# Patient Record
Sex: MALE | Race: WHITE | NOT HISPANIC OR LATINO | Employment: UNEMPLOYED | URBAN - METROPOLITAN AREA
[De-identification: names, ages, dates, MRNs, and addresses within clinical notes are randomized per-mention and may not be internally consistent; named-entity substitution may affect disease eponyms.]

---

## 2017-10-09 ENCOUNTER — OCCUPATIONAL MEDICINE (OUTPATIENT)
Dept: URGENT CARE | Facility: PHYSICIAN GROUP | Age: 47
End: 2017-10-09
Payer: COMMERCIAL

## 2017-10-09 VITALS
SYSTOLIC BLOOD PRESSURE: 120 MMHG | TEMPERATURE: 99 F | HEART RATE: 83 BPM | HEIGHT: 70 IN | OXYGEN SATURATION: 92 % | DIASTOLIC BLOOD PRESSURE: 80 MMHG | WEIGHT: 254 LBS | BODY MASS INDEX: 36.36 KG/M2 | RESPIRATION RATE: 16 BRPM

## 2017-10-09 DIAGNOSIS — M77.11 LATERAL EPICONDYLITIS, RIGHT ELBOW: ICD-10-CM

## 2017-10-09 PROCEDURE — 99204 OFFICE O/P NEW MOD 45 MIN: CPT | Performed by: NURSE PRACTITIONER

## 2017-10-09 ASSESSMENT — ENCOUNTER SYMPTOMS
WEAKNESS: 0
MYALGIAS: 1
TINGLING: 0
BRUISES/BLEEDS EASILY: 0
CHILLS: 0
FEVER: 0
SENSORY CHANGE: 0
FALLS: 0

## 2017-10-09 ASSESSMENT — PAIN SCALES - GENERAL: PAINLEVEL: 5=MODERATE PAIN

## 2017-10-09 NOTE — PROGRESS NOTES
"Subjective:      Rangel Malik is a 46 y.o. male who presents with Work-Related Injury (C/o RT elbow injury DOI 06/15/2017.  No noticable event to account for the pain.  PT states he is unsure if the pain is caused from work or from home.  Pain is exacerbated due to repeatitive motions at work. Unable to extend arm fully, with constant pain.)      DOI 6/15/17. 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 States no known injury. Right hand dominant. States outside portion of right elbow begins to hurt with repetitive motion. States wears elbow ace wrap while at work which helps. States has right hand weakness with lifting cans, able to lift one versus two at this time. No previous right elbow injury. No secondary job.     HPI  See above  PMH:  has a past medical history of Low back pain (9/26/2011).  MEDS:   Current Outpatient Prescriptions:   •  Acetaminophen (TYLENOL PO), Take  by mouth., Disp: , Rfl:   ALLERGIES: No Known Allergies  SURGHX:   Past Surgical History:   Procedure Laterality Date   • VASECTOMY       SOCHX:  reports that he has never smoked. He has never used smokeless tobacco. He reports that he drinks alcohol. He reports that he does not use drugs.  FH: Family history was reviewed, no pertinent findings to report    Review of Systems   Constitutional: Negative for chills, fever and malaise/fatigue.   Musculoskeletal: Positive for joint pain and myalgias. Negative for falls.   Neurological: Negative for tingling, sensory change and weakness.   Endo/Heme/Allergies: Does not bruise/bleed easily.          Objective:     /80   Pulse 83   Temp 37.2 °C (99 °F)   Resp 16   Ht 1.765 m (5' 9.5\")   Wt 115.2 kg (254 lb)   SpO2 92%   BMI 36.97 kg/m²      Physical Exam   Constitutional: He is oriented to person, place, and time. He appears well-developed and well-nourished. No distress.   HENT:   Head: Normocephalic.   Eyes: Conjunctivae and EOM are normal. Pupils are equal, " round, and reactive to light.   Neck: Normal range of motion. Neck supple.   Cardiovascular: Normal rate and regular rhythm.    Pulmonary/Chest: Effort normal and breath sounds normal.   Musculoskeletal: He exhibits tenderness. He exhibits no edema or deformity.        Right elbow: He exhibits normal range of motion, no swelling, no effusion and no deformity. Tenderness found. Lateral epicondyle tenderness noted.   Neurological: He is alert and oriented to person, place, and time.   Skin: Skin is warm and dry. He is not diaphoretic. No erythema.   Vitals reviewed.      A/O x 4. No redness, swelling or deformity seen at right elbow. TTP at lateral aspect of epicondyle. FROM of right elbow joint. Equal  of both hands. Pain at lateral epicondyle with flexion of right elbow. Skin p/w/d, skin sensation intact.       Assessment/Plan:     1. Lateral epicondylitis, right elbow    Mat use Ibuprofen as needed for pain  May use cool compresses for swelling prn  May utilize RICE method prn  Avoid weight lifting until muscle soreness in right arm decreases  May apply topical analgesics prn  Perform proper body mechanics with lifting, twisting, bending and reaching. Ask for assistance with heavy objects  Monitor for deformity, numbness/tingling in fingers, decreased ROM- need re-evaluation  Recheck on 10/13/17

## 2017-10-09 NOTE — LETTER
[facility]  [address] [place] [address]. 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    Medical Information   Related to Industrial Injury? No    Subjective Complaints:  DOI 6/15/17. 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 States no known injury. Right hand dominant. States outside portion of right elbow begins to hurt with repetitive motion. States wears elbow ace wrap while at work which helps. States has right hand weakness with lifting cans, able to lift one versus two at this time. No previous right elbow injury. No secondary job.   Objective Findings: A/O x 4. No redness, swelling or deformity seen at right elbow. TTP at lateral aspect of epicondyle. FROM of right elbow joint. Equal  of both hands. Pain at lateral epicondyle with flexion of right elbow. Skin p/w/d, skin sensation intact.   Pre-Existing Condition(s):     Assessment:   Initial Visit    Status: Additional Care Required  Permanent Disability:No    Plan:   Comments:Ibuprofen as needed for pain    Diagnostics:      Comments:       Disability Information   Status: Released to Restricted Duty    From:  10/9/2017  Through: 10/13/2017 Restrictions are: Temporary   Physical Restrictions   Sitting:    Standing:    Stooping:    Bending:      Squatting:    Walking:    Climbing:    Pushing:  < or = to 4 hrs/day  Comments:right arm   Pulling:  < or = to 4 hrs/day  Comments:right arm Other:    Reaching Above Shoulder (L):   Reaching Above  Shoulder (R):       Reaching Below Shoulder (L):    Reaching Below Shoulder (R):      Not to exceed Weight Limits   Carrying(hrs):   Weight Limit(lb): < or = to 10 pounds Lifting(hrs):   Weight  Limit(lb): < or = to 10 pounds   Comments: May use Ibuprofen up to 600 mg every 4-6 hrs as needed for pain, may apply cool compresses to left elbow for swelling as needed, may apply topical analgesic as needed for pain, may continue to use elbow ace wrap for support and stabilization while at work, apply RICE method as needed (Rest Ice Compression Elevation). Recheck on 10/13/17.    Repetitive Actions   Hands: i.e. Fine Manipulations from Grasping:     Feet: i.e. Operating Foot Controls:     Driving / Operate Machinery:     Physician Name: GEORGE Olivia Physician Signature:   e-Signature:  , Medical Director   Clinic Name / Location: [address]  [place] [address] Clinic Phone Number: Dept: [phone]   Appointment Time: 9:10 Am Visit Start Time: 9:23 AM   Check-In Time:  9:17 Am Visit Discharge Time:  10:06 AM   Original-Treating Physician or Chiropractor    Page 2-Insurer/TPA    Page 3-Employer    Page 4-Employee

## 2017-10-09 NOTE — LETTER
"EMPLOYEE’S CLAIM FOR COMPENSATION/ REPORT OF INITIAL TREATMENT  FORM C-4    EMPLOYEE’S CLAIM - PROVIDE ALL INFORMATION REQUESTED   First Name  Rangel Last Name  Helena Birthdate                    1970                Sex  male Claim Number   Home Address  [address] Age  46 y.o. 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 (if applicable)  Working    How did this injury or occupational disease occur? (Be specific an answer in detail. Use additional sheet if necessary)  Repetitive motion cutting open boxes   If you believe that you have an occupational disease, when did you first have knowledge of the disability and it relationship to your employment?  n/a Witnesses to the Accident        Nature of Injury or Occupational Disease  Strain  Part(s) of Body Injured or Affected  Elbow (R), N/A, N/A    I certify that the above is true and correct to the best of my knowledge and that I have provided this information in order to obtain the benefits of [place]  I hereby " authorize any physician, chiropractor, surgeon, practitioner, or other person, any hospital, including [facility], any medical service organization, any insurance company, or other institution or organization to release to each other, any medical or other information, including benefits paid or payable, pertinent to this injury or disease, except information relative to diagnosis, treatment and/or counseling for AIDS, psychological conditions, alcohol or controlled substances, for which I must give specific authorization.  A Photostat of this authorization shall be as valid as the original.     Date   Place   Employee’s Signature   THIS REPORT MUST BE COMPLETED AND MAILED WITHIN 3 WORKING DAYS OF TREATMENT   Place  [facility]  Name of Facility  [facility]   Date  10/9/2017 Diagnosis  (M77.11) Lateral epicondylitis, right elbow Is there evidence the injured employee was under the influence of alcohol and/or another controlled substance at the time of accident?   Hour  9:23 AM Description of Injury or Disease  The encounter diagnosis was Lateral epicondylitis, right elbow. No   Treatment  May use Ibuprofen up to 600 mg every 4-6 hrs as needed for pain, may apply cool compresses to left elbow for swelling as needed, may apply topical analgesic as needed for pain, may continue to use elbow ace wrap for support and stabilization while at work, apply RICE method as needed (Rest Ice Compression Elevation).   Have you advised the patient to remain off work five days or more? No   X-Ray Findings      If Yes   From Date  To Date      From information given by the employee, together with medical evidence, can you directly connect this injury or occupational disease as job incurred?  No If No Full Duty  No Modified Duty  Yes   Is additional medical care by a physician indicated?  Yes If Modified Duty, Specify any Limitations / Restrictions  No push/pull motion with right  "arm > 4 hrs/shift, no lifting/carrying > 10#   Do you know of any previous injury or disease contributing to this condition or occupational disease?                            No   Date  10/9/2017 Print Doctor’s Name GEORGE Olivia certify the employer’s copy of  this form was mailed on:   Address  [address] Insurer’s Use Only     [facility] [place] [facility]  [address]    Provider’s Tax ID Number  [id number] Telephone  Dept: [phone]            e-Signature:  , Medical Director Degree  APRN        ORIGINAL-TREATING PHYSICIAN OR CHIROPRACTOR    PAGE 2-INSURER/TPA    PAGE 3-EMPLOYER    PAGE 4-EMPLOYEE             Form C-4 (rev10/07)              BRIEF DESCRIPTION OF RIGHTS AND BENEFITS  (Pursuant to [place]RS 616C.050)    Notice of Injury or Occupational Disease (Incident Report Form C-1): If an injury or occupational disease (OD) arises out of and in the  course of employment, you must provide written notice to your employer as soon as practicable, but no later than 7 days after the accident or  OD. Your employer shall maintain a sufficient supply of the required forms.    Claim for Compensation (Form C-4): If medical treatment is sought, the form C-4 is available at the place of initial treatment. A completed  \"Claim for Compensation\" (Form C-4) must be filed within 90 days after an accident or OD. The treating physician or chiropractor must,  within 3 working days after treatment, complete and mail to the employer, the employer's insurer and third-party , the Claim for  Compensation.    Medical Treatment: If you require medical treatment for your on-the-job injury or OD, you may be required to select a physician or  chiropractor from a list provided by your workers’ compensation insurer, if it has contracted with an Organization for Managed Care (MCO) or  Preferred Provider Organization (PPO) or providers of health care. If your employer has not entered into a contract with an " MCO or PPO, you  may select a physician or chiropractor from the Panel of Physicians and Chiropractors. Any medical costs related to your industrial injury or  OD will be paid by your insurer.    Temporary Total Disability (TTD): If your doctor has certified that you are unable to work for a period of at least 5 consecutive days, or 5  cumulative days in a 20-day period, or places restrictions on you that your employer does not accommodate, you may be entitled to TTD  compensation.    Temporary Partial Disability (TPD): If the wage you receive upon reemployment is less than the compensation for TTD to which you are  entitled, the insurer may be required to pay you TPD compensation to make up the difference. TPD can only be paid for a maximum of 24  months.    Permanent Partial Disability (PPD): When your medical condition is stable and there is an indication of a PPD as a result of your injury or  OD, within 30 days, your insurer must arrange for an evaluation by a rating physician or chiropractor to determine the degree of your PPD. The  amount of your PPD award depends on the date of injury, the results of the PPD evaluation and your age and wage.    Permanent Total Disability (PTD): If you are medically certified by a treating physician or chiropractor as permanently and totally disabled  and have been granted a PTD status by your insurer, you are entitled to receive monthly benefits not to exceed 66 2/3% of your average  monthly wage. The amount of your PTD payments is subject to reduction if you previously received a PPD award.    Vocational Rehabilitation Services: You may be eligible for vocational rehabilitation services if you are unable to return to the job due to a  permanent physical impairment or permanent restrictions as a result of your injury or occupational disease.    Transportation and Per Eleazar Reimbursement: You may be eligible for travel expenses and per eleazar associated with medical  treatment.    Reopening: You may be able to reopen your claim if your condition worsens after claim closure.    Appeal Process: If you disagree with a written determination issued by the insurer or the insurer does not respond to your request, you may  appeal to the Department of Administration, , by following the instructions contained in your determination letter. You must  appeal the determination within 70 days from the date of the determination letter at [address], [address], [place], [place]  [address], or [address], [place], [place] [address]. If you disagree with the  decision, you may appeal to the  Department of Administration, . You must file your appeal within 30 days from the date of the  decision  letter at [address], [address], [place], [place] [address], or [address] [facility] [facility] [address], [place], [place] [address]. If you  disagree with a decision of an , you may file a petition for judicial review with the District Court. You must do so within 30  days of the Appeal Officer’s decision. You may be represented by an  at your own expense or you may contact the [employer] for possible  representation.    [place]  for Injured Workers (NAIW): If you disagree with a  decision, you may request that NAIW represent you  without charge at an  Hearing. For information regarding denial of benefits, you may contact the [employer] at: [address], [address], [place] [address], [phone], or [address], [address], [place] [address], [phone]    To File a Complaint with the Division: If you wish to file a complaint with the  of the Division of Industrial Relations (DIR),  please contact the Workers’ Compensation Section, [address], [address], [place], [place] [address], telephone [phone]) [phone], or  [address], [address], [place], [place] [address], telephone [phone].    For assistance with Workers’ Compensation Issues: you may contact the Office of the Governor Consumer Health Assistance, [address] [place] [address], [address], [place], [place] [address], Toll Free 1-940.350.1542, Web site: http://[email or web address].[email or web address]., E-mail  [email or web address].                                                                                                                                                                                                                                   __________________________________________________________________                                                                   _________________                Employee Name / Signature                                                                                                                                                       Date                                                                                                                                                                                                     D-2 (rev. 10/07)

## 2018-01-04 LAB
APTT PPP: 25.7 SEC (ref 24.7–36)
BASOPHILS # BLD AUTO: 0.3 % (ref 0–1.8)
BASOPHILS # BLD: 0.02 K/UL (ref 0–0.12)
BNP SERPL-MCNC: 19 PG/ML (ref 0–100)
EOSINOPHIL # BLD AUTO: 0.16 K/UL (ref 0–0.51)
EOSINOPHIL NFR BLD: 2.4 % (ref 0–6.9)
ERYTHROCYTE [DISTWIDTH] IN BLOOD BY AUTOMATED COUNT: 41.3 FL (ref 35.9–50)
HCT VFR BLD AUTO: 50.3 % (ref 42–52)
HGB BLD-MCNC: 16.7 G/DL (ref 14–18)
IMM GRANULOCYTES # BLD AUTO: 0.01 K/UL (ref 0–0.11)
IMM GRANULOCYTES NFR BLD AUTO: 0.1 % (ref 0–0.9)
INR PPP: 0.96 (ref 0.87–1.13)
LYMPHOCYTES # BLD AUTO: 1.47 K/UL (ref 1–4.8)
LYMPHOCYTES NFR BLD: 21.8 % (ref 22–41)
MCH RBC QN AUTO: 29 PG (ref 27–33)
MCHC RBC AUTO-ENTMCNC: 33.2 G/DL (ref 33.7–35.3)
MCV RBC AUTO: 87.3 FL (ref 81.4–97.8)
MONOCYTES # BLD AUTO: 0.64 K/UL (ref 0–0.85)
MONOCYTES NFR BLD AUTO: 9.5 % (ref 0–13.4)
NEUTROPHILS # BLD AUTO: 4.43 K/UL (ref 1.82–7.42)
NEUTROPHILS NFR BLD: 65.9 % (ref 44–72)
NRBC # BLD AUTO: 0 K/UL
NRBC BLD-RTO: 0 /100 WBC
PLATELET # BLD AUTO: 168 K/UL (ref 164–446)
PMV BLD AUTO: 10.7 FL (ref 9–12.9)
PROTHROMBIN TIME: 12.5 SEC (ref 12–14.6)
RBC # BLD AUTO: 5.76 M/UL (ref 4.7–6.1)
TROPONIN I SERPL-MCNC: <0.01 NG/ML (ref 0–0.04)
WBC # BLD AUTO: 6.7 K/UL (ref 4.8–10.8)

## 2018-01-04 PROCEDURE — 93005 ELECTROCARDIOGRAM TRACING: CPT | Performed by: EMERGENCY MEDICINE

## 2018-01-04 PROCEDURE — 83880 ASSAY OF NATRIURETIC PEPTIDE: CPT

## 2018-01-04 PROCEDURE — 36415 COLL VENOUS BLD VENIPUNCTURE: CPT

## 2018-01-04 PROCEDURE — 99285 EMERGENCY DEPT VISIT HI MDM: CPT

## 2018-01-04 PROCEDURE — 80053 COMPREHEN METABOLIC PANEL: CPT

## 2018-01-04 PROCEDURE — 85610 PROTHROMBIN TIME: CPT

## 2018-01-04 PROCEDURE — 85730 THROMBOPLASTIN TIME PARTIAL: CPT

## 2018-01-04 PROCEDURE — 85025 COMPLETE CBC W/AUTO DIFF WBC: CPT

## 2018-01-04 PROCEDURE — 83690 ASSAY OF LIPASE: CPT

## 2018-01-04 PROCEDURE — 93005 ELECTROCARDIOGRAM TRACING: CPT

## 2018-01-04 PROCEDURE — 84484 ASSAY OF TROPONIN QUANT: CPT

## 2018-01-04 ASSESSMENT — PAIN SCALES - GENERAL: PAINLEVEL_OUTOF10: 2

## 2018-01-05 ENCOUNTER — PATIENT OUTREACH (OUTPATIENT)
Dept: HEALTH INFORMATION MANAGEMENT | Facility: OTHER | Age: 48
End: 2018-01-05

## 2018-01-05 ENCOUNTER — APPOINTMENT (OUTPATIENT)
Dept: RADIOLOGY | Facility: MEDICAL CENTER | Age: 48
End: 2018-01-05
Attending: EMERGENCY MEDICINE
Payer: COMMERCIAL

## 2018-01-05 ENCOUNTER — HOSPITAL ENCOUNTER (OUTPATIENT)
Facility: MEDICAL CENTER | Age: 48
End: 2018-01-05
Attending: EMERGENCY MEDICINE | Admitting: HOSPITALIST
Payer: COMMERCIAL

## 2018-01-05 ENCOUNTER — APPOINTMENT (OUTPATIENT)
Dept: RADIOLOGY | Facility: MEDICAL CENTER | Age: 48
End: 2018-01-05
Attending: HOSPITALIST
Payer: COMMERCIAL

## 2018-01-05 ENCOUNTER — RESOLUTE PROFESSIONAL BILLING HOSPITAL PROF FEE (OUTPATIENT)
Dept: HOSPITALIST | Facility: MEDICAL CENTER | Age: 48
End: 2018-01-05
Payer: COMMERCIAL

## 2018-01-05 VITALS
WEIGHT: 256.39 LBS | HEART RATE: 81 BPM | SYSTOLIC BLOOD PRESSURE: 112 MMHG | OXYGEN SATURATION: 96 % | BODY MASS INDEX: 32.91 KG/M2 | DIASTOLIC BLOOD PRESSURE: 82 MMHG | HEIGHT: 74 IN | RESPIRATION RATE: 18 BRPM | TEMPERATURE: 98.3 F

## 2018-01-05 PROBLEM — R07.9 CHEST PAIN: Status: ACTIVE | Noted: 2018-01-05

## 2018-01-05 LAB
ALBUMIN SERPL BCP-MCNC: 4 G/DL (ref 3.2–4.9)
ALBUMIN/GLOB SERPL: 1.3 G/DL
ALP SERPL-CCNC: 49 U/L (ref 30–99)
ALT SERPL-CCNC: 20 U/L (ref 2–50)
ANION GAP SERPL CALC-SCNC: 13 MMOL/L (ref 0–11.9)
AST SERPL-CCNC: 18 U/L (ref 12–45)
BILIRUB SERPL-MCNC: 0.3 MG/DL (ref 0.1–1.5)
BUN SERPL-MCNC: 9 MG/DL (ref 8–22)
CALCIUM SERPL-MCNC: 9.3 MG/DL (ref 8.5–10.5)
CHLORIDE SERPL-SCNC: 107 MMOL/L (ref 96–112)
CHOLEST SERPL-MCNC: 179 MG/DL (ref 100–199)
CO2 SERPL-SCNC: 19 MMOL/L (ref 20–33)
CREAT SERPL-MCNC: 1.01 MG/DL (ref 0.5–1.4)
DEPRECATED D DIMER PPP IA-ACNC: <200 NG/ML(D-DU)
EKG IMPRESSION: NORMAL
EKG IMPRESSION: NORMAL
GFR SERPL CREATININE-BSD FRML MDRD: >60 ML/MIN/1.73 M 2
GLOBULIN SER CALC-MCNC: 3.2 G/DL (ref 1.9–3.5)
GLUCOSE SERPL-MCNC: 109 MG/DL (ref 65–99)
HDLC SERPL-MCNC: 39 MG/DL
LDLC SERPL CALC-MCNC: 110 MG/DL
LIPASE SERPL-CCNC: 29 U/L (ref 11–82)
POTASSIUM SERPL-SCNC: 4 MMOL/L (ref 3.6–5.5)
PROT SERPL-MCNC: 7.2 G/DL (ref 6–8.2)
SODIUM SERPL-SCNC: 139 MMOL/L (ref 135–145)
TRIGL SERPL-MCNC: 152 MG/DL (ref 0–149)
TROPONIN I SERPL-MCNC: <0.01 NG/ML (ref 0–0.04)

## 2018-01-05 PROCEDURE — G0378 HOSPITAL OBSERVATION PER HR: HCPCS

## 2018-01-05 PROCEDURE — 71045 X-RAY EXAM CHEST 1 VIEW: CPT

## 2018-01-05 PROCEDURE — A9270 NON-COVERED ITEM OR SERVICE: HCPCS | Performed by: HOSPITALIST

## 2018-01-05 PROCEDURE — 99219 PR INITIAL OBSERVATION CARE,LEVL II: CPT | Performed by: HOSPITALIST

## 2018-01-05 PROCEDURE — 85379 FIBRIN DEGRADATION QUANT: CPT

## 2018-01-05 PROCEDURE — G8979 MOBILITY GOAL STATUS: HCPCS | Mod: CH

## 2018-01-05 PROCEDURE — A9502 TC99M TETROFOSMIN: HCPCS

## 2018-01-05 PROCEDURE — G8978 MOBILITY CURRENT STATUS: HCPCS | Mod: CH

## 2018-01-05 PROCEDURE — 36415 COLL VENOUS BLD VENIPUNCTURE: CPT

## 2018-01-05 PROCEDURE — A9270 NON-COVERED ITEM OR SERVICE: HCPCS | Performed by: EMERGENCY MEDICINE

## 2018-01-05 PROCEDURE — 84484 ASSAY OF TROPONIN QUANT: CPT

## 2018-01-05 PROCEDURE — 93922 UPR/L XTREMITY ART 2 LEVELS: CPT | Mod: 26 | Performed by: SURGERY

## 2018-01-05 PROCEDURE — 97161 PT EVAL LOW COMPLEX 20 MIN: CPT

## 2018-01-05 PROCEDURE — 700102 HCHG RX REV CODE 250 W/ 637 OVERRIDE(OP): Performed by: EMERGENCY MEDICINE

## 2018-01-05 PROCEDURE — 93922 UPR/L XTREMITY ART 2 LEVELS: CPT

## 2018-01-05 PROCEDURE — G8980 MOBILITY D/C STATUS: HCPCS | Mod: CH

## 2018-01-05 PROCEDURE — 700102 HCHG RX REV CODE 250 W/ 637 OVERRIDE(OP): Performed by: HOSPITALIST

## 2018-01-05 PROCEDURE — 93005 ELECTROCARDIOGRAM TRACING: CPT | Performed by: HOSPITALIST

## 2018-01-05 PROCEDURE — 93010 ELECTROCARDIOGRAM REPORT: CPT | Performed by: INTERNAL MEDICINE

## 2018-01-05 PROCEDURE — 80061 LIPID PANEL: CPT

## 2018-01-05 RX ORDER — MORPHINE SULFATE 4 MG/ML
2-4 INJECTION, SOLUTION INTRAMUSCULAR; INTRAVENOUS
Status: DISCONTINUED | OUTPATIENT
Start: 2018-01-05 | End: 2018-01-05 | Stop reason: HOSPADM

## 2018-01-05 RX ORDER — ONDANSETRON 2 MG/ML
4 INJECTION INTRAMUSCULAR; INTRAVENOUS EVERY 4 HOURS PRN
Status: DISCONTINUED | OUTPATIENT
Start: 2018-01-05 | End: 2018-01-05 | Stop reason: HOSPADM

## 2018-01-05 RX ORDER — ACETAMINOPHEN 325 MG/1
650 TABLET ORAL EVERY 6 HOURS PRN
Status: ON HOLD | COMMUNITY
End: 2021-02-15

## 2018-01-05 RX ORDER — ATORVASTATIN CALCIUM 40 MG/1
40 TABLET, FILM COATED ORAL EVERY EVENING
Status: DISCONTINUED | OUTPATIENT
Start: 2018-01-05 | End: 2018-01-05 | Stop reason: HOSPADM

## 2018-01-05 RX ORDER — NITROGLYCERIN 0.4 MG/1
0.4 TABLET SUBLINGUAL
Status: DISCONTINUED | OUTPATIENT
Start: 2018-01-05 | End: 2018-01-05 | Stop reason: HOSPADM

## 2018-01-05 RX ORDER — ASPIRIN 81 MG/1
324 TABLET, CHEWABLE ORAL DAILY
Status: DISCONTINUED | OUTPATIENT
Start: 2018-01-05 | End: 2018-01-05 | Stop reason: HOSPADM

## 2018-01-05 RX ORDER — AMOXICILLIN 250 MG
2 CAPSULE ORAL 2 TIMES DAILY
Status: DISCONTINUED | OUTPATIENT
Start: 2018-01-05 | End: 2018-01-05 | Stop reason: HOSPADM

## 2018-01-05 RX ORDER — PROMETHAZINE HYDROCHLORIDE 25 MG/1
12.5-25 SUPPOSITORY RECTAL EVERY 4 HOURS PRN
Status: DISCONTINUED | OUTPATIENT
Start: 2018-01-05 | End: 2018-01-05 | Stop reason: HOSPADM

## 2018-01-05 RX ORDER — ONDANSETRON 4 MG/1
4 TABLET, ORALLY DISINTEGRATING ORAL EVERY 4 HOURS PRN
Status: DISCONTINUED | OUTPATIENT
Start: 2018-01-05 | End: 2018-01-05 | Stop reason: HOSPADM

## 2018-01-05 RX ORDER — ACETAMINOPHEN 325 MG/1
650 TABLET ORAL EVERY 6 HOURS PRN
Status: DISCONTINUED | OUTPATIENT
Start: 2018-01-05 | End: 2018-01-05 | Stop reason: HOSPADM

## 2018-01-05 RX ORDER — ASPIRIN 325 MG
325 TABLET ORAL DAILY
Status: DISCONTINUED | OUTPATIENT
Start: 2018-01-05 | End: 2018-01-05 | Stop reason: HOSPADM

## 2018-01-05 RX ORDER — PROMETHAZINE HYDROCHLORIDE 25 MG/1
12.5-25 TABLET ORAL EVERY 4 HOURS PRN
Status: DISCONTINUED | OUTPATIENT
Start: 2018-01-05 | End: 2018-01-05 | Stop reason: HOSPADM

## 2018-01-05 RX ORDER — ASPIRIN 81 MG/1
324 TABLET, CHEWABLE ORAL ONCE
Status: COMPLETED | OUTPATIENT
Start: 2018-01-05 | End: 2018-01-05

## 2018-01-05 RX ORDER — NITROGLYCERIN 0.4 MG/1
0.4 TABLET SUBLINGUAL ONCE
Status: COMPLETED | OUTPATIENT
Start: 2018-01-05 | End: 2018-01-05

## 2018-01-05 RX ORDER — POLYETHYLENE GLYCOL 3350 17 G/17G
1 POWDER, FOR SOLUTION ORAL
Status: DISCONTINUED | OUTPATIENT
Start: 2018-01-05 | End: 2018-01-05 | Stop reason: HOSPADM

## 2018-01-05 RX ORDER — ASPIRIN 300 MG/1
300 SUPPOSITORY RECTAL DAILY
Status: DISCONTINUED | OUTPATIENT
Start: 2018-01-05 | End: 2018-01-05 | Stop reason: HOSPADM

## 2018-01-05 RX ORDER — BISACODYL 10 MG
10 SUPPOSITORY, RECTAL RECTAL
Status: DISCONTINUED | OUTPATIENT
Start: 2018-01-05 | End: 2018-01-05 | Stop reason: HOSPADM

## 2018-01-05 RX ADMIN — ACETAMINOPHEN 650 MG: 325 TABLET, FILM COATED ORAL at 12:15

## 2018-01-05 RX ADMIN — ASPIRIN 324 MG: 81 TABLET, CHEWABLE ORAL at 03:20

## 2018-01-05 RX ADMIN — NITROGLYCERIN 0.4 MG: 0.4 TABLET SUBLINGUAL at 03:19

## 2018-01-05 RX ADMIN — ASPIRIN 325 MG: 325 TABLET, COATED ORAL at 07:53

## 2018-01-05 ASSESSMENT — LIFESTYLE VARIABLES
TOTAL SCORE: 0
HAVE PEOPLE ANNOYED YOU BY CRITICIZING YOUR DRINKING: NO
TOTAL SCORE: 0
EVER_SMOKED: NEVER
HAVE YOU EVER FELT YOU SHOULD CUT DOWN ON YOUR DRINKING: NO
ALCOHOL_USE: YES
EVER HAD A DRINK FIRST THING IN THE MORNING TO STEADY YOUR NERVES TO GET RID OF A HANGOVER: NO
CONSUMPTION TOTAL: INCOMPLETE
SUBSTANCE_ABUSE: 0
TOTAL SCORE: 0
EVER FELT BAD OR GUILTY ABOUT YOUR DRINKING: NO

## 2018-01-05 ASSESSMENT — ENCOUNTER SYMPTOMS
HEADACHES: 0
PALPITATIONS: 0
BACK PAIN: 0
EYE DISCHARGE: 0
MYALGIAS: 0
VOMITING: 0
FEVER: 0
STRIDOR: 0
DIAPHORESIS: 1
SHORTNESS OF BREATH: 1
DIZZINESS: 1
NERVOUS/ANXIOUS: 0
DIARRHEA: 0
CHILLS: 0
ABDOMINAL PAIN: 0
DEPRESSION: 0
NAUSEA: 0
BLOOD IN STOOL: 0
COUGH: 0
HEARTBURN: 0

## 2018-01-05 ASSESSMENT — COGNITIVE AND FUNCTIONAL STATUS - GENERAL
MOBILITY SCORE: 24
SUGGESTED CMS G CODE MODIFIER MOBILITY: CH
SUGGESTED CMS G CODE MODIFIER MOBILITY: CH
SUGGESTED CMS G CODE MODIFIER DAILY ACTIVITY: CH
MOBILITY SCORE: 24
DAILY ACTIVITIY SCORE: 24

## 2018-01-05 ASSESSMENT — PAIN SCALES - GENERAL
PAINLEVEL_OUTOF10: 0
PAINLEVEL_OUTOF10: 3

## 2018-01-05 ASSESSMENT — PATIENT HEALTH QUESTIONNAIRE - PHQ9
SUM OF ALL RESPONSES TO PHQ QUESTIONS 1-9: 0
2. FEELING DOWN, DEPRESSED, IRRITABLE, OR HOPELESS: NOT AT ALL
SUM OF ALL RESPONSES TO PHQ9 QUESTIONS 1 AND 2: 0
1. LITTLE INTEREST OR PLEASURE IN DOING THINGS: NOT AT ALL

## 2018-01-05 ASSESSMENT — GAIT ASSESSMENTS
GAIT LEVEL OF ASSIST: SUPERVISED
DISTANCE (FEET): 1000

## 2018-01-05 NOTE — PROGRESS NOTES
2 RN skin check with Zaria GALEAS    Pt has intact skin to ears, elbows and legs. Pt has mild redness to heels/ blanching. Pt has redness to sacrum, blanching. No open areas appreciated.

## 2018-01-05 NOTE — PROGRESS NOTES
Patient given discharge instructions. No prescriptions required. All questions answered. Patient IV and tele box removed. Patient belongings with patient. Patient discharged to home walking with family member.

## 2018-01-05 NOTE — ED PROVIDER NOTES
"ED Provider Note    CHIEF COMPLAINT  Chief Complaint   Patient presents with   • Chest Pain     pt reports started this afternoon, pt states, \"it got worse at work while I was lifting stuff.\" pain decreased when pt stopped working but pain still present. pain is on left side of chest. \"like someone is sitting on it.\"  denies N/V   • Shortness of Breath   • Lightheadedness       HPI  Rangel Malik is a 47 y.o. male who presents With chest pain. Patient reports the chest pain has been present since around noon today. 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 He describes associated diaphoresis during that time but no associated nausea. He reports mild shortness of breath and mild decreased exertional capacity reporting that he feels he can't walk as far as he like without getting short of breath. This is new. Patient denies any orthopnea or PND. He denies any fever or constitutional symptoms. Patient denies any lower extremity edema. He denies any history of blood clots, any recent history of cancer, any recent surgeries, any recent long travel, any recent long bone fractures, and hemoptysis. He is a nonsmoker.    REVIEW OF SYSTEMS  Review of Systems   Constitutional: Positive for diaphoresis. Negative for chills and fever.   HENT: Negative for hearing loss and tinnitus.    Respiratory: Positive for shortness of breath.    Cardiovascular: Positive for chest pain.   Gastrointestinal: Negative for abdominal pain, diarrhea and vomiting.   Genitourinary: Negative for dysuria and urgency.   Musculoskeletal: Negative for myalgias.   Skin: Negative for rash.   Psychiatric/Behavioral: Negative for depression.   All other systems reviewed and are negative.      See HPI for further details. All other systems are negative.     PAST MEDICAL HISTORY   has a past medical history of Low back pain (9/26/2011).    SOCIAL HISTORY  Social History     Social History Main Topics   • Smoking status: Never Smoker   • Smokeless " tobacco: Never Used   • Alcohol use Yes      Comment: occasional;   • Drug use: No   • Sexual activity: Yes       SURGICAL HISTORY   has a past surgical history that includes vasectomy.    CURRENT MEDICATIONS  Home Medications    **Home medications have not yet been reviewed for this encounter**         ALLERGIES  No Known Allergies    PHYSICAL EXAM  Physical Exam   Constitutional: He is oriented to person, place, and time. He appears well-developed and well-nourished.   HENT:   Head: Normocephalic and atraumatic.   Eyes: Conjunctivae are normal. Pupils are equal, round, and reactive to light.   Neck: Normal range of motion. Neck supple.   Cardiovascular: Normal rate and regular rhythm.    Pulmonary/Chest: Effort normal and breath sounds normal. No respiratory distress. He has no wheezes. He has no rales.   Abdominal: Soft. Bowel sounds are normal. He exhibits no distension. There is no tenderness. There is no rebound and no guarding.   Musculoskeletal: Normal range of motion.   Neurological: He is alert and oriented to person, place, and time.   Skin: Skin is warm and dry.     Results for orders placed or performed during the hospital encounter of 01/05/18   Troponin   Result Value Ref Range    Troponin I <0.01 0.00 - 0.04 ng/mL   Btype Natriuretic Peptide   Result Value Ref Range    B Natriuretic Peptide 19 0 - 100 pg/mL   CBC with Differential   Result Value Ref Range    WBC 6.7 4.8 - 10.8 K/uL    RBC 5.76 4.70 - 6.10 M/uL    Hemoglobin 16.7 14.0 - 18.0 g/dL    Hematocrit 50.3 42.0 - 52.0 %    MCV 87.3 81.4 - 97.8 fL    MCH 29.0 27.0 - 33.0 pg    MCHC 33.2 (L) 33.7 - 35.3 g/dL    RDW 41.3 35.9 - 50.0 fL    Platelet Count 168 164 - 446 K/uL    MPV 10.7 9.0 - 12.9 fL    Neutrophils-Polys 65.90 44.00 - 72.00 %    Lymphocytes 21.80 (L) 22.00 - 41.00 %    Monocytes 9.50 0.00 - 13.40 %    Eosinophils 2.40 0.00 - 6.90 %    Basophils 0.30 0.00 - 1.80 %    Immature Granulocytes 0.10 0.00 - 0.90 %    Nucleated RBC 0.00  /100 WBC    Neutrophils (Absolute) 4.43 1.82 - 7.42 K/uL    Lymphs (Absolute) 1.47 1.00 - 4.80 K/uL    Monos (Absolute) 0.64 0.00 - 0.85 K/uL    Eos (Absolute) 0.16 0.00 - 0.51 K/uL    Baso (Absolute) 0.02 0.00 - 0.12 K/uL    Immature Granulocytes (abs) 0.01 0.00 - 0.11 K/uL    NRBC (Absolute) 0.00 K/uL   Complete Metabolic Panel (CMP)   Result Value Ref Range    Sodium 139 135 - 145 mmol/L    Potassium 4.0 3.6 - 5.5 mmol/L    Chloride 107 96 - 112 mmol/L    Co2 19 (L) 20 - 33 mmol/L    Anion Gap 13.0 (H) 0.0 - 11.9    Glucose 109 (H) 65 - 99 mg/dL    Bun 9 8 - 22 mg/dL    Creatinine 1.01 0.50 - 1.40 mg/dL    Calcium 9.3 8.5 - 10.5 mg/dL    AST(SGOT) 18 12 - 45 U/L    ALT(SGPT) 20 2 - 50 U/L    Alkaline Phosphatase 49 30 - 99 U/L    Total Bilirubin 0.3 0.1 - 1.5 mg/dL    Albumin 4.0 3.2 - 4.9 g/dL    Total Protein 7.2 6.0 - 8.2 g/dL    Globulin 3.2 1.9 - 3.5 g/dL    A-G Ratio 1.3 g/dL   Prothrombin Time   Result Value Ref Range    PT 12.5 12.0 - 14.6 sec    INR 0.96 0.87 - 1.13   APTT   Result Value Ref Range    APTT 25.7 24.7 - 36.0 sec   Lipase   Result Value Ref Range    Lipase 29 11 - 82 U/L   ESTIMATED GFR   Result Value Ref Range    GFR If African American >60 >60 mL/min/1.73 m 2    GFR If Non African American >60 >60 mL/min/1.73 m 2   Troponin in four (4) hours   Result Value Ref Range    Troponin I <0.01 0.00 - 0.04 ng/mL   Lipid Profile (Tomorrow AM)   Result Value Ref Range    Cholesterol,Tot 179 100 - 199 mg/dL    Triglycerides 152 (H) 0 - 149 mg/dL    HDL 39 (A) >=40 mg/dL     (H) <100 mg/dL   EKG (NOW)   Result Value Ref Range    Report       [facility] Emergency Dept.    Test Date:  2018  Pt Name:    NISHANT COSME               Department: ER  MRN:        [id number]                      Room:  Gender:     Male                         Technician: [id number]  :        1970                   Requested By:ER TRIAGE PROTOCOL  Order #:    [id number]                     Reading MD: Azael Rm MD    Measurements  Intervals                                Axis  Rate:       79                           P:          32  NM:         164                          QRS:        -29  QRSD:       98                           T:          27  QT:         360  QTc:        413    Interpretive Statements  EKG is normal sinus rhythm normal axis intervals, mild poor R wave  progression in  precordial leads  Electronically Signed On 1-5-2018 3:12:30 PST by Azael Rm MD           COURSE & MEDICAL DECISION MAKING  Pertinent Labs & Imaging studies reviewed. (See chart for details)  Patient here with chest pain with concerning findings including diaphoresis and associated shortness of breath. His EKG is unremarkable aside from mild poor R wave progression in precordial leads. His troponin is unremarkable and his chest x-ray is also unremarkable. I discussed with patient that his heart score is less than 3 and he could undergo evaluation as an outpatient however patient is requesting admission as he would like an expedited evaluation. I believe this is warranted as patient's history is concerning. I discussed case with hospitalist who will admit patient to telemetry and further risk stratify him a cardio vascular inspected. Patient with out any tearing pain or pain radiating into his back, patient is PERC negative.  Patient will be admitted to the telemetry unit, he has been given aspirin.    FINAL IMPRESSION  1. Acute chest pain         Electronically signed by: Jag Addison, 1/5/2018 3:14 AM

## 2018-01-05 NOTE — DISCHARGE INSTRUCTIONS
Discharge Instructions    Discharged to home by car with relative. Discharged via wheelchair, hospital escort: Yes.  Special equipment needed: Not Applicable    Be sure to schedule a follow-up appointment with your primary care doctor or any specialists as instructed.     Discharge Plan:   Influenza Vaccine Indication: Patient Refuses    I understand that a diet low in cholesterol, fat, and sodium is recommended for good health. Unless I have been given specific instructions below for another diet, I accept this instruction as my diet prescription.   Other diet: regular    Special Instructions: None    · Is patient discharged on Warfarin / Coumadin?   No     · Is patient Post Blood Transfusion?  No    Depression / Suicide Risk    As you are discharged from this [facility] facility, it is important to learn how to keep safe from harming yourself.    Recognize the warning signs:  · Abrupt changes in personality, positive or negative- including increase in energy   · Giving away possessions  · Change in eating patterns- significant weight changes-  positive or negative  · Change in sleeping patterns- unable to sleep or sleeping all the time   · Unwillingness or inability to communicate  · Depression  · Unusual sadness, discouragement and loneliness  · Talk of wanting to die  · Neglect of personal appearance   · Rebelliousness- reckless behavior  · Withdrawal from people/activities they love  · Confusion- inability to concentrate     If you or a loved one observes any of these behaviors or has concerns about self-harm, here's what you can do:  · Talk about it- your feelings and reasons for harming yourself  · Remove any means that you might use to hurt yourself (examples: pills, rope, extension cords, firearm)  · Get professional help from the community (Mental Health, Substance Abuse, psychological counseling)  · Do not be alone:Call your Safe Contact- someone whom you trust who will be there for you.  · Call your  local CRISIS HOTLINE 997-0315 or 765-608-0402  · Call your local Children's Mobile Crisis Response Team [place] (828) 503-8268 or www.Rankomat.pl  · Call the toll free National Suicide Prevention Hotlines   · National Suicide Prevention Lifeline 375-616-DNPB (4758)  · National Hope Line Network 800-SUICIDE (655-4329)      Nonspecific Chest Pain   Chest pain can be caused by many different conditions. There is always a chance that your pain could be related to something serious, such as a heart attack or a blood clot in your lungs. Chest pain can also be caused by conditions that are not life-threatening. If you have chest pain, it is very important to follow up with your health care provider.  CAUSES   Chest pain can be caused by:  · Heartburn.  · Pneumonia or bronchitis.  · Anxiety or stress.  · Inflammation around your heart (pericarditis) or lung (pleuritis or pleurisy).  · A blood clot in your lung.  · A collapsed lung (pneumothorax). It can develop suddenly on its own (spontaneous pneumothorax) or from trauma to the chest.  · Shingles infection (varicella-zoster virus).  · Heart attack.  · Damage to the bones, muscles, and cartilage that make up your chest wall. This can include:  ¨ Bruised bones due to injury.  ¨ Strained muscles or cartilage due to frequent or repeated coughing or overwork.  ¨ Fracture to one or more ribs.  ¨ Sore cartilage due to inflammation (costochondritis).  RISK FACTORS   Risk factors for chest pain may include:  · Activities that increase your risk for trauma or injury to your chest.  · Respiratory infections or conditions that cause frequent coughing.  · Medical conditions or overeating that can cause heartburn.  · Heart disease or family history of heart disease.  · Conditions or health behaviors that increase your risk of developing a blood clot.  · Having had chicken pox (varicella zoster).  SIGNS AND SYMPTOMS  Chest pain can feel like:  · Burning or tingling on the  surface of your chest or deep in your chest.  · Crushing, pressure, aching, or squeezing pain.  · Dull or sharp pain that is worse when you move, cough, or take a deep breath.  · Pain that is also felt in your back, neck, shoulder, or arm, or pain that spreads to any of these areas.  Your chest pain may come and go, or it may stay constant.  DIAGNOSIS  Lab tests or other studies may be needed to find the cause of your pain. Your health care provider may have you take a test called an ambulatory ECG (electrocardiogram). An ECG records your heartbeat patterns at the time the test is performed. You may also have other tests, such as:  · Transthoracic echocardiogram (TTE). During echocardiography, sound waves are used to create a picture of all of the heart structures and to look at how blood flows through your heart.  · Transesophageal echocardiogram (LOBO). This is a more advanced imaging test that obtains images from inside your body. It allows your health care provider to see your heart in finer detail.  · Cardiac monitoring. This allows your health care provider to monitor your heart rate and rhythm in real time.  · Holter monitor. This is a portable device that records your heartbeat and can help to diagnose abnormal heartbeats. It allows your health care provider to track your heart activity for several days, if needed.  · Stress tests. These can be done through exercise or by taking medicine that makes your heart beat more quickly.  · Blood tests.  · Imaging tests.  TREATMENT   Your treatment depends on what is causing your chest pain. Treatment may include:  · Medicines. These may include:  ¨ Acid blockers for heartburn.  ¨ Anti-inflammatory medicine.  ¨ Pain medicine for inflammatory conditions.  ¨ Antibiotic medicine, if an infection is present.  ¨ Medicines to dissolve blood clots.  ¨ Medicines to treat coronary artery disease.  · Supportive care for conditions that do not require medicines. This may  include:  ¨ Resting.  ¨ Applying heat or cold packs to injured areas.  ¨ Limiting activities until pain decreases.  HOME CARE INSTRUCTIONS  · If you were prescribed an antibiotic medicine, finish it all even if you start to feel better.  · Avoid any activities that bring on chest pain.  · Do not use any tobacco products, including cigarettes, chewing tobacco, or electronic cigarettes. If you need help quitting, ask your health care provider.  · Do not drink alcohol.  · Take medicines only as directed by your health care provider.  · Keep all follow-up visits as directed by your health care provider. This is important. This includes any further testing if your chest pain does not go away.  · If heartburn is the cause for your chest pain, you may be told to keep your head raised (elevated) while sleeping. This reduces the chance that acid will go from your stomach into your esophagus.  · Make lifestyle changes as directed by your health care provider. These may include:  ¨ Getting regular exercise. Ask your health care provider to suggest some activities that are safe for you.  ¨ Eating a heart-healthy diet. A registered dietitian can help you to learn healthy eating options.  ¨ Maintaining a healthy weight.  ¨ Managing diabetes, if necessary.  ¨ Reducing stress.  SEEK MEDICAL CARE IF:  · Your chest pain does not go away after treatment.  · You have a rash with blisters on your chest.  · You have a fever.  SEEK IMMEDIATE MEDICAL CARE IF:   · Your chest pain is worse.  · You have an increasing cough, or you cough up blood.  · You have severe abdominal pain.  · You have severe weakness.  · You faint.  · You have chills.  · You have sudden, unexplained chest discomfort.  · You have sudden, unexplained discomfort in your arms, back, neck, or jaw.  · You have shortness of breath at any time.  · You suddenly start to sweat, or your skin gets clammy.  · You feel nauseous or you vomit.  · You suddenly feel light-headed or  dizzy.  · Your heart begins to beat quickly, or it feels like it is skipping beats.  These symptoms may represent a serious problem that is an emergency. Do not wait to see if the symptoms will go away. Get medical help right away. Call your local emergency services (911 in the [place] Do not drive yourself to the hospital.     This information is not intended to replace advice given to you by your health care provider. Make sure you discuss any questions you have with your health care provider.     Document Released: 09/27/2006 Document Revised: 01/08/2016 Document Reviewed: 07/24/2015  [email or web address] Interactive Patient Education ©2016 [employer]    Chest Pain Observation  It is often hard to give a specific diagnosis for the cause of chest pain. Among other possibilities your symptoms might be caused by inadequate oxygen delivery to your heart (angina). Angina that is not treated or evaluated can lead to a heart attack (myocardial infarction) or death.  Blood tests, electrocardiograms, and X-rays may have been done to help determine a possible cause of your chest pain. After evaluation and observation, your health care provider has determined that it is unlikely your pain was caused by an unstable condition that requires hospitalization. However, a full evaluation of your pain may need to be completed, with additional diagnostic testing as directed. It is very important to keep your follow-up appointments. Not keeping your follow-up appointments could result in permanent heart damage, disability, or death. If there is any problem keeping your follow-up appointments, you must call your health care provider.  HOME CARE INSTRUCTIONS   Due to the slight chance that your pain could be angina, it is important to follow your health care provider's treatment plan and also maintain a healthy lifestyle:  · Maintain or work toward achieving a healthy weight.  · Stay physically active and exercise regularly.  · Decrease your  salt intake.  · Eat a balanced, healthy diet. Talk to a dietitian to learn about heart-healthy foods.  · Increase your fiber intake by including whole grains, vegetables, fruits, and nuts in your diet.  · Avoid situations that cause stress, anger, or depression.  · Take medicines as advised by your health care provider. Report any side effects to your health care provider. Do not stop medicines or adjust the dosages on your own.  · Quit smoking. Do not use nicotine patches or gum until you check with your health care provider.  · Keep your blood pressure, blood sugar, and cholesterol levels within normal limits.  · Limit alcohol intake to no more than 1 drink per day for women who are not pregnant and 2 drinks per day for men.  · Do not abuse drugs.  SEEK IMMEDIATE MEDICAL CARE IF:  You have severe chest pain or pressure which may include symptoms such as:  · You feel pain or pressure in your arms, neck, jaw, or back.  · You have severe back or abdominal pain, feel sick to your stomach (nauseous), or throw up (vomit).  · You are sweating profusely.  · You are having a fast or irregular heartbeat.  · You feel short of breath while at rest.  · You notice increasing shortness of breath during rest, sleep, or with activity.  · You have chest pain that does not get better after rest or after taking your usual medicine.  · You wake from sleep with chest pain.  · You are unable to sleep because you cannot breathe.  · You develop a frequent cough or you are coughing up blood.  · You feel dizzy, faint, or experience extreme fatigue.  · You develop severe weakness, dizziness, fainting, or chills.  Any of these symptoms may represent a serious problem that is an emergency. Do not wait to see if the symptoms will go away. Call your local emergency services (911 in the [place] Do not drive yourself to the hospital.  MAKE SURE YOU:  · Understand these instructions.  · Will watch your condition.  · Will get help right away if you  are not doing well or get worse.     This information is not intended to replace advice given to you by your health care provider. Make sure you discuss any questions you have with your health care provider.     Document Released: 01/20/2012 Document Revised: 12/23/2014 Document Reviewed: 06/19/2014  [employer]vier Interactive Patient Education ©2016 [employer]

## 2018-01-05 NOTE — ED NOTES
"Pt medicated per MAR. Reports current chest pressure \"like someone is sitting on me\" is a 2/10. Pain has been intermittent.   "

## 2018-01-05 NOTE — H&P
" Hospital Medicine History and Physical    Date of Service  1/5/2018    Chief Complaint  Chief Complaint   Patient presents with   • Chest Pain     pt reports started this afternoon, pt states, \"it got worse at work while I was lifting stuff.\" pain decreased when pt stopped working but pain still present. pain is on left side of chest. \"like someone is sitting on it.\"  denies N/V   • Shortness of Breath   • Lightheadedness       History of Presenting Illness  47 y.o. male who presented 1/5/2018 with Substernal chest pain while lifting approximate 40 pound container while at work. Patient earlier in the day had had some mild chest discomfort but noted upon lifting this item at work he had increasing chest pain. He denies any reproducible sternal pain nor any pleuritic pain. He did feel short of breath during this episode without any nausea but did feel mildly diaphoretic. He had no further radiation of pain noted tingling in the jaw or shoulder or arm. He described it as a pressure across his chest. Patient states that after being given nitroglycerin he had significant improvement of his chest discomfort. States he has a history of heartburn but this is quite different. His father recently in the 70s had a heart attack and no other prior heart disease early on in the family. Patient is a nonsmoker      Primary Care Physician  Pcp Pt States None    Consultants  None    Code Status  Full code    Review of Systems  Review of Systems   Constitutional: Negative for chills and fever.   Eyes: Negative for discharge.   Respiratory: Positive for shortness of breath. Negative for cough and stridor.    Cardiovascular: Positive for chest pain. Negative for palpitations and leg swelling.   Gastrointestinal: Negative for abdominal pain, blood in stool, diarrhea, heartburn, melena, nausea and vomiting.   Genitourinary: Negative for dysuria.   Musculoskeletal: Negative for back pain and joint pain.   Neurological: Positive for " dizziness (prior to arrival). Negative for headaches.   Psychiatric/Behavioral: Negative for substance abuse. The patient is not nervous/anxious.         Past Medical History  Past Medical History:   Diagnosis Date   • Low back pain 2011       Surgical History  Past Surgical History:   Procedure Laterality Date   • VASECTOMY         Medications  No current facility-administered medications on file prior to encounter.      Current Outpatient Prescriptions on File Prior to Encounter   Medication Sig Dispense Refill   • Acetaminophen (TYLENOL PO) Take  by mouth.         Family History  Family History   Problem Relation Age of Onset   • Cancer Neg Hx    • Diabetes Neg Hx    • Heart Disease Neg Hx    • Hypertension Neg Hx    • Hyperlipidemia Neg Hx    • Stroke Neg Hx        Social History  Social History   Substance Use Topics   • Smoking status: Never Smoker   • Smokeless tobacco: Never Used   • Alcohol use Yes      Comment: occasional;       Allergies  No Known Allergies     Physical Exam  Laboratory   Hemodynamics  Temp (24hrs), Av.9 °C (98.4 °F), Min:36.6 °C (97.8 °F), Max:37.3 °C (99.1 °F)   Temperature: 36.8 °C (98.3 °F)  Pulse  Av.4  Min: 80  Max: 87 Heart Rate (Monitored): 88  Blood Pressure: 112/82, NIBP: 127/86      Respiratory      Respiration: 18, Pulse Oximetry: 96 %        RUL Breath Sounds: Clear, RML Breath Sounds: Clear, RLL Breath Sounds: Clear, MERY Breath Sounds: Clear, LLL Breath Sounds: Clear    Physical Exam   Constitutional: He appears well-nourished. No distress.   Obese   HENT:   Head: Normocephalic and atraumatic.   Nose: Nose normal.   Mouth/Throat: Oropharynx is clear and moist.   Eyes: Conjunctivae are normal. Right eye exhibits no discharge. Left eye exhibits no discharge. No scleral icterus.   Neck: No tracheal deviation present.   Cardiovascular: Normal rate, regular rhythm, normal heart sounds and intact distal pulses.    No murmur heard.  Pulses:       Radial pulses are 2+  on the right side, and 2+ on the left side.   Pulmonary/Chest: Effort normal and breath sounds normal. No stridor. No respiratory distress. He has no wheezes. He has no rales. He exhibits no tenderness.   Abdominal: Soft. Bowel sounds are normal. He exhibits no distension. There is no tenderness.   Musculoskeletal: He exhibits no edema.   Neurological: He is alert. No cranial nerve deficit.   Skin: Skin is warm. He is not diaphoretic.   Psychiatric: He has a normal mood and affect. His behavior is normal. Thought content normal.   Vitals reviewed.      Recent Labs      01/04/18   2314   WBC  6.7   RBC  5.76   HEMOGLOBIN  16.7   HEMATOCRIT  50.3   MCV  87.3   MCH  29.0   MCHC  33.2*   RDW  41.3   PLATELETCT  168   MPV  10.7     Recent Labs      01/04/18   2314   SODIUM  139   POTASSIUM  4.0   CHLORIDE  107   CO2  19*   GLUCOSE  109*   BUN  9   CREATININE  1.01   CALCIUM  9.3     Recent Labs      01/04/18   2314   ALTSGPT  20   ASTSGOT  18   ALKPHOSPHAT  49   TBILIRUBIN  0.3   LIPASE  29   GLUCOSE  109*     Recent Labs      01/04/18   2314   APTT  25.7   INR  0.96     Recent Labs      01/04/18   2314   BNPBTYPENAT  19     Recent Labs      01/05/18   0403   TRIGLYCERIDE  152*   HDL  39*   LDL  110*     Lab Results   Component Value Date    TROPONINI <0.01 01/05/2018     Urinalysis:  No results found for: SPECGRAVITY, GLUCOSEUR, KETONES, NITRITE, WBCURINE, RBCURINE, BACTERIA, EPITHELCELL     Imaging  Chest x-ray: Acute pulmonary findings    Assessment/Plan     I anticipate this patient is appropriate for observation status at this time.    * Chest pain   Assessment & Plan    Treadmill stress test  Aspirin, statin, hold beta blocker secondary to need of appropriate heart rate during stress test  Checking fasting lipids  EKG reviewed  Monitor telemetry  Serial troponins              VTE prophylaxis: Ambulatory .

## 2018-01-05 NOTE — PROGRESS NOTES
Emelia Johns Fall Risk Assessment:     Last Known Fall: No falls  Mobility: No limitations  Medications: No meds  Mental Status/LOC/Awareness: Awake, alert, and oriented to date, place, and person  Toileting Needs: No needs  Volume/Electrolyte Status: No problems  Communication/Sensory: Visual (Glasses)/hearing deficit  Behavior: Appropriate behavior  Emelia Johns Fall Risk Total: 3  Fall Risk Level: NO RISK    Universal Fall Precautions:  call light/belongings in reach, bed in low position and locked, wheelchairs and assistive devices out of sight, siderails up x 2, use non-slip footwear, adequate lighting, clutter free and spill free environment, educate on level of risk, educate to call for assistance    Fall Risk Level Interventions:          Patient Specific Interventions:     Medication: review medications with patient and family, assess for medications that can be discontinued or dosage decreased and limit combination of prn medications  Mental Status/LOC/Awareness: reinforce falls education, check on patient hourly and reinforce the use of call light  Toileting: not applicable  Volume/Electrolyte Status: monitor abnormal lab values  Communication/Sensory: update plan of care on whiteboard and ensure proper positioning when transferrng/ambulating  Behavioral: administer medication as ordered and instruct/reinforce fall program rationale  Mobility: ensure bed is locked and in lowest position

## 2018-01-05 NOTE — PROGRESS NOTES
Med rec updated and complete.  Allergies reviewed.  No antibiotic use in past 30 days.  No prescription medications.

## 2018-01-05 NOTE — PROGRESS NOTES
Assumed care of patient. Bedside report received from GUDELIA Jackson. Updated on POC, call light within reach and fall precautions in place. Bed locked and in lowest position. Patient instructed to call for assistance before getting out of bed. All questions answered, no other needs at this time. MAR, labs, orders reviewed.

## 2018-01-05 NOTE — THERAPY
"46 y/o male adm for CP .  Cardiac education giiven- CV exercise recommendations, signs and symptoms for  having medical attention, activity progression and pacing. Pt and wife verbally expressed understanding of instructions given. No further acute PT services required at this time.    Physical Therapy Evaluation completed.   Bed Mobility:  Supine to Sit: Independent  Transfers: Sit to Stand: Independent  Gait: Level Of Assist: Supervised with No Equipment Needed       Plan of Care: Patient with no further skilled PT needs in the acute care setting at this time  Discharge Recommendations: Equipment: No Equipment Needed. Post-acute therapy Currently anticipate no further skilled therapy needs once patient is discharged from the inpatient setting.    See \"Rehab Therapy-Acute\" Patient Summary Report for complete documentation.     "

## 2018-01-05 NOTE — ED NOTES
"Chief Complaint   Patient presents with   • Chest Pain     pt reports started this afternoon, pt states, \"it got worse at work while I was lifting stuff.\" pain decreased when pt stopped working but pain still present. pain is on left side of chest. \"like someone is sitting on it.\"  denies N/V   • Shortness of Breath   • Lightheadedness     Pt to EKG  with above complaint, VSS, pt educated on triage process, placed back in lobby, pt instructed to notify staff of any issues.     Blood pressure 143/95, pulse 87, temperature 37.3 °C (99.1 °F), temperature source Temporal, resp. rate 14, height 1.88 m (6' 2\"), weight 116.5 kg (256 lb 13.4 oz), SpO2 100 %.    "

## 2018-01-05 NOTE — CARE PLAN
Problem: Discharge Barriers/Planning  Goal: Patient's continuum of care needs will be met    Intervention: Explain discharge instructions and medication reconcilliation to patient and significant other/support system  Pt given discharge instructions and prescriptions. All questions answered, no additional concerns at this time.

## 2018-01-06 NOTE — DISCHARGE SUMMARY
"CHIEF COMPLAINT ON ADMISSION  Chief Complaint   Patient presents with   • Chest Pain     pt reports started this afternoon, pt states, \"it got worse at work while I was lifting stuff.\" pain decreased when pt stopped working but pain still present. pain is on left side of chest. \"like someone is sitting on it.\"  denies N/V   • Shortness of Breath   • Lightheadedness       CODE STATUS  Full Code    HPI & HOSPITAL COURSE  This is a 47 y.o. male With no significant past medical history here with left-sided chest pain with exertion. It started while driving his son to work, but worsened while he was doing heavy lifting. He had associated diaphoresis. When he arrived in the ER his EKG was normal and troponin was negative. He had no events on telemetry while he was monitored as his troponins were trended. Cardiac enzymes remained negative therefore he underwent an exercise stress test which showed no evidence of reversible ischemia. His vital signs remained stable. Lipid panel was unremarkable.  D-dimer was negative. Chest x-ray was clear.    His chest pain resolved shortly after he arrived in the hospital he had no recurrence.    The cause of his pain was thought likely musculoskeletal in nature, he was told to establish care for routine vital sign monitoring and screening tests as needed    Therefore, he is discharged in good and stable condition with close outpatient follow-up.    SPECIFIC OUTPATIENT FOLLOW-UP  Follow-up to establish care with PCP    DISCHARGE PROBLEM LIST  Principal Problem:    Chest pain POA: Unknown  Resolved Problems:    * No resolved hospital problems. *      FOLLOW UP  No future appointments.  [facility]  [place] [id number]  [phone]    Schedule an appointment as soon as possible for a visit  To establish at the [facility]       MEDICATIONS ON DISCHARGE   Rangel Malik   Home Medication Instructions RENE:[id number]    Printed on:01/05/18 7125   Medication Information              "         acetaminophen (TYLENOL) 325 MG Tab  Take 650 mg by mouth every 6 hours as needed for Mild Pain.                 DIET  Orders Placed This Encounter   Procedures   • Protocol 1312 Diet Order: Reg, No Decaf, No Caffeine- Cardiac Stress Test- Treadmill with Isotope     Standing Status:   Standing     Number of Occurrences:   1     Order Specific Question:   Diet:     Answer:   Regular [1]     Order Specific Question:   Miscellaneous modifications:     Answer:   No Decaf, No Caffeine(for test) [11]     Comments:   Protocol 1312 No caffeine for 12 hours prior to exam (decaf, coffee, cola, tea, chocolate)       ACTIVITY  As tolerated.  Weight bearing as tolerated      CONSULTATIONS  None    PROCEDURES  None    LABORATORY  Lab Results   Component Value Date/Time    SODIUM 139 01/04/2018 11:14 PM    POTASSIUM 4.0 01/04/2018 11:14 PM    CHLORIDE 107 01/04/2018 11:14 PM    CO2 19 (L) 01/04/2018 11:14 PM    GLUCOSE 109 (H) 01/04/2018 11:14 PM    BUN 9 01/04/2018 11:14 PM    CREATININE 1.01 01/04/2018 11:14 PM        Lab Results   Component Value Date/Time    WBC 6.7 01/04/2018 11:14 PM    HEMOGLOBIN 16.7 01/04/2018 11:14 PM    HEMATOCRIT 50.3 01/04/2018 11:14 PM    PLATELETCT 168 01/04/2018 11:14 PM        Total time of the discharge process exceeds 38 minutes

## 2018-09-05 ENCOUNTER — OFFICE VISIT (OUTPATIENT)
Dept: URGENT CARE | Facility: PHYSICIAN GROUP | Age: 48
End: 2018-09-05
Payer: COMMERCIAL

## 2018-09-05 ENCOUNTER — APPOINTMENT (OUTPATIENT)
Dept: RADIOLOGY | Facility: IMAGING CENTER | Age: 48
End: 2018-09-05
Attending: PHYSICIAN ASSISTANT
Payer: COMMERCIAL

## 2018-09-05 VITALS
TEMPERATURE: 98.1 F | SYSTOLIC BLOOD PRESSURE: 124 MMHG | BODY MASS INDEX: 37.62 KG/M2 | HEART RATE: 66 BPM | HEIGHT: 69 IN | OXYGEN SATURATION: 96 % | WEIGHT: 254 LBS | DIASTOLIC BLOOD PRESSURE: 82 MMHG

## 2018-09-05 DIAGNOSIS — M25.511 CHRONIC RIGHT SHOULDER PAIN: ICD-10-CM

## 2018-09-05 DIAGNOSIS — G89.29 CHRONIC RIGHT SHOULDER PAIN: ICD-10-CM

## 2018-09-05 DIAGNOSIS — E66.9 OBESITY (BMI 35.0-39.9 WITHOUT COMORBIDITY): ICD-10-CM

## 2018-09-05 DIAGNOSIS — I51.7 CARDIOMEGALY: ICD-10-CM

## 2018-09-05 DIAGNOSIS — R06.02 SHORTNESS OF BREATH: ICD-10-CM

## 2018-09-05 DIAGNOSIS — R06.01 ORTHOPNEA: ICD-10-CM

## 2018-09-05 PROCEDURE — 71046 X-RAY EXAM CHEST 2 VIEWS: CPT | Mod: TC | Performed by: PHYSICIAN ASSISTANT

## 2018-09-05 PROCEDURE — 73030 X-RAY EXAM OF SHOULDER: CPT | Mod: TC,RT | Performed by: PHYSICIAN ASSISTANT

## 2018-09-05 PROCEDURE — 99214 OFFICE O/P EST MOD 30 MIN: CPT | Performed by: PHYSICIAN ASSISTANT

## 2018-09-05 ASSESSMENT — ENCOUNTER SYMPTOMS
WHEEZING: 0
SPUTUM PRODUCTION: 0
HEMOPTYSIS: 0
PALPITATIONS: 0
SHORTNESS OF BREATH: 0
SORE THROAT: 0
FEVER: 0
COUGH: 1
CHILLS: 0

## 2018-09-05 NOTE — PATIENT INSTRUCTIONS
Heart Failure  Heart failure is a condition in which the heart has trouble pumping blood because it has become weak or stiff. This means that the heart does not pump blood efficiently for the body to work well. For some people with heart failure, fluid may back up into the lungs and there may be swelling (edema) in the lower legs. Heart failure is usually a long-term (chronic) condition. It is important for you to take good care of yourself and follow the treatment plan from your health care provider.  What are the causes?  This condition is caused by some health problems, including:  · High blood pressure (hypertension). Hypertension causes the heart muscle to work harder than normal. High blood pressure eventually causes the heart to become stiff and weak.  · Coronary artery disease (CAD). CAD is the buildup of cholesterol and fat (plaques) in the arteries of the heart.  · Heart attack (myocardial infarction). Injured tissue, which is caused by the heart attack, does not contract as well and the heart's ability to pump blood is weakened.  · Abnormal heart valves. When the heart valves do not open and close properly, the heart muscle must pump harder to keep the blood flowing.  · Heart muscle disease (cardiomyopathy or myocarditis). Heart muscle disease is damage to the heart muscle from a variety of causes, such as drug or alcohol abuse, infections, or unknown causes. These can increase the risk of heart failure.  · Lung disease. When the lungs do not work properly, the heart must work harder.  What increases the risk?  Risk of heart failure increases as a person ages. This condition is also more likely to develop in people who:  · Are overweight.  · Are male.  · Smoke or chew tobacco.  · Abuse alcohol or illegal drugs.  · Have taken medicines that can damage the heart, such as chemotherapy drugs.  · Have diabetes.  ¨ High blood sugar (glucose) is associated with high fat (lipid) levels in the blood.  ¨ Diabetes  can also damage tiny blood vessels that carry nutrients to the heart muscle.  · Have abnormal heart rhythms.  · Have thyroid problems.  · Have low blood counts (anemia).  What are the signs or symptoms?  Symptoms of this condition include:  · Shortness of breath with activity, such as when climbing stairs.  · Persistent cough.  · Swelling of the feet, ankles, legs, or abdomen.  · Unexplained weight gain.  · Difficulty breathing when lying flat (orthopnea).  · Waking from sleep because of the need to sit up and get more air.  · Rapid heartbeat.  · Fatigue and loss of energy.  · Feeling light-headed, dizzy, or close to fainting.  · Loss of appetite.  · Nausea.  · Increased urination during the night (nocturia).  · Confusion.  How is this diagnosed?  This condition is diagnosed based on:  · Medical history, symptoms, and a physical exam.  · Diagnostic tests, which may include:  ¨ Echocardiogram.  ¨ Electrocardiogram (ECG).  ¨ Chest X-ray.  ¨ Blood tests.  ¨ Exercise stress test.  ¨ Radionuclide scans.  ¨ Cardiac catheterization and angiogram.  How is this treated?  Treatment for this condition is aimed at managing the symptoms of heart failure. Medicines, behavioral changes, or other treatments may be necessary to treat heart failure.  Medicines   These may include:  · Angiotensin-converting enzyme (ACE) inhibitors. This type of medicine blocks the effects of a blood protein called angiotensin-converting enzyme. ACE inhibitors relax (dilate) the blood vessels and help to lower blood pressure.  · Angiotensin receptor blockers (ARBs). This type of medicine blocks the actions of a blood protein called angiotensin. ARBs dilate the blood vessels and help to lower blood pressure.  · Water pills (diuretics). Diuretics cause the kidneys to remove salt and water from the blood. The extra fluid is removed through urination, leaving a lower volume of blood that the heart has to pump.  · Beta blockers. These improve heart muscle  strength and they prevent the heart from beating too quickly.  · Digoxin. This increases the force of the heartbeat.  Healthy behavior changes   These may include:  · Reaching and maintaining a healthy weight.  · Stopping smoking or chewing tobacco.  · Eating heart-healthy foods.  · Limiting or avoiding alcohol.  · Stopping use of street drugs (illegal drugs).  · Physical activity.  Other treatments   These may include:  · Surgery to open blocked coronary arteries or repair damaged heart valves.  · Placement of a biventricular pacemaker to improve heart muscle function (cardiac resynchronization therapy). This device paces both the right ventricle and left ventricle.  · Placement of a device to treat serious abnormal heart rhythms (implantable cardioverter defibrillator, or ICD).  · Placement of a device to improve the pumping ability of the heart (left ventricular assist device, or LVAD).  · Heart transplant. This can cure heart failure, and it is considered for certain patients who do not improve with other therapies.  Follow these instructions at home:  Medicines  · Take over-the-counter and prescription medicines only as told by your health care provider. Medicines are important in reducing the workload of your heart, slowing the progression of heart failure, and improving your symptoms.  ¨ Do not stop taking your medicine unless your health care provider told you to do that.  ¨ Do not skip any dose of medicine.  ¨ Refill your prescriptions before you run out of medicine. You need your medicines every day.  Eating and drinking  · Eat heart-healthy foods. Talk with a dietitian to make an eating plan that is right for you.  ¨ Choose foods that contain no trans fat and are low in saturated fat and cholesterol. Healthy choices include fresh or frozen fruits and vegetables, fish, lean meats, legumes, fat-free or low-fat dairy products, and whole-grain or high-fiber foods.  ¨ Limit salt (sodium) if directed by your  health care provider. Sodium restriction may reduce symptoms of heart failure. Ask a dietitian to recommend heart-healthy seasonings.  ¨ Use healthy cooking methods instead of frying. Healthy methods include roasting, grilling, broiling, baking, poaching, steaming, and stir-frying.  · Limit your fluid intake if directed by your health care provider. Fluid restriction may reduce symptoms of heart failure.  Lifestyle  · Stop smoking or using chewing tobacco. Nicotine and tobacco can damage your heart and your blood vessels. Do not use nicotine gum or patches before talking to your health care provider.  · Limit alcohol intake to no more than 1 drink per day for non-pregnant women and 2 drinks per day for men. One drink equals 12 oz of beer, 5 oz of wine, or 1½ oz of hard liquor.  ¨ Drinking more than that is harmful to your heart. Tell your health care provider if you drink alcohol several times a week.  ¨ Talk with your health care provider about whether any level of alcohol use is safe for you.  ¨ If your heart has already been damaged by alcohol or you have severe heart failure, drinking alcohol should be stopped completely.  · Stop use of illegal drugs.  · Lose weight if directed by your health care provider. Weight loss may reduce symptoms of heart failure.  · Do moderate physical activity if directed by your health care provider. People who are elderly and people with severe heart failure should consult with a health care provider for physical activity recommendations.  Monitor important information  · Weigh yourself every day. Keeping track of your weight daily helps you to notice excess fluid sooner.  ¨ Weigh yourself every morning after you urinate and before you eat breakfast.  ¨ Wear the same amount of clothing each time you weigh yourself.  ¨ Record your daily weight. Provide your health care provider with your weight record.  · Monitor and record your blood pressure as told by your health care  provider.  · Check your pulse as told by your health care provider.  Dealing with extreme temperatures  · If the weather is extremely hot:  ¨ Avoid vigorous physical activity.  ¨ Use air conditioning or fans or seek a cooler location.  ¨ Avoid caffeine and alcohol.  ¨ Wear loose-fitting, lightweight, and light-colored clothing.  · If the weather is extremely cold:  ¨ Avoid vigorous physical activity.  ¨ Layer your clothes.  ¨ Wear mittens or gloves, a hat, and a scarf when you go outside.  ¨ Avoid alcohol.  General instructions  · Manage other health conditions such as hypertension, diabetes, thyroid disease, or abnormal heart rhythms as told by your health care provider.  · Learn to manage stress. If you need help to do this, ask your health care provider.  · Plan rest periods when fatigued.  · Get ongoing education and support as needed.  · Participate in or seek rehabilitation as needed to maintain or improve independence and quality of life.  · Stay up to date with immunizations. Keeping current on pneumococcal and influenza immunizations is especially important to prevent respiratory infections.  · Keep all follow-up visits as told by your health care provider. This is important.  Contact a health care provider if:  · You have a rapid weight gain.  · You have increasing shortness of breath that is unusual for you.  · You are unable to participate in your usual physical activities.  · You tire easily.  · You cough more than normal, especially with physical activity.  · You have any swelling or more swelling in areas such as your hands, feet, ankles, or abdomen.  · You are unable to sleep because it is hard to breathe.  · You feel like your heart is beating quickly (palpitations).  · You become dizzy or light-headed when you stand up.  Get help right away if:  · You have difficulty breathing.  · You notice or your family notices a change in your awareness, such as having trouble staying awake or having difficulty  with concentration.  · You have pain or discomfort in your chest.  · You have an episode of fainting (syncope).  This information is not intended to replace advice given to you by your health care provider. Make sure you discuss any questions you have with your health care provider.  Document Released: 12/18/2006 Document Revised: 08/22/2017 Document Reviewed: 07/12/2017  [employer] Patient Education © 2017 [employer]

## 2018-09-05 NOTE — PROGRESS NOTES
"Subjective:      Rangel Malik is a 47 y.o. male who presents with Shoulder Pain (Rt shoulder has pain, no specific injury, SOB when sitting or laying down. )            Shortness of Breath   This is a chronic problem. Episode onset: 6 months ago. The problem occurs intermittently. Pertinent negatives include no chest pain, ear pain, fever, hemoptysis, sore throat, sputum production or wheezing. The symptoms are aggravated by lying flat. Associated symptoms comments: Shoulder pain and swelling in the feet.. The patient has no known risk factors for DVT/PE. He has tried nothing for the symptoms.       Review of Systems   Constitutional: Negative for chills, fever and malaise/fatigue.   HENT: Negative for congestion, ear pain and sore throat.    Respiratory: Positive for cough. Negative for hemoptysis, sputum production, shortness of breath and wheezing.    Cardiovascular: Negative for chest pain and palpitations.   Musculoskeletal:        Right shoulder pain     All other systems reviewed and are negative.    PMH:  has a past medical history of Low back pain (9/26/2011).  MEDS:   Current Outpatient Prescriptions:   •  acetaminophen (TYLENOL) 325 MG Tab, Take 650 mg by mouth every 6 hours as needed for Mild Pain., Disp: , Rfl:   ALLERGIES: No Known Allergies  SURGHX:   Past Surgical History:   Procedure Laterality Date   • VASECTOMY       SOCHX:  reports that he has never smoked. He has never used smokeless tobacco. He reports that he drinks alcohol. He reports that he does not use drugs.  FH: Family history was reviewed, no pertinent findings to report  Medications, Allergies, and current problem list reviewed today in Epic       Objective:     /82   Pulse 66   Temp 36.7 °C (98.1 °F)   Ht 1.753 m (5' 9\")   Wt 115.2 kg (254 lb)   SpO2 96%   BMI 37.51 kg/m²      Physical Exam   Constitutional: He is oriented to person, place, and time. He appears well-developed and well-nourished. He is active.  Non-toxic " appearance. He does not have a sickly appearance. He does not appear ill. No distress. He is not intubated.   HENT:   Head: Normocephalic and atraumatic.   Right Ear: Hearing, tympanic membrane, external ear and ear canal normal.   Left Ear: Hearing, tympanic membrane, external ear and ear canal normal.   Nose: Nose normal.   Mouth/Throat: Uvula is midline, oropharynx is clear and moist and mucous membranes are normal.   Eyes: Conjunctivae, EOM and lids are normal.   Neck: Normal range of motion and full passive range of motion without pain. Neck supple.   Cardiovascular: Normal rate, regular rhythm, S1 normal, S2 normal and normal heart sounds.  Exam reveals no gallop and no friction rub.    No murmur heard.  Pulmonary/Chest: Effort normal and breath sounds normal. No accessory muscle usage. No apnea, no tachypnea and no bradypnea. He is not intubated. No respiratory distress. He has no decreased breath sounds. He has no wheezes. He has no rhonchi. He has no rales. He exhibits no tenderness.   Musculoskeletal: Normal range of motion. He exhibits no edema.   Neurological: He is alert and oriented to person, place, and time.   Skin: Skin is warm and dry.   Psychiatric: He has a normal mood and affect. His speech is normal and behavior is normal. Judgment and thought content normal.   Vitals reviewed.         9/5/2018 9:35 AM    HISTORY/REASON FOR EXAM:  Right shoulder pain.    TECHNIQUE/EXAM DESCRIPTION AND NUMBER OF VIEWS:  3 views of the RIGHT shoulder.    COMPARISON: None    FINDINGS:  Bone mineralization is normal.  There is no evidence of fracture or dislocation.  There is no evidence of arthropathy.  Soft tissues are normal.   Impression       No evidence of acute fracture or dislocation.          9/5/2018 9:35 AM    HISTORY/REASON FOR EXAM:  Shortness of breath    TECHNIQUE/EXAM DESCRIPTION AND NUMBER OF VIEWS:  Two views of the chest.    COMPARISON: 11/5/2018    FINDINGS:  There is atelectasis within the lung  bases bilaterally.  The heart is enlarged.  There is no evidence of pleural effusion.  Soft tissues and bony structures are unremarkable.     Impression       1.  Cardiomegaly.    2.  Bibasilar atelectasis.       Assessment/Plan:   Pt is an obese 47 yr old male who presents with complaint of SOB while laying down intermittently for 6 months- currently not symptomatic.  He also complains of right shoulder pain and swelling in the legs off and on as well. No history of DM, smoking, htn.   Vitals normal.  Lungs clear.  No pitting edema in legs at this time.  Chest x ray shows cardiomegaly and bibasilar atelectasis without evidence of effusion.   Chronic symptoms and chest xray concerning for undiagnosed CHF.  No PCP at this time.  I will establish his for a PCP today.  ED precautions given.    1. Orthopnea    - DX-CHEST-2 VIEWS; Future    2. Cardiomegaly      3. Chronic right shoulder pain    - DX-SHOULDER 2+ RIGHT; Future    4. Obesity (BMI 35.0-39.9 without comorbidity)    - Patient identified as having weight management issue.  Appropriate orders and counseling given.    Differential diagnosis, natural history, supportive care discussed. Follow-up with primary care provider within 7-10 days, emergency room precautions discussed.  Patient and/or family appears understanding of information.  Handout and review of patients diagnosis and treatment was discussed extensively.

## 2018-10-30 ENCOUNTER — TELEPHONE (OUTPATIENT)
Dept: MEDICAL GROUP | Facility: PHYSICIAN GROUP | Age: 48
End: 2018-10-30

## 2018-10-30 NOTE — TELEPHONE ENCOUNTER
Future Appointments       Provider Department Center    10/31/2018 8:35 AM Stefania Chowdhury M.D. [facility]        NEW PATIENT VISIT PRE-VISIT PLANNING    1.  EpicCare Patient is checked in Patient Demographics? YES    2.  Immunizations were updated in Epic using WebIZ?: No WebIZ record       •  Web Iz Recommendations: FLU and TDAP    3.  Is this appointment scheduled as a Hospital Follow-Up? No    4.  Patient is due for the following Health Maintenance Topics:   Health Maintenance Due   Topic Date Due   • IMM DTaP/Tdap/Td Vaccine (1 - Tdap) 11/04/1991   • IMM INFLUENZA (1) 09/01/2018       5.  Reviewed/Updated the following with patient:   •   Preferred Pharmacy? NO       •   Preferred Lab? NO       •   Preferred Communication? NO       •   Allergies? NO       •   Medications? NO       •   Social History? NO       •   Family History (document living status of immediate family members and if + hx of cancer, diabetes, hypertension, hyperlipidemia, heart attack, stroke) NO    6.  Updated Care Team?       •   DME Company (gait device, O2, CPAP, etc.) NO       •   Other Specialists (eye doctor, derm, GYN, cardiology, endo, etc): NO    7.  MDX printed for Provider? NO    8.  Patient was informed to arrive 15 min prior to their   scheduled appointment and bring in their medication bottles. LVM was unable to get in contact with Pt. prior to visit to complete PVP.

## 2018-10-31 ENCOUNTER — APPOINTMENT (OUTPATIENT)
Dept: RADIOLOGY | Facility: IMAGING CENTER | Age: 48
End: 2018-10-31
Attending: FAMILY MEDICINE
Payer: COMMERCIAL

## 2018-10-31 ENCOUNTER — OFFICE VISIT (OUTPATIENT)
Dept: MEDICAL GROUP | Facility: PHYSICIAN GROUP | Age: 48
End: 2018-10-31
Payer: COMMERCIAL

## 2018-10-31 ENCOUNTER — HOSPITAL ENCOUNTER (OUTPATIENT)
Dept: LAB | Facility: MEDICAL CENTER | Age: 48
End: 2018-10-31
Attending: FAMILY MEDICINE
Payer: COMMERCIAL

## 2018-10-31 VITALS
TEMPERATURE: 98 F | BODY MASS INDEX: 43.79 KG/M2 | DIASTOLIC BLOOD PRESSURE: 88 MMHG | HEART RATE: 74 BPM | OXYGEN SATURATION: 95 % | SYSTOLIC BLOOD PRESSURE: 138 MMHG | HEIGHT: 67 IN | RESPIRATION RATE: 18 BRPM | WEIGHT: 279 LBS

## 2018-10-31 DIAGNOSIS — E66.9 OBESITY (BMI 35.0-39.9 WITHOUT COMORBIDITY): ICD-10-CM

## 2018-10-31 DIAGNOSIS — M54.50 LUMBAR BACK PAIN: ICD-10-CM

## 2018-10-31 DIAGNOSIS — Z13.1 SCREENING FOR DIABETES MELLITUS: ICD-10-CM

## 2018-10-31 DIAGNOSIS — R06.02 SHORTNESS OF BREATH ON EXERTION: ICD-10-CM

## 2018-10-31 DIAGNOSIS — M25.561 CHRONIC PAIN OF BOTH KNEES: ICD-10-CM

## 2018-10-31 DIAGNOSIS — M25.512 CHRONIC LEFT SHOULDER PAIN: ICD-10-CM

## 2018-10-31 DIAGNOSIS — R21 RASH AND NONSPECIFIC SKIN ERUPTION: ICD-10-CM

## 2018-10-31 DIAGNOSIS — G89.29 CHRONIC LEFT SHOULDER PAIN: ICD-10-CM

## 2018-10-31 DIAGNOSIS — G89.29 CHRONIC PAIN OF BOTH KNEES: ICD-10-CM

## 2018-10-31 DIAGNOSIS — Z13.6 SCREENING FOR CARDIOVASCULAR CONDITION: ICD-10-CM

## 2018-10-31 DIAGNOSIS — M79.89 LEG SWELLING: ICD-10-CM

## 2018-10-31 DIAGNOSIS — M25.562 CHRONIC PAIN OF BOTH KNEES: ICD-10-CM

## 2018-10-31 PROBLEM — R07.9 CHEST PAIN: Status: RESOLVED | Noted: 2018-01-05 | Resolved: 2018-10-31

## 2018-10-31 LAB
ALBUMIN SERPL BCP-MCNC: 4.6 G/DL (ref 3.2–4.9)
ALBUMIN/GLOB SERPL: 1.7 G/DL
ALP SERPL-CCNC: 49 U/L (ref 30–99)
ALT SERPL-CCNC: 20 U/L (ref 2–50)
ANION GAP SERPL CALC-SCNC: 8 MMOL/L (ref 0–11.9)
AST SERPL-CCNC: 14 U/L (ref 12–45)
BILIRUB SERPL-MCNC: 0.4 MG/DL (ref 0.1–1.5)
BUN SERPL-MCNC: 14 MG/DL (ref 8–22)
CALCIUM SERPL-MCNC: 9.8 MG/DL (ref 8.5–10.5)
CHLORIDE SERPL-SCNC: 105 MMOL/L (ref 96–112)
CHOLEST SERPL-MCNC: 204 MG/DL (ref 100–199)
CO2 SERPL-SCNC: 29 MMOL/L (ref 20–33)
CREAT SERPL-MCNC: 0.94 MG/DL (ref 0.5–1.4)
EST. AVERAGE GLUCOSE BLD GHB EST-MCNC: 143 MG/DL
GLOBULIN SER CALC-MCNC: 2.7 G/DL (ref 1.9–3.5)
GLUCOSE SERPL-MCNC: 91 MG/DL (ref 65–99)
HBA1C MFR BLD: 6.6 % (ref 0–5.6)
HDLC SERPL-MCNC: 41 MG/DL
LDLC SERPL CALC-MCNC: 128 MG/DL
POTASSIUM SERPL-SCNC: 4.4 MMOL/L (ref 3.6–5.5)
PROT SERPL-MCNC: 7.3 G/DL (ref 6–8.2)
SODIUM SERPL-SCNC: 142 MMOL/L (ref 135–145)
TRIGL SERPL-MCNC: 173 MG/DL (ref 0–149)

## 2018-10-31 PROCEDURE — 72100 X-RAY EXAM L-S SPINE 2/3 VWS: CPT | Mod: 26 | Performed by: FAMILY MEDICINE

## 2018-10-31 PROCEDURE — 83036 HEMOGLOBIN GLYCOSYLATED A1C: CPT

## 2018-10-31 PROCEDURE — 80061 LIPID PANEL: CPT

## 2018-10-31 PROCEDURE — 36415 COLL VENOUS BLD VENIPUNCTURE: CPT

## 2018-10-31 PROCEDURE — 80053 COMPREHEN METABOLIC PANEL: CPT

## 2018-10-31 PROCEDURE — 73030 X-RAY EXAM OF SHOULDER: CPT | Mod: 26,RT | Performed by: FAMILY MEDICINE

## 2018-10-31 PROCEDURE — 73560 X-RAY EXAM OF KNEE 1 OR 2: CPT | Mod: 26,RT | Performed by: FAMILY MEDICINE

## 2018-10-31 PROCEDURE — 99214 OFFICE O/P EST MOD 30 MIN: CPT | Performed by: FAMILY MEDICINE

## 2018-10-31 NOTE — PROGRESS NOTES
cc: shoulder pain, SOB      Subjective:     Rangel Malik is a 47 y.o. male presenting for the following:     Patient recently seen in Urgent Care for SOB with laying flat and also with shoulder pain.     Patient with right shoulder pain since March. This started without trauma but the pain has actually been slowly worsening, not improving. More tightness and pain with lifting arm up. He now cannot even get up off the couch using his right arm.     Lower back pain-patient with an initial injury at work a few years ago and has had relapsing/recurring lower back pain since then.  It is midline lower back pain that does not usually limit function but is flaring more and more in the last year.  He does not have any weakness in his legs or saddle anesthesia but sometimes if he sits a certain way he will have a mild bilateral numbness in both legs.  He also has had slowly worsening bilateral knee pain for the last few years.  Worse after sitting for a long time or down stairs.  Also will get bad by the end of the day if he walks a lot.  He does not have any weakness in the legs do not give out on him.    Patient had an episode of chest pain in January 2018.  He did have a workup that included a stress test that did not show any ischemia.  But since that time he has had worsening difficulty catching his breath with exercise. He hs a hard time even walking up one flight of stairs. He is also more SOB when he lays flat. He has then noticed some swelling in both legs at the end of some days. He does have a small ulcer on his left leg that is not healing for >1 year.       Review of systems:  All others reviewed and are negative.       Current Outpatient Prescriptions:   •  acetaminophen (TYLENOL) 325 MG Tab, Take 650 mg by mouth every 6 hours as needed for Mild Pain., Disp: , Rfl:     Allergies, past medical history, past surgical history, family history, social history reviewed and updated    Objective:     Vitals: BP  "138/88 (BP Location: Left arm, Patient Position: Sitting, BP Cuff Size: Large adult)   Pulse 74   Temp 36.7 °C (98 °F) (Temporal)   Resp 18   Ht 1.702 m (5' 7\")   Wt (!) 126.6 kg (279 lb)   SpO2 95%   BMI 43.70 kg/m²   General: Alert, pleasant, NAD  HEENT: Normocephalic.   EOMI, no icterus or pallor.  Conjunctivae and lids normal. External ears normal. Oropharynx non-erythematous, mucous membranes moist.    Neck supple.  No thyromegaly or masses palpated. No cervical or supraclavicular lymphadenopathy.  Heart: Regular rate and rhythm.  S1 and S2 normal.  No murmurs appreciated.  Respiratory: Normal respiratory effort.  Clear to auscultation bilaterally.  Abdomen: Non-distended, soft  Skin: Warm, dry, no rashes.  Musculoskeletal: Gait is normal.  Moves all extremities well.  Extremities: B/L 1+ edema. Left leg with small ulcer anterior. Legs warm. Macular rash over left anterior leg. Non-tender. +varicose veins.   Neurological: No tremors, sensation grossly intact,  tone/strength normal, gait is normal, CN2-12 grossly intact  Psych:  Affect is normal, judgement is good, memory is intact, grooming is appropriate.    Assessment/Plan:     Rangel was seen today for annual exam.    Diagnoses and all orders for this visit:    Chronic left shoulder pain: worsening since march and now limiting his function.   -     DX-SHOULDER 2+ RIGHT; Future  -     MR-SHOULDER-W/O RIGHT; Future  -     REFERRAL TO SPORTS MEDICINE    Back and knee pain chronic for years. Worse at end of day. Most likely related to obesity/arthritis. Also, knee pain may have some component of PFPS. Will refer to Sports Medicine.   Lumbar back pain  -     DX-LUMBAR SPINE-2 OR 3 VIEWS; Future  -     REFERRAL TO SPORTS MEDICINE    Chronic pain of both knees  -     DX-KNEE 2- LEFT; Future  -     DX-KNEE 2- RIGHT; Future  -     REFERRAL TO SPORTS MEDICINE      Screening for cardiovascular condition  -     LIPID PROFILE; Future    Screening for diabetes " mellitus  -     COMP METABOLIC PANEL; Future  -     HEMOGLOBIN A1C; Future    Obesity (BMI 35.0-39.9 without comorbidity) ([facility])  -     COMP METABOLIC PANEL; Future      Shortness of breath on exertion/Leg swelling/Rash and nonspecific skin eruption (on left leg: will evaluate for CHF and for arterial disease of legs. Patient with SOB with exertion and with laying flat, so possibly CHF but also possibly just related to patient's obesity and deconditioning. Will obtain Echocardiogram to characterize further.   -     EC-ECHOCARDIOGRAM COMPLETE W/O CONT; Future  -     US-ARTERY LOWER BILAT W/DOMO (COMBO) EXTREMITY; Future      Return in about 4 weeks (around 11/28/2018).

## 2018-11-30 ENCOUNTER — HOSPITAL ENCOUNTER (OUTPATIENT)
Dept: RADIOLOGY | Facility: MEDICAL CENTER | Age: 48
End: 2018-11-30
Attending: FAMILY MEDICINE
Payer: COMMERCIAL

## 2018-11-30 DIAGNOSIS — M79.89 LEG SWELLING: ICD-10-CM

## 2018-11-30 PROCEDURE — 93922 UPR/L XTREMITY ART 2 LEVELS: CPT

## 2018-12-03 ENCOUNTER — HOSPITAL ENCOUNTER (OUTPATIENT)
Dept: CARDIOLOGY | Facility: MEDICAL CENTER | Age: 48
End: 2018-12-03
Attending: FAMILY MEDICINE
Payer: COMMERCIAL

## 2018-12-03 ENCOUNTER — HOSPITAL ENCOUNTER (OUTPATIENT)
Dept: RADIOLOGY | Facility: MEDICAL CENTER | Age: 48
End: 2018-12-03
Attending: FAMILY MEDICINE
Payer: COMMERCIAL

## 2018-12-03 DIAGNOSIS — M25.512 CHRONIC LEFT SHOULDER PAIN: ICD-10-CM

## 2018-12-03 DIAGNOSIS — R21 RASH AND NONSPECIFIC SKIN ERUPTION: ICD-10-CM

## 2018-12-03 DIAGNOSIS — R06.02 SHORTNESS OF BREATH ON EXERTION: ICD-10-CM

## 2018-12-03 DIAGNOSIS — M79.89 LEG SWELLING: ICD-10-CM

## 2018-12-03 DIAGNOSIS — G89.29 CHRONIC LEFT SHOULDER PAIN: ICD-10-CM

## 2018-12-03 LAB
LV EJECT FRACT  99904: 55
LV EJECT FRACT MOD 2C 99903: 67.97
LV EJECT FRACT MOD 4C 99902: 58.42
LV EJECT FRACT MOD BP 99901: 63.8

## 2018-12-03 PROCEDURE — 93306 TTE W/DOPPLER COMPLETE: CPT | Mod: 26 | Performed by: INTERNAL MEDICINE

## 2018-12-03 PROCEDURE — 93306 TTE W/DOPPLER COMPLETE: CPT

## 2018-12-08 ENCOUNTER — HOSPITAL ENCOUNTER (OUTPATIENT)
Dept: RADIOLOGY | Facility: MEDICAL CENTER | Age: 48
End: 2018-12-08
Attending: FAMILY MEDICINE
Payer: COMMERCIAL

## 2018-12-08 DIAGNOSIS — G89.29 CHRONIC RIGHT SHOULDER PAIN: ICD-10-CM

## 2018-12-08 DIAGNOSIS — M25.511 CHRONIC RIGHT SHOULDER PAIN: ICD-10-CM

## 2018-12-08 PROCEDURE — 73221 MRI JOINT UPR EXTREM W/O DYE: CPT | Mod: RT

## 2019-01-02 ENCOUNTER — OFFICE VISIT (OUTPATIENT)
Dept: MEDICAL GROUP | Facility: PHYSICIAN GROUP | Age: 49
End: 2019-01-02
Payer: COMMERCIAL

## 2019-01-02 VITALS
TEMPERATURE: 98.1 F | HEIGHT: 69 IN | RESPIRATION RATE: 14 BRPM | BODY MASS INDEX: 41.65 KG/M2 | WEIGHT: 281.2 LBS | OXYGEN SATURATION: 92 % | HEART RATE: 86 BPM | DIASTOLIC BLOOD PRESSURE: 82 MMHG | SYSTOLIC BLOOD PRESSURE: 138 MMHG

## 2019-01-02 DIAGNOSIS — R06.02 SHORTNESS OF BREATH ON EXERTION: ICD-10-CM

## 2019-01-02 DIAGNOSIS — R73.01 IMPAIRED FASTING BLOOD SUGAR: ICD-10-CM

## 2019-01-02 PROCEDURE — 99214 OFFICE O/P EST MOD 30 MIN: CPT | Performed by: FAMILY MEDICINE

## 2019-01-02 NOTE — PROGRESS NOTES
"cc: impaired fasting glucose      Subjective:     Rangel Malik is a 48 y.o. male presenting for the following:     Impaired glucose: patient found to have a borderline diabetic HA1C of 6.6. He has not had any polyuria/polydipsia, changes in sensation, fevers, changes in vision. He is aware that his weight is a problem.     Patient also has has continued difficulty with SOB on exertion for many months.  He does not have any chest pain, palpitations, syncope.  No shortness of breath at rest.  He also did have some swelling in his lower extremities.  However, an DOMO was normal, as was an echocardiogram.  He is a non-smoker with no history of asthma.    Review of systems:  All others reviewed and are negative.       Current Outpatient Prescriptions:   •  acetaminophen (TYLENOL) 325 MG Tab, Take 650 mg by mouth every 6 hours as needed for Mild Pain., Disp: , Rfl:     Allergies, past medical history, past surgical history, family history, social history reviewed and updated    Objective:     Vitals: /82 (BP Location: Right arm, Patient Position: Sitting, BP Cuff Size: Large adult)   Pulse 86   Temp 36.7 °C (98.1 °F) (Temporal)   Resp 14   Ht 1.753 m (5' 9\")   Wt (!) 127.6 kg (281 lb 3.2 oz)   SpO2 92%   BMI 41.53 kg/m²   General: Alert, pleasant, NAD  Heart: Regular rate and rhythm.  S1 and S2 normal.  No murmurs appreciated.  Respiratory: Normal respiratory effort.  Clear to auscultation bilaterally.  Abdomen: Non-distended, soft  Skin: Warm, dry, no rashes.  Extremities: No leg edema.        Assessment/Plan:     Rangel was seen today for other and results.    Diagnoses and all orders for this visit:    Shortness of breath on exertion: Cardiac workup has been negative.  Possibly some underlying asthma or restrictive lung disease due to excess body fat.  Will obtain chest x-ray and pulmonary function test to further evaluate.  -     PULMONARY FUNCTION TESTS -Test requested: Complete Pulmonary Function " Test; Future  -     DX-CHEST-2 VIEWS; Future    Impaired fasting blood sugar: Discussed diet and exercise with patient today.  Heme globin A1c just over limit for diabetes at 6.6.  Will recheck hemoglobin A1c in 3 months.  If still in diabetic range will start ACE inhibitor and statin.  -     HEMOGLOBIN A1C; Future    Return in about 2 months (around 3/2/2019).

## 2019-04-24 ENCOUNTER — OFFICE VISIT (OUTPATIENT)
Dept: URGENT CARE | Facility: PHYSICIAN GROUP | Age: 49
End: 2019-04-24
Payer: COMMERCIAL

## 2019-04-24 VITALS
TEMPERATURE: 97.9 F | BODY MASS INDEX: 41.47 KG/M2 | OXYGEN SATURATION: 97 % | SYSTOLIC BLOOD PRESSURE: 130 MMHG | HEART RATE: 77 BPM | WEIGHT: 280 LBS | HEIGHT: 69 IN | RESPIRATION RATE: 15 BRPM | DIASTOLIC BLOOD PRESSURE: 80 MMHG

## 2019-04-24 DIAGNOSIS — R06.02 SHORTNESS OF BREATH: ICD-10-CM

## 2019-04-24 DIAGNOSIS — R07.89 ATYPICAL CHEST PAIN: ICD-10-CM

## 2019-04-24 PROCEDURE — 99213 OFFICE O/P EST LOW 20 MIN: CPT | Performed by: PHYSICIAN ASSISTANT

## 2019-04-24 PROCEDURE — 93000 ELECTROCARDIOGRAM COMPLETE: CPT | Performed by: PHYSICIAN ASSISTANT

## 2019-04-24 ASSESSMENT — ENCOUNTER SYMPTOMS
ORTHOPNEA: 0
CLAUDICATION: 0
SWOLLEN GLANDS: 0
SPUTUM PRODUCTION: 0
VOMITING: 0
LEG PAIN: 0
RHINORRHEA: 0
SORE THROAT: 0
NECK PAIN: 0
HEMOPTYSIS: 0
WHEEZING: 0
HEADACHES: 0
ABDOMINAL PAIN: 0
PND: 0
SHORTNESS OF BREATH: 1
SYNCOPE: 0
FEVER: 0

## 2019-04-24 ASSESSMENT — COPD QUESTIONNAIRES: COPD: 0

## 2019-04-24 NOTE — LETTER
April 24, 2019        Rangel Malik  [address] [place] [id number]        To Whom it May Concern,       Rangel was seen today in our office on 4/24/19. Please excuse him from work due to illness.     If you have any questions or concerns, please don't hesitate to call.        Sincerely,        Dawn Schultz P.A.-C.    Electronically Signed

## 2019-04-24 NOTE — PROGRESS NOTES
Subjective:      Rangel Malik is a 48 y.o. male who presents with Shortness of Breath            Shortness of Breath   This is a new problem. The current episode started today. The problem occurs intermittently. The problem has been rapidly improving. Associated symptoms include chest pain. Pertinent negatives include no abdominal pain, claudication, coryza, ear pain, fever, headaches, hemoptysis, leg pain, leg swelling, neck pain, orthopnea, PND, rhinorrhea, sore throat, sputum production, swollen glands, syncope, vomiting or wheezing. Exacerbated by: nothing.notes it is non exertional. The patient has no known risk factors for DVT/PE. He has tried nothing for the symptoms. There is no history of allergies, aspirin allergies, asthma, bronchiolitis, CAD, COPD or DVT.   Patient reports similar episode in the beginning of the year.  Was evaluated in the hospital for 2 days and had EKG, labs, echocardiogram, MRI and all were negative for any heart disease.  He was given referral to see pulmonology by primary care and states he has not made that appointment because he has been busy at work.  The symptoms are intermittent.  Just started recently today at 10 AM but has almost resolved.  States he had a left-sided ache in the armpit region but that has also resolved.  Does not take aspirin or any other medication.  Borderline hyperlipidemic and diabetic    Past medical history: Is not pertinent to this examination  Past surgical history: Not pertinent to this examination  Family history: Is not pertinent to this examination  Allergies: No known drug allergies  Social history: Is reviewed in Epic    Review of Systems   Constitutional: Negative for fever.   HENT: Negative for ear pain, rhinorrhea and sore throat.    Respiratory: Positive for shortness of breath. Negative for hemoptysis, sputum production and wheezing.    Cardiovascular: Positive for chest pain. Negative for orthopnea, claudication, leg swelling,  "syncope and PND.   Gastrointestinal: Negative for abdominal pain and vomiting.   Musculoskeletal: Negative for neck pain.   Neurological: Negative for headaches.          Objective:     /80   Pulse 77   Temp 36.6 °C (97.9 °F) (Temporal)   Resp 15   Ht 1.753 m (5' 9\")   Wt (!) 127 kg (280 lb)   SpO2 97%   BMI 41.35 kg/m²      Physical Exam       Gen.: Patient is A and O ×3, no acute distress, well-nourished well-hydrated.  Obese  Vitals: Are listed and unremarkable  HEENT: Heads normocephalic, atraumatic, PERRLA, extraocular movements intact, TMs and oropharynx clear  Neck: Soft supple without cervical lymphadenopathy  Cardiovascular: Regular rate and rhythm normal S1 and S2. No murmurs, rubs or gallops  Lungs are clear to auscultation bilaterally. no wheezes rales or rhonchi  Abdomen is soft, nontender, nondistended with good bowel sounds, no hepatosplenomegaly  Skin: Is well perfused without evidence of rash or lesions  Neurological:  cranial nerves II through XII were assessed and intact.  Musculoskeletal: Full range of motion, 5 out of 5 strength against resistance  Neurovascularly: Intact with a 2 second cap refill, good distal pulses    Urgent CARE course: EKG was done and reviewed by me.  It shows normal sinus rhythm with a ventricular rate of 71.  Some late transition abnormal R wave progression.  No ST abnormalities or Q waves noted       Assessment/Plan:     1. Shortness of breath  Discussed that patient needs further evaluation.  Recommend ER evaluation to rule out PE, acute coronary syndrome, CHF.  Patient defers.  Discussed risks of refusal at length including incapacitation and death.  Please follow-up in the ER if symptoms return  - EKG - Clinic Performed  -  AMA/Refusal of Treatment  - REFERRAL TO CARDIOLOGY    2. Atypical chest pain  Please see assessment #1  - EKG - Clinic Performed  -  AMA/Refusal of Treatment  - REFERRAL TO CARDIOLOGY      "

## 2019-06-24 ENCOUNTER — OFFICE VISIT (OUTPATIENT)
Dept: URGENT CARE | Facility: PHYSICIAN GROUP | Age: 49
End: 2019-06-24
Payer: COMMERCIAL

## 2019-06-24 ENCOUNTER — APPOINTMENT (OUTPATIENT)
Dept: RADIOLOGY | Facility: IMAGING CENTER | Age: 49
End: 2019-06-24
Attending: PHYSICIAN ASSISTANT
Payer: COMMERCIAL

## 2019-06-24 VITALS
OXYGEN SATURATION: 100 % | BODY MASS INDEX: 35.7 KG/M2 | WEIGHT: 241 LBS | SYSTOLIC BLOOD PRESSURE: 120 MMHG | HEIGHT: 69 IN | HEART RATE: 74 BPM | TEMPERATURE: 98.7 F | RESPIRATION RATE: 16 BRPM | DIASTOLIC BLOOD PRESSURE: 84 MMHG

## 2019-06-24 DIAGNOSIS — M79.671 RIGHT FOOT PAIN: ICD-10-CM

## 2019-06-24 DIAGNOSIS — M79.89 SWELLING OF RIGHT FOOT: ICD-10-CM

## 2019-06-24 PROCEDURE — 99214 OFFICE O/P EST MOD 30 MIN: CPT | Performed by: PHYSICIAN ASSISTANT

## 2019-06-24 PROCEDURE — 73630 X-RAY EXAM OF FOOT: CPT | Mod: TC,RT | Performed by: PHYSICIAN ASSISTANT

## 2019-06-24 RX ORDER — METHYLPREDNISOLONE 4 MG/1
TABLET ORAL
Qty: 21 TAB | Refills: 0 | Status: SHIPPED | OUTPATIENT
Start: 2019-06-24 | End: 2019-08-08

## 2019-06-24 ASSESSMENT — ENCOUNTER SYMPTOMS
JOINT SWELLING: 0
RESPIRATORY NEGATIVE: 1
CHILLS: 0
WEAKNESS: 0
COUGH: 0
GASTROINTESTINAL NEGATIVE: 1
ARTHRALGIAS: 0
FEVER: 0
NAUSEA: 0
NUMBNESS: 0
CARDIOVASCULAR NEGATIVE: 1
VOMITING: 0
FALLS: 0
NEUROLOGICAL NEGATIVE: 1

## 2019-06-24 NOTE — LETTER
June 24, 2019         Patient: Rangel Malik   YOB: 1970   Date of Visit: 6/24/2019           To Whom it May Concern:    Rangel Malik was seen in my clinic on 6/24/2019. He may return to work on Weds. June 26th.    If you have any questions or concerns, please don't hesitate to call.        Sincerely,           Matthew Oates P.A.-C.  Electronically Signed

## 2019-06-24 NOTE — PROGRESS NOTES
"Subjective:      Rangel Malik is a 48 y.o. male who presents with Knee Pain (Right knee and foot pain x 3 days.  )            Foot Problem   This is a new problem. The current episode started in the past 7 days. The problem occurs constantly. The problem has been unchanged. Pertinent negatives include no arthralgias, chills, congestion, coughing, fever, joint swelling, nausea, numbness, rash, vomiting or weakness. The symptoms are aggravated by walking and standing. He has tried nothing for the symptoms. The treatment provided no relief.       Review of Systems   Constitutional: Negative for chills and fever.   HENT: Negative for congestion.    Respiratory: Negative.  Negative for cough.    Cardiovascular: Negative.    Gastrointestinal: Negative.  Negative for nausea and vomiting.   Musculoskeletal: Positive for joint pain (Right foot). Negative for arthralgias, falls and joint swelling.   Skin: Negative for rash.   Neurological: Negative.  Negative for weakness and numbness.          Objective:     /84   Pulse 74   Temp 37.1 °C (98.7 °F) (Temporal)   Resp 16   Ht 1.753 m (5' 9\")   Wt 109.3 kg (241 lb)   SpO2 100%   BMI 35.59 kg/m²      Physical Exam   Constitutional: He is oriented to person, place, and time. He appears well-developed and well-nourished. No distress.   HENT:   Head: Normocephalic and atraumatic.   Eyes: Conjunctivae and EOM are normal.   Neck: Normal range of motion. Neck supple.   Cardiovascular: Normal rate, regular rhythm and normal heart sounds.    No murmur heard.  Pulmonary/Chest: Effort normal and breath sounds normal. No respiratory distress. He has no wheezes.   Musculoskeletal:        Right ankle: Normal.        Right foot: There is tenderness. There is normal range of motion, no bony tenderness, no swelling, no crepitus and no deformity.        Feet:    Neurological: He is alert and oriented to person, place, and time.   Skin: Skin is warm and dry. He is not " diaphoretic.   Psychiatric: He has a normal mood and affect. His behavior is normal. Judgment and thought content normal.   Nursing note and vitals reviewed.              Assessment/Plan:     1. Right foot pain  DX-FOOT-COMPLETE 3+ RIGHT    methylPREDNISolone (MEDROL DOSEPAK) 4 MG Tablet Therapy Pack   2. Swelling of right foot  methylPREDNISolone (MEDROL DOSEPAK) 4 MG Tablet Therapy Pack     Xray: no fracture or dislocation by my read. Radiology review pending    No signs of bony abnormality.  Possible soft tissue injury such as plantar fasciitis.  Less likely gout.  OTC meds and conservative measures as discussed    Return to clinic or go to ED if symptoms worsen or persist. Indications for ED discussed at length. Patient voices understanding. Follow-up with your primary care provider in 3-5 days. Red flags discussed. All side effects of medication discussed including allergic response, GI upset, tendon injury, etc.    Please note that this dictation was created using voice recognition software. I have made every reasonable attempt to correct obvious errors, but I expect that there are errors of grammar and possibly content that I did not discover before finalizing the note.

## 2019-08-08 ENCOUNTER — OFFICE VISIT (OUTPATIENT)
Dept: MEDICAL GROUP | Facility: PHYSICIAN GROUP | Age: 49
End: 2019-08-08
Payer: COMMERCIAL

## 2019-08-08 VITALS
WEIGHT: 270 LBS | SYSTOLIC BLOOD PRESSURE: 150 MMHG | BODY MASS INDEX: 39.99 KG/M2 | HEIGHT: 69 IN | TEMPERATURE: 98.6 F | HEART RATE: 92 BPM | DIASTOLIC BLOOD PRESSURE: 90 MMHG | OXYGEN SATURATION: 96 %

## 2019-08-08 DIAGNOSIS — Z13.6 SCREENING FOR CARDIOVASCULAR CONDITION: ICD-10-CM

## 2019-08-08 DIAGNOSIS — R06.83 SNORING: ICD-10-CM

## 2019-08-08 DIAGNOSIS — R06.02 SHORTNESS OF BREATH: ICD-10-CM

## 2019-08-08 DIAGNOSIS — R73.01 IMPAIRED FASTING BLOOD SUGAR: ICD-10-CM

## 2019-08-08 DIAGNOSIS — M79.89 LEG SWELLING: ICD-10-CM

## 2019-08-08 DIAGNOSIS — R06.02 SHORTNESS OF BREATH ON EXERTION: ICD-10-CM

## 2019-08-08 PROCEDURE — 99214 OFFICE O/P EST MOD 30 MIN: CPT | Performed by: FAMILY MEDICINE

## 2019-08-08 ASSESSMENT — PATIENT HEALTH QUESTIONNAIRE - PHQ9
CLINICAL INTERPRETATION OF PHQ2 SCORE: 3
SUM OF ALL RESPONSES TO PHQ QUESTIONS 1-9: 14
5. POOR APPETITE OR OVEREATING: 3 - NEARLY EVERY DAY

## 2019-08-08 NOTE — LETTER
August 8, 2019        Sudha Malik is a patient of our clinic and he did have an appointment today and he has another tomorrow.                             Stefania Chowdhury M.D.

## 2019-08-08 NOTE — PROGRESS NOTES
"cc: Shortness of breath on exertion      Subjective:     Rangel Malik is a 48 y.o. male presenting for the following:     Patient first had an episode of chest pain and some tightness with breathing back in January 2018.  He did have a full cardiac work-up at that time that was negative for any cardiac pathology.  But he then noticed over the course of 2018 that he was having worsening shortness of breath on exertion.  He denies any chest pain with this, he just feels a hard time catching his breath and a chest tightness when he tries to exercise.  No lightheadedness, palpitations.    He does have mild leg swelling, worse on left but present for many years.  This is not necessarily worsening from the past.    Patient was never a regular smoker.    Review of systems:  All others reviewed and are negative.       Current Outpatient Medications:   •  acetaminophen (TYLENOL) 325 MG Tab, Take 650 mg by mouth every 6 hours as needed for Mild Pain., Disp: , Rfl:     Allergies, past medical history, past surgical history, family history, social history reviewed and updated    Objective:     Vitals: /90 (BP Location: Left arm, Patient Position: Sitting)   Pulse 92   Temp 37 °C (98.6 °F) (Temporal)   Ht 1.753 m (5' 9\")   Wt 122.5 kg (270 lb)   SpO2 96%   BMI 39.87 kg/m²   General: Alert, pleasant, NAD  HEENT: Normocephalic.    Neck supple.  No thyromegaly or masses palpated. No cervical or supraclavicular lymphadenopathy.  Heart: Regular rate and rhythm.  S1 and S2 normal.  No murmurs appreciated.  Respiratory: Normal respiratory effort.  Clear to auscultation bilaterally.  Extremities: 1+ leg edema bilaterally.      Assessment/Plan:     Rangel was seen today for dizziness and shortness of breath.    Diagnoses and all orders for this visit:    Patient with worsening shortness of breath over the last year and did have a negative cardiac work-up at the beginning of 2018.  Unlikely cardiac as he did have this " work-up and he is also not having any chest pain or palpitations.  However, he does have some leg swelling so will check BNP to ensure no signs of heart failure.  We will also check for thyroid dysfunction.  He also does have some impaired fasting blood sugar so will screen for diabetes.  Patient is overweight so possibly restrictive disease due to this.  Also, he is sleepy during the day and is a heavy snorer, so will screen for sleep apnea.  Will check CT scan to evaluate for any pulmonary fibrosis.  Patient with no history of asthma or smoking.    Shortness of breath on exertion  -     CBC WITH DIFFERENTIAL; Future  -     Comp Metabolic Panel; Future  -     FREE THYROXINE; Future  -     TSH; Future  -     proBrain Natriuretic Peptide, NT; Future  -     REFERRAL TO SLEEP STUDIES  -     PULMONARY FUNCTION TESTS -Test requested: Complete Pulmonary Function Test; Future  -     REFERRAL TO PULMONOLOGY  -     CT-CHEST (THORAX) W/O; Future    Leg swelling  -     proBrain Natriuretic Peptide, NT; Future    Shortness of breath  -     CT-CHEST (THORAX) W/O; Future    Screening for cardiovascular condition  -     Lipid Profile; Future    Impaired fasting blood sugar  -     HEMOGLOBIN A1C; Future    Snoring    -Patient did score 14 on the PHQ 9.  He denies any suicidal or homicidal ideation.  He does feel that there is some overlap with his low mood and his current physical health and shortness of breath.  He declines any referrals currently and would prefer to deal with his shortness of breath first but he assures me he will return to clinic if there is any worsening in his mood.    Return in about 3 months (around 11/8/2019), or if symptoms worsen or fail to improve.

## 2019-08-09 ENCOUNTER — HOSPITAL ENCOUNTER (OUTPATIENT)
Dept: LAB | Facility: MEDICAL CENTER | Age: 49
End: 2019-08-09
Attending: FAMILY MEDICINE
Payer: COMMERCIAL

## 2019-08-09 DIAGNOSIS — R73.01 IMPAIRED FASTING BLOOD SUGAR: ICD-10-CM

## 2019-08-09 DIAGNOSIS — Z13.6 SCREENING FOR CARDIOVASCULAR CONDITION: ICD-10-CM

## 2019-08-09 DIAGNOSIS — M79.89 LEG SWELLING: ICD-10-CM

## 2019-08-09 DIAGNOSIS — R06.02 SHORTNESS OF BREATH ON EXERTION: ICD-10-CM

## 2019-08-09 LAB
ALBUMIN SERPL BCP-MCNC: 4.3 G/DL (ref 3.2–4.9)
ALBUMIN/GLOB SERPL: 1.3 G/DL
ALP SERPL-CCNC: 57 U/L (ref 30–99)
ALT SERPL-CCNC: 16 U/L (ref 2–50)
ANION GAP SERPL CALC-SCNC: 8 MMOL/L (ref 0–11.9)
AST SERPL-CCNC: 16 U/L (ref 12–45)
BASOPHILS # BLD AUTO: 0.2 % (ref 0–1.8)
BASOPHILS # BLD: 0.01 K/UL (ref 0–0.12)
BILIRUB SERPL-MCNC: 0.5 MG/DL (ref 0.1–1.5)
BUN SERPL-MCNC: 12 MG/DL (ref 8–22)
CALCIUM SERPL-MCNC: 9.4 MG/DL (ref 8.5–10.5)
CHLORIDE SERPL-SCNC: 106 MMOL/L (ref 96–112)
CHOLEST SERPL-MCNC: 205 MG/DL (ref 100–199)
CO2 SERPL-SCNC: 25 MMOL/L (ref 20–33)
CREAT SERPL-MCNC: 1.06 MG/DL (ref 0.5–1.4)
EOSINOPHIL # BLD AUTO: 0.15 K/UL (ref 0–0.51)
EOSINOPHIL NFR BLD: 2.3 % (ref 0–6.9)
ERYTHROCYTE [DISTWIDTH] IN BLOOD BY AUTOMATED COUNT: 46.2 FL (ref 35.9–50)
EST. AVERAGE GLUCOSE BLD GHB EST-MCNC: 126 MG/DL
FASTING STATUS PATIENT QL REPORTED: NORMAL
GLOBULIN SER CALC-MCNC: 3.2 G/DL (ref 1.9–3.5)
GLUCOSE SERPL-MCNC: 111 MG/DL (ref 65–99)
HBA1C MFR BLD: 6 % (ref 0–5.6)
HCT VFR BLD AUTO: 52.5 % (ref 42–52)
HDLC SERPL-MCNC: 37 MG/DL
HGB BLD-MCNC: 17.2 G/DL (ref 14–18)
IMM GRANULOCYTES # BLD AUTO: 0.03 K/UL (ref 0–0.11)
IMM GRANULOCYTES NFR BLD AUTO: 0.5 % (ref 0–0.9)
LDLC SERPL CALC-MCNC: 134 MG/DL
LYMPHOCYTES # BLD AUTO: 1.83 K/UL (ref 1–4.8)
LYMPHOCYTES NFR BLD: 28.2 % (ref 22–41)
MCH RBC QN AUTO: 29.4 PG (ref 27–33)
MCHC RBC AUTO-ENTMCNC: 32.8 G/DL (ref 33.7–35.3)
MCV RBC AUTO: 89.6 FL (ref 81.4–97.8)
MONOCYTES # BLD AUTO: 0.45 K/UL (ref 0–0.85)
MONOCYTES NFR BLD AUTO: 6.9 % (ref 0–13.4)
NEUTROPHILS # BLD AUTO: 4.01 K/UL (ref 1.82–7.42)
NEUTROPHILS NFR BLD: 61.9 % (ref 44–72)
NRBC # BLD AUTO: 0 K/UL
NRBC BLD-RTO: 0 /100 WBC
NT-PROBNP SERPL IA-MCNC: 46 PG/ML (ref 0–125)
PLATELET # BLD AUTO: 184 K/UL (ref 164–446)
PMV BLD AUTO: 11.2 FL (ref 9–12.9)
POTASSIUM SERPL-SCNC: 4.3 MMOL/L (ref 3.6–5.5)
PROT SERPL-MCNC: 7.5 G/DL (ref 6–8.2)
RBC # BLD AUTO: 5.86 M/UL (ref 4.7–6.1)
SODIUM SERPL-SCNC: 139 MMOL/L (ref 135–145)
T4 FREE SERPL-MCNC: 0.85 NG/DL (ref 0.53–1.43)
TRIGL SERPL-MCNC: 169 MG/DL (ref 0–149)
TSH SERPL DL<=0.005 MIU/L-ACNC: 1.35 UIU/ML (ref 0.38–5.33)
WBC # BLD AUTO: 6.5 K/UL (ref 4.8–10.8)

## 2019-08-09 PROCEDURE — 36415 COLL VENOUS BLD VENIPUNCTURE: CPT

## 2019-08-09 PROCEDURE — 80053 COMPREHEN METABOLIC PANEL: CPT

## 2019-08-09 PROCEDURE — 85025 COMPLETE CBC W/AUTO DIFF WBC: CPT

## 2019-08-09 PROCEDURE — 84439 ASSAY OF FREE THYROXINE: CPT

## 2019-08-09 PROCEDURE — 83880 ASSAY OF NATRIURETIC PEPTIDE: CPT

## 2019-08-09 PROCEDURE — 83036 HEMOGLOBIN GLYCOSYLATED A1C: CPT

## 2019-08-09 PROCEDURE — 80061 LIPID PANEL: CPT

## 2019-08-09 PROCEDURE — 84443 ASSAY THYROID STIM HORMONE: CPT

## 2019-08-20 ENCOUNTER — HOSPITAL ENCOUNTER (OUTPATIENT)
Dept: RADIOLOGY | Facility: MEDICAL CENTER | Age: 49
End: 2019-08-20
Attending: FAMILY MEDICINE
Payer: COMMERCIAL

## 2019-08-20 DIAGNOSIS — R06.02 SHORTNESS OF BREATH: ICD-10-CM

## 2019-08-20 DIAGNOSIS — R06.02 SHORTNESS OF BREATH ON EXERTION: ICD-10-CM

## 2019-08-20 PROCEDURE — 71250 CT THORAX DX C-: CPT

## 2019-09-10 ENCOUNTER — OFFICE VISIT (OUTPATIENT)
Dept: URGENT CARE | Facility: PHYSICIAN GROUP | Age: 49
End: 2019-09-10
Payer: COMMERCIAL

## 2019-09-10 VITALS
OXYGEN SATURATION: 96 % | RESPIRATION RATE: 16 BRPM | HEIGHT: 69 IN | TEMPERATURE: 99 F | WEIGHT: 270 LBS | DIASTOLIC BLOOD PRESSURE: 78 MMHG | BODY MASS INDEX: 39.99 KG/M2 | SYSTOLIC BLOOD PRESSURE: 124 MMHG | HEART RATE: 74 BPM

## 2019-09-10 DIAGNOSIS — M54.2 ACUTE NECK PAIN: ICD-10-CM

## 2019-09-10 PROCEDURE — 99214 OFFICE O/P EST MOD 30 MIN: CPT | Performed by: NURSE PRACTITIONER

## 2019-09-10 RX ORDER — IBUPROFEN 800 MG/1
800 TABLET ORAL EVERY 8 HOURS PRN
Qty: 90 TAB | Refills: 0 | Status: SHIPPED | OUTPATIENT
Start: 2019-09-10 | End: 2020-08-05

## 2019-09-10 RX ORDER — KETOROLAC TROMETHAMINE 30 MG/ML
30 INJECTION, SOLUTION INTRAMUSCULAR; INTRAVENOUS ONCE
Status: COMPLETED | OUTPATIENT
Start: 2019-09-10 | End: 2019-09-10

## 2019-09-10 RX ORDER — CYCLOBENZAPRINE HCL 10 MG
10 TABLET ORAL
Qty: 20 TAB | Refills: 0 | Status: SHIPPED | OUTPATIENT
Start: 2019-09-10 | End: 2020-08-05

## 2019-09-10 RX ORDER — KETOROLAC TROMETHAMINE 30 MG/ML
30 INJECTION, SOLUTION INTRAMUSCULAR; INTRAVENOUS ONCE
Status: DISCONTINUED | OUTPATIENT
Start: 2019-09-10 | End: 2019-09-10

## 2019-09-10 RX ADMIN — KETOROLAC TROMETHAMINE 30 MG: 30 INJECTION, SOLUTION INTRAMUSCULAR; INTRAVENOUS at 09:43

## 2019-09-10 ASSESSMENT — ENCOUNTER SYMPTOMS
FOCAL WEAKNESS: 0
TINGLING: 0
CHILLS: 0
SENSORY CHANGE: 0
HEADACHES: 1
DOUBLE VISION: 0
BLURRED VISION: 0
NECK PAIN: 1
FEVER: 0
NAUSEA: 0

## 2019-09-10 NOTE — PROGRESS NOTES
"Subjective:      Sudha Malik is a 48 y.o. male who presents with Neck Pain (back of neck down to shoulders hurt ) and Shoulder Pain            HPI New. 48 year old male with neck and head pain for approx one week following intercourse. Denies pain being \"thunderclap\" in nature. He describes tightness and pain in posterior scalp/neck area. States feels tense. Denies nausea or vomiting, visual changes or any other trauma to his neck. He has taken a solitary tylenol only for treatment. No icing or heat being done.  Patient has no known allergies.  Current Outpatient Medications on File Prior to Visit   Medication Sig Dispense Refill   • acetaminophen (TYLENOL) 325 MG Tab Take 650 mg by mouth every 6 hours as needed for Mild Pain.       No current facility-administered medications on file prior to visit.      Social History     Socioeconomic History   • Marital status:      Spouse name: Not on file   • Number of children: Not on file   • Years of education: Not on file   • Highest education level: Not on file   Occupational History   • Not on file   Social Needs   • Financial resource strain: Not on file   • Food insecurity:     Worry: Not on file     Inability: Not on file   • Transportation needs:     Medical: Not on file     Non-medical: Not on file   Tobacco Use   • Smoking status: Never Smoker   • Smokeless tobacco: Never Used   Substance and Sexual Activity   • Alcohol use: Yes     Comment: occasional;   • Drug use: No   • Sexual activity: Yes   Lifestyle   • Physical activity:     Days per week: Not on file     Minutes per session: Not on file   • Stress: Not on file   Relationships   • Social connections:     Talks on phone: Not on file     Gets together: Not on file     Attends Hinduism service: Not on file     Active member of club or organization: Not on file     Attends meetings of clubs or organizations: Not on file     Relationship status: Not on file   • Intimate partner violence:     Fear of " "current or ex partner: Not on file     Emotionally abused: Not on file     Physically abused: Not on file     Forced sexual activity: Not on file   Other Topics Concern   • Not on file   Social History Narrative   • Not on file     Breast Cancer-related family history is not on file.      Review of Systems   Constitutional: Negative for chills and fever.   Eyes: Negative for blurred vision and double vision.   Gastrointestinal: Negative for nausea.   Musculoskeletal: Positive for neck pain.   Neurological: Positive for headaches. Negative for tingling, sensory change and focal weakness.          Objective:     /78   Pulse 74   Temp 37.2 °C (99 °F)   Resp 16   Ht 1.753 m (5' 9\")   Wt 122.5 kg (270 lb)   SpO2 96%   BMI 39.87 kg/m²      Physical Exam   Constitutional: He is oriented to person, place, and time. He appears well-developed and well-nourished. No distress.   HENT:   Head: Normocephalic and atraumatic.   Right Ear: External ear and ear canal normal. Tympanic membrane is not injected and not perforated. No middle ear effusion.   Left Ear: External ear and ear canal normal. Tympanic membrane is not injected and not perforated.  No middle ear effusion.   Nose: Mucosal edema present.   Mouth/Throat: Posterior oropharyngeal erythema present. No oropharyngeal exudate.   Eyes: Conjunctivae are normal. Right eye exhibits no discharge. Left eye exhibits no discharge.   Neck: Neck supple. Muscular tenderness present. Decreased range of motion present.       Cardiovascular: Normal rate, regular rhythm and normal heart sounds.   No murmur heard.  Pulmonary/Chest: Effort normal and breath sounds normal. No respiratory distress.   Musculoskeletal:   Normal movement of all 4 extremities.   Lymphadenopathy:     He has no cervical adenopathy.        Right: No supraclavicular adenopathy present.        Left: No supraclavicular adenopathy present.   Neurological: He is alert and oriented to person, place, and " time. Gait normal.   Skin: Skin is warm and dry.   Psychiatric: He has a normal mood and affect. His behavior is normal. Thought content normal.   Nursing note and vitals reviewed.              Assessment/Plan:     1. Acute neck pain  ibuprofen (MOTRIN) 800 MG Tab    cyclobenzaprine (FLEXERIL) 10 MG Tab    ketorolac (TORADOL) injection 30 mg    DISCONTINUED: ketorolac (TORADOL) injection 30 mg     Headache could be referred pain.   Advised that if he experiences another episode of post coital headache then immediate follow up with us or pcp.  Ibuprofen and flexeril (reviewed sedation precautions)  Gentle stretching. Ice/heat.

## 2019-09-10 NOTE — LETTER
September 10, 2019        Rangel Malik  [address] [address]        Mr. Malik was seen in our clinic today and he is excused from work. Thank you.  If you have any questions or concerns, please don't hesitate to call.        Sincerely,        FREDDY Augustine.P.BRUNO.    Electronically Signed

## 2019-12-19 ENCOUNTER — OFFICE VISIT (OUTPATIENT)
Dept: URGENT CARE | Facility: PHYSICIAN GROUP | Age: 49
End: 2019-12-19
Payer: COMMERCIAL

## 2019-12-19 VITALS
SYSTOLIC BLOOD PRESSURE: 118 MMHG | BODY MASS INDEX: 41.26 KG/M2 | RESPIRATION RATE: 16 BRPM | HEART RATE: 80 BPM | TEMPERATURE: 99 F | WEIGHT: 278.6 LBS | HEIGHT: 69 IN | OXYGEN SATURATION: 93 % | DIASTOLIC BLOOD PRESSURE: 80 MMHG

## 2019-12-19 DIAGNOSIS — M1A.09X0 IDIOPATHIC CHRONIC GOUT OF MULTIPLE SITES WITHOUT TOPHUS: ICD-10-CM

## 2019-12-19 DIAGNOSIS — M79.10 MYALGIA: ICD-10-CM

## 2019-12-19 DIAGNOSIS — R53.83 OTHER FATIGUE: ICD-10-CM

## 2019-12-19 DIAGNOSIS — R06.83 SNORING: ICD-10-CM

## 2019-12-19 PROCEDURE — 99214 OFFICE O/P EST MOD 30 MIN: CPT | Performed by: FAMILY MEDICINE

## 2019-12-19 RX ORDER — MELOXICAM 15 MG/1
15 TABLET ORAL DAILY
Qty: 30 TAB | Refills: 0 | Status: SHIPPED | OUTPATIENT
Start: 2019-12-19 | End: 2020-08-05

## 2019-12-20 NOTE — PROGRESS NOTES
"Chief Complaint   Patient presents with   • Back Pain     headache, both arms and legs x 3 weeks, pain middle to lower back pain x1.5 week, took some tylenol for pain, always tired         HPI:        Pt c/o 2 wk hx of feeling unusually fatigued.    He states that he typically has poor sleep, with multiple nighttime awakenings, but notes even more difficult time sleeping now.    He wakes up feeling tired no matter how long he has been in bed.   + snoring.   Denies feeling depressed.   Denies anxiety or panic or any ususual stressors.          Also c/o diffuse muscle and joint \"sorness\", particularly in upper arms, legs, and low and mid back x 1 wk.    Denies any injury or accident or trauma.    No fevers.   Denies cough, congestion.   Denies n/v/d.  No abd pain.         Past Medical History:   Diagnosis Date   • Low back pain 9/26/2011   prediabetes  Gout        Social History     Tobacco Use   • Smoking status: Never Smoker   • Smokeless tobacco: Never Used   Substance Use Topics   • Alcohol use: Yes     Comment: occasional;   • Drug use: No       Family history was reviewed and not pertinent           Review of Systems   Constitutional: Negative for fever, chills .  + malaise/fatigue.   Eyes: Negative for vision changes, d/c.    HENT - positive snoring.  Respiratory: Negative for cough and sputum production.    Cardiovascular: Negative for chest pain and palpitations.   Gastrointestinal: Negative for nausea, vomiting, abdominal pain, diarrhea and constipation.   Genitourinary: Negative for dysuria, urgency and frequency.   Skin: Negative for rash or  itching.   Neurological: Negative for dizziness and tingling.   Psychiatric/Behavioral: Negative for depression.   Hematologic/lymphatic - denies bruising or excessive bleeding  All other systems reviewed and are negative.      OBJECTIVE  /80 (BP Location: Right arm, Patient Position: Sitting, BP Cuff Size: Adult long)   Pulse 80   Temp 37.2 °C (99 °F) " "(Temporal)   Resp 16   Ht 1.753 m (5' 9\")   Wt (!) 126.4 kg (278 lb 9.6 oz)   SpO2 93%   HEENT - PERRLA, EOMI  Neuro - alert and oriented x3. CN 2-12 grossly intact.  Lungs - CTA. No wheezes, rhonchi or rales.  Heart - regular rate and rhythm without murmur.  Abdomen - soft and non-tender, bowel sounds active x4.  Musculoskeletal - No lower extremity edema noted.  Waldemar's sign negative bilaterally.   There is no joint swelling or redness.         A/P:      1. Snoring  C/w sleep apnea  - REFERRAL TO SLEEP STUDIES    2.  fatigue  3. Myalgia       unclear etiolgoy     Labs ordered    Advised f/u with PCP    - CBC WITH DIFFERENTIAL; Future    - CBC WITH DIFFERENTIAL; Future  - Comp Metabolic Panel; Future  - WESTERGREN SED RATE; Future  - CRP QUANTITIVE (NON-CARDIAC); Future  - JASON TITER  - HEMOGLOBIN A1C; Future  - URIC A+JASON+RA QN+CRP+ASO    Will start on daily mobic for the polyarthralgia.   - meloxicam (MOBIC) 15 MG tablet; Take 1 Tab by mouth every day.  Dispense: 30 Tab; Refill: 0      "

## 2020-04-30 ENCOUNTER — OFFICE VISIT (OUTPATIENT)
Dept: URGENT CARE | Facility: PHYSICIAN GROUP | Age: 50
End: 2020-04-30
Payer: COMMERCIAL

## 2020-04-30 VITALS
WEIGHT: 255 LBS | DIASTOLIC BLOOD PRESSURE: 60 MMHG | HEART RATE: 77 BPM | SYSTOLIC BLOOD PRESSURE: 110 MMHG | BODY MASS INDEX: 37.77 KG/M2 | TEMPERATURE: 98.3 F | RESPIRATION RATE: 18 BRPM | HEIGHT: 69 IN | OXYGEN SATURATION: 93 %

## 2020-04-30 DIAGNOSIS — R42 DIZZINESS: ICD-10-CM

## 2020-04-30 DIAGNOSIS — H72.91 PERFORATED EAR DRUM, RIGHT: ICD-10-CM

## 2020-04-30 DIAGNOSIS — H65.01 NON-RECURRENT ACUTE SEROUS OTITIS MEDIA OF RIGHT EAR: ICD-10-CM

## 2020-04-30 LAB
APPEARANCE UR: NORMAL
BILIRUB UR STRIP-MCNC: NEGATIVE MG/DL
COLOR UR AUTO: NORMAL
GLUCOSE BLD-MCNC: 89 MG/DL (ref 70–100)
GLUCOSE UR STRIP.AUTO-MCNC: NEGATIVE MG/DL
KETONES UR STRIP.AUTO-MCNC: NORMAL MG/DL
LEUKOCYTE ESTERASE UR QL STRIP.AUTO: NEGATIVE
NITRITE UR QL STRIP.AUTO: NEGATIVE
PH UR STRIP.AUTO: 5.5 [PH] (ref 5–8)
PROT UR QL STRIP: NORMAL MG/DL
RBC UR QL AUTO: NORMAL
SP GR UR STRIP.AUTO: 1.03
UROBILINOGEN UR STRIP-MCNC: 0.2 MG/DL

## 2020-04-30 PROCEDURE — 82962 GLUCOSE BLOOD TEST: CPT | Performed by: PHYSICIAN ASSISTANT

## 2020-04-30 PROCEDURE — 99214 OFFICE O/P EST MOD 30 MIN: CPT | Performed by: PHYSICIAN ASSISTANT

## 2020-04-30 PROCEDURE — 81002 URINALYSIS NONAUTO W/O SCOPE: CPT | Performed by: PHYSICIAN ASSISTANT

## 2020-04-30 RX ORDER — AMOXICILLIN 875 MG/1
875 TABLET, COATED ORAL 2 TIMES DAILY
Qty: 20 TAB | Refills: 0 | Status: SHIPPED | OUTPATIENT
Start: 2020-04-30 | End: 2020-05-10

## 2020-04-30 ASSESSMENT — ENCOUNTER SYMPTOMS
FEVER: 0
MYALGIAS: 0
DIZZINESS: 1
PALPITATIONS: 0
BLOOD IN STOOL: 0
HEADACHES: 1
NAUSEA: 0
DIARRHEA: 1
SHORTNESS OF BREATH: 0
ABDOMINAL PAIN: 1
SENSORY CHANGE: 0
VOMITING: 0
HEARTBURN: 0
BLURRED VISION: 0
DOUBLE VISION: 0
FOCAL WEAKNESS: 0
COUGH: 0
CONSTIPATION: 0
CHILLS: 0

## 2020-04-30 ASSESSMENT — FIBROSIS 4 INDEX: FIB4 SCORE: 1.02

## 2020-04-30 NOTE — LETTER
April 30, 2020         Patient: Rangel Malik   YOB: 1970   Date of Visit: 4/30/2020           To Whom it May Concern:    Rangel Malik was seen in my clinic on 4/30/2020. Please excuse him from work today and tomorrow. He may return to work on 5/4/20.    If you have any questions or concerns, please don't hesitate to call.        Sincerely,           Obed Calvo P.A.-C.  Electronically Signed

## 2020-04-30 NOTE — PROGRESS NOTES
Subjective:   Rangel Malik is a 49 y.o. male who presents today with   Chief Complaint   Patient presents with   • Dizziness     dizzy, diarrhea, abdominal pain ( cramps), headache x4 days        Dizziness   This is a new problem. Episode onset: 4 days. The problem occurs constantly. The problem has been unchanged. Associated symptoms include abdominal pain and headaches. Pertinent negatives include no chest pain, chills, congestion, coughing, fever, myalgias, nausea or vomiting. Nothing aggravates the symptoms. He has tried nothing for the symptoms. The treatment provided no relief.     Patient states he has been having some dizziness for 4 days and also has had diarrhea.  Patient denies any fevers or chills.  He states that he has some mild abdominal cramps. Denies situational dizziness from seated to standing. Occasional abdominal cramping, none today upon visit, no nausea.  PMH:  has a past medical history of Low back pain (9/26/2011).  MEDS:   Current Outpatient Medications:   •  amoxicillin (AMOXIL) 875 MG tablet, Take 1 Tab by mouth 2 times a day for 10 days., Disp: 20 Tab, Rfl: 0  •  acetaminophen (TYLENOL) 325 MG Tab, Take 650 mg by mouth every 6 hours as needed for Mild Pain., Disp: , Rfl:   •  meloxicam (MOBIC) 15 MG tablet, Take 1 Tab by mouth every day. (Patient not taking: Reported on 4/30/2020), Disp: 30 Tab, Rfl: 0  •  ibuprofen (MOTRIN) 800 MG Tab, Take 1 Tab by mouth every 8 hours as needed. (Patient not taking: Reported on 4/30/2020), Disp: 90 Tab, Rfl: 0  •  cyclobenzaprine (FLEXERIL) 10 MG Tab, Take 1 Tab by mouth every bedtime. (Patient not taking: Reported on 12/19/2019), Disp: 20 Tab, Rfl: 0  ALLERGIES: No Known Allergies  SURGHX:   Past Surgical History:   Procedure Laterality Date   • VASECTOMY       SOCHX:  reports that he has never smoked. He has never used smokeless tobacco. He reports current alcohol use. He reports that he does not use drugs.  FH: Reviewed with patient, not  "pertinent to this visit.       Review of Systems   Constitutional: Negative for chills, fever and malaise/fatigue.   HENT: Positive for ear discharge, ear pain and hearing loss. Negative for congestion and tinnitus.    Eyes: Negative for blurred vision and double vision.   Respiratory: Negative for cough and shortness of breath.    Cardiovascular: Negative for chest pain and palpitations.   Gastrointestinal: Positive for abdominal pain and diarrhea. Negative for blood in stool, constipation, heartburn, melena, nausea and vomiting.   Genitourinary: Negative for dysuria and urgency.   Musculoskeletal: Negative for myalgias.   Neurological: Positive for dizziness and headaches. Negative for sensory change and focal weakness.   All other systems reviewed and are negative.       Objective:   /60 (BP Location: Left arm, Patient Position: Sitting, BP Cuff Size: Large adult)   Pulse 77   Temp 36.8 °C (98.3 °F) (Temporal)   Resp 18   Ht 1.753 m (5' 9\")   Wt 115.7 kg (255 lb)   SpO2 93%   BMI 37.66 kg/m²   Physical Exam  Vitals signs and nursing note reviewed.   Constitutional:       General: He is not in acute distress.     Appearance: Normal appearance. He is well-developed and normal weight. He is not ill-appearing or toxic-appearing.   HENT:      Head: Normocephalic and atraumatic.      Right Ear: Decreased hearing noted. A middle ear effusion is present. Tympanic membrane is perforated and erythematous.      Left Ear: Hearing, tympanic membrane and ear canal normal.   Eyes:      Pupils: Pupils are equal, round, and reactive to light.   Cardiovascular:      Rate and Rhythm: Normal rate and regular rhythm.      Heart sounds: Normal heart sounds.   Pulmonary:      Effort: Pulmonary effort is normal.   Abdominal:      General: Abdomen is flat. There is no distension.      Palpations: There is no mass.      Tenderness: There is abdominal tenderness. There is no right CVA tenderness, left CVA tenderness, guarding " or rebound.      Hernia: No hernia is present.   Musculoskeletal:      Comments: Normal movement in all 4 extremities   Skin:     General: Skin is warm and dry.   Neurological:      General: No focal deficit present.      Mental Status: He is alert.      GCS: GCS eye subscore is 4. GCS verbal subscore is 5. GCS motor subscore is 6.      Cranial Nerves: Cranial nerves are intact. No cranial nerve deficit.      Sensory: Sensation is intact.      Motor: Motor function is intact.      Coordination: Coordination normal.   Psychiatric:         Mood and Affect: Mood normal.         UA Neg for infection  Blood Glucose 89  Assessment/Plan:   Assessment    1. Dizziness  - POCT Glucose  - POCT Urinalysis    2. Perforated ear drum, right  - amoxicillin (AMOXIL) 875 MG tablet; Take 1 Tab by mouth 2 times a day for 10 days.  Dispense: 20 Tab; Refill: 0  - REFERRAL TO ENT    3. Non-recurrent acute serous otitis media of right ear  Encourage patient to drink more water as his diarrhea is likely causing him to be dehydrated.  Patient will follow-up with ENT as needed.  Encouraged him to use cotton swab in his ear and avoid any foreign bodies in the ear.  Dizziness likely secondary to ear issues discovered on visit today.   Differential diagnosis, natural history, supportive care, and indications for immediate follow-up discussed.   Patient given instructions and understanding of medications and treatment.    If not improving in 3-5 days, F/U with PCP or return to UC if symptoms worsen.    Patient agreeable to plan.      Please note that this dictation was created using voice recognition software. I have made every reasonable attempt to correct obvious errors, but I expect that there are errors of grammar and possibly content that I did not discover before finalizing the note.    Obed Calvo PA-C

## 2020-06-18 ENCOUNTER — OFFICE VISIT (OUTPATIENT)
Dept: URGENT CARE | Facility: PHYSICIAN GROUP | Age: 50
End: 2020-06-18
Payer: COMMERCIAL

## 2020-06-18 VITALS
WEIGHT: 270 LBS | OXYGEN SATURATION: 97 % | SYSTOLIC BLOOD PRESSURE: 122 MMHG | DIASTOLIC BLOOD PRESSURE: 74 MMHG | RESPIRATION RATE: 18 BRPM | HEIGHT: 69 IN | HEART RATE: 86 BPM | BODY MASS INDEX: 39.99 KG/M2 | TEMPERATURE: 97 F

## 2020-06-18 DIAGNOSIS — M54.50 ACUTE BILATERAL LOW BACK PAIN WITHOUT SCIATICA: ICD-10-CM

## 2020-06-18 PROCEDURE — 99214 OFFICE O/P EST MOD 30 MIN: CPT | Performed by: PHYSICIAN ASSISTANT

## 2020-06-18 RX ORDER — CYCLOBENZAPRINE HCL 5 MG
5-10 TABLET ORAL
Qty: 15 TAB | Refills: 0 | Status: SHIPPED | OUTPATIENT
Start: 2020-06-18 | End: 2020-09-17

## 2020-06-18 RX ORDER — KETOROLAC TROMETHAMINE 30 MG/ML
60 INJECTION, SOLUTION INTRAMUSCULAR; INTRAVENOUS ONCE
Status: COMPLETED | OUTPATIENT
Start: 2020-06-18 | End: 2020-06-18

## 2020-06-18 RX ADMIN — KETOROLAC TROMETHAMINE 60 MG: 30 INJECTION, SOLUTION INTRAMUSCULAR; INTRAVENOUS at 12:22

## 2020-06-18 ASSESSMENT — ENCOUNTER SYMPTOMS
SENSORY CHANGE: 0
MYALGIAS: 0
HEADACHES: 0
ABDOMINAL PAIN: 0
FEVER: 0
TINGLING: 0
NECK PAIN: 0
SHORTNESS OF BREATH: 0
FOCAL WEAKNESS: 0
FLANK PAIN: 0
FALLS: 0
WEAKNESS: 0
DIARRHEA: 0
CONSTIPATION: 0
VOMITING: 0
CHILLS: 0
BACK PAIN: 1
DIZZINESS: 0
NAUSEA: 0

## 2020-06-18 ASSESSMENT — FIBROSIS 4 INDEX: FIB4 SCORE: 1.02

## 2020-06-18 NOTE — LETTER
June 18, 2020         Patient: Rangel Malik   YOB: 1970   Date of Visit: 6/18/2020           To Whom it May Concern:    Rangel Malik was seen in my clinic on 6/18/2020. Please excuse him from work today.    If you have any questions or concerns, please don't hesitate to call.        Sincerely,           Tia Cadena P.A.-C.  Electronically Signed

## 2020-06-18 NOTE — PROGRESS NOTES
"Subjective:      Sudha Malik is a 49 y.o. male who presents with Back Pain (lower back pain, poss pinched nerve, constantly painful, cant bend down to far, x1 day )            HPI  49-year-old male presents urgent care with new problem of low back pain onset yesterday while working around the house. He denies specific injury. Pain intermittent radiates down RLE.   Denies lower extremity numbness/weakness, saddle anesthesias, or bowel/bladder incontinence.  Denies history of chronic low back pain.  He does report history of previous low back injuries.   Tylenol with some relief. Denies other associated aggravating or alleviating factors.     Review of Systems   Constitutional: Negative for chills and fever.   Respiratory: Negative for shortness of breath.    Cardiovascular: Negative for chest pain.   Gastrointestinal: Negative for abdominal pain, constipation, diarrhea, nausea and vomiting.   Genitourinary: Negative for flank pain and hematuria.   Musculoskeletal: Positive for back pain. Negative for falls, myalgias and neck pain.   Neurological: Negative for dizziness, tingling, sensory change, focal weakness, weakness and headaches.   All other systems reviewed and are negative.      Past Medical History:   Diagnosis Date   • Low back pain 9/26/2011     Medications and allergies reviewed in [email or web address].  Social History     Tobacco Use   • Smoking status: Never Smoker   • Smokeless tobacco: Never Used   Substance Use Topics   • Alcohol use: Yes     Comment: occasional;      Objective:     /74 (BP Location: Right arm, Patient Position: Sitting, BP Cuff Size: Large adult)   Pulse 86   Temp 36.1 °C (97 °F) (Temporal)   Resp 18   Ht 1.753 m (5' 9\")   Wt 122.5 kg (270 lb)   SpO2 97%   BMI 39.87 kg/m²      Physical Exam  Vitals signs reviewed.   Constitutional:       General: He is not in acute distress.     Appearance: Normal appearance. He is well-developed.   HENT:      Head: Normocephalic and atraumatic.   "   Eyes:      Conjunctiva/sclera: Conjunctivae normal.   Neck:      Musculoskeletal: Normal range of motion and neck supple.   Cardiovascular:      Rate and Rhythm: Normal rate and regular rhythm.      Heart sounds: Normal heart sounds.   Pulmonary:      Effort: Pulmonary effort is normal. No respiratory distress.      Breath sounds: Normal breath sounds.   Abdominal:      Palpations: Abdomen is soft.      Tenderness: There is no abdominal tenderness.   Musculoskeletal:        Back:    Skin:     General: Skin is warm and dry.   Neurological:      General: No focal deficit present.      Mental Status: He is alert and oriented to person, place, and time.      Cranial Nerves: No cranial nerve deficit.      Sensory: No sensory deficit.      Motor: No weakness.      Gait: Gait normal.   Psychiatric:         Mood and Affect: Mood normal.         Behavior: Behavior normal.         Thought Content: Thought content normal.         Judgment: Judgment normal.                 Assessment/Plan:     1. Acute bilateral low back pain without sciatica  ketorolac (TORADOL) injection 60 mg    cyclobenzaprine (FLEXERIL) 5 MG tablet     Patient given 60 mg IM injection of Toradol in clinic with good relief.  Denies history of chronic kidney disease.  Patient advised to apply heat to low back and perform gentle stretching and range of motion exercises as tolerated with pain.  Patient given strict return and ED precautions for worsening of symptoms including development of numbness/weakness of lower extremities, difficulty ambulating, saddle anesthesia, or bowel/bladder incontinence.  Patient verbalized understanding of treatment plan and has no further questions regarding care.

## 2020-08-05 ENCOUNTER — HOSPITAL ENCOUNTER (OUTPATIENT)
Facility: MEDICAL CENTER | Age: 50
End: 2020-08-05
Attending: NURSE PRACTITIONER
Payer: COMMERCIAL

## 2020-08-05 ENCOUNTER — OFFICE VISIT (OUTPATIENT)
Dept: URGENT CARE | Facility: PHYSICIAN GROUP | Age: 50
End: 2020-08-05
Payer: COMMERCIAL

## 2020-08-05 VITALS
HEART RATE: 74 BPM | DIASTOLIC BLOOD PRESSURE: 78 MMHG | HEIGHT: 69 IN | RESPIRATION RATE: 20 BRPM | WEIGHT: 276.6 LBS | SYSTOLIC BLOOD PRESSURE: 118 MMHG | OXYGEN SATURATION: 97 % | TEMPERATURE: 97.6 F | BODY MASS INDEX: 40.97 KG/M2

## 2020-08-05 DIAGNOSIS — R53.81 MALAISE AND FATIGUE: ICD-10-CM

## 2020-08-05 DIAGNOSIS — R19.7 DIARRHEA, UNSPECIFIED TYPE: ICD-10-CM

## 2020-08-05 DIAGNOSIS — F40.298 FEAR OF DEATH: ICD-10-CM

## 2020-08-05 DIAGNOSIS — R53.83 MALAISE AND FATIGUE: ICD-10-CM

## 2020-08-05 LAB — COVID ORDER STATUS COVID19: NORMAL

## 2020-08-05 PROCEDURE — U0003 INFECTIOUS AGENT DETECTION BY NUCLEIC ACID (DNA OR RNA); SEVERE ACUTE RESPIRATORY SYNDROME CORONAVIRUS 2 (SARS-COV-2) (CORONAVIRUS DISEASE [COVID-19]), AMPLIFIED PROBE TECHNIQUE, MAKING USE OF HIGH THROUGHPUT TECHNOLOGIES AS DESCRIBED BY CMS-2020-01-R: HCPCS

## 2020-08-05 PROCEDURE — 99214 OFFICE O/P EST MOD 30 MIN: CPT | Performed by: NURSE PRACTITIONER

## 2020-08-05 ASSESSMENT — ENCOUNTER SYMPTOMS
PALPITATIONS: 0
DIZZINESS: 0
FEVER: 0
WHEEZING: 0
SHORTNESS OF BREATH: 0
CHILLS: 0
CONSTIPATION: 0
DIARRHEA: 1
ABDOMINAL PAIN: 1
BLOOD IN STOOL: 0
COUGH: 0
HEADACHES: 1
VOMITING: 0
NAUSEA: 0
MYALGIAS: 1

## 2020-08-05 ASSESSMENT — FIBROSIS 4 INDEX: FIB4 SCORE: 1.02

## 2020-08-05 NOTE — PROGRESS NOTES
Subjective:   Rangel Malik  is a 49 y.o. male who presents for No chief complaint on file.        49-year-old male patient reports urgent care for new problem that started 4 days ago.  Patient states he had ongoing watery diarrhea and a small stomachache and feeling very tired and lethargic.  Patient is taken over-the-counter Pepto-Bismol for mild relief of symptoms.  Is unaware if he came into contact with any one positive for the COVID virus.  Would like to be tested if possible.  Patient is having a headache and neck pain but that is not out of the ordinary for him.  Denies shortness of breath, wheezing, difficulty breathing, change in taste or smell, vomiting, fever or chills.  Patient states he is having a hard time sleeping that is getting worse.  Has not taken anything over-the-counter to help with sleep but states that he lays awake at night for 2 hours due to thinking about death.  Patient states he has not ever been on medication for depression or seen anybody for therapy but is interested in this.    Review of Systems   Constitutional: Positive for malaise/fatigue. Negative for chills and fever.   Respiratory: Negative for cough, shortness of breath and wheezing.    Cardiovascular: Negative for chest pain and palpitations.   Gastrointestinal: Positive for abdominal pain and diarrhea. Negative for blood in stool, constipation, nausea and vomiting.   Musculoskeletal: Positive for myalgias. Negative for joint pain.   Neurological: Positive for headaches. Negative for dizziness.     Past Medical History:   Diagnosis Date   • Low back pain 9/26/2011      Past Surgical History:   Procedure Laterality Date   • VASECTOMY        Social History     Socioeconomic History   • Marital status:      Spouse name: Not on file   • Number of children: Not on file   • Years of education: Not on file   • Highest education level: Not on file   Occupational History   • Not on file   Social Needs   • Financial  resource strain: Not on file   • Food insecurity     Worry: Not on file     Inability: Not on file   • Transportation needs     Medical: Not on file     Non-medical: Not on file   Tobacco Use   • Smoking status: Never Smoker   • Smokeless tobacco: Never Used   Substance and Sexual Activity   • Alcohol use: Yes     Comment: occasional;   • Drug use: No   • Sexual activity: Yes   Lifestyle   • Physical activity     Days per week: Not on file     Minutes per session: Not on file   • Stress: Not on file   Relationships   • Social connections     Talks on phone: Not on file     Gets together: Not on file     Attends [employer] service: Not on file     Active member of club or organization: Not on file     Attends meetings of clubs or organizations: Not on file     Relationship status: Not on file   • Intimate partner violence     Fear of current or ex partner: Not on file     Emotionally abused: Not on file     Physically abused: Not on file     Forced sexual activity: Not on file   Other Topics Concern   • Not on file   Social History Narrative   • Not on file    Patient has no known allergies.       Objective:   There were no vitals taken for this visit.  Physical Exam  Vitals signs reviewed.   Constitutional:       Appearance: Normal appearance.   HENT:      Right Ear: Tympanic membrane, ear canal and external ear normal.      Left Ear: Tympanic membrane, ear canal and external ear normal.      Mouth/Throat:      Lips: Pink.      Pharynx: Uvula midline.   Cardiovascular:      Rate and Rhythm: Normal rate and regular rhythm.      Heart sounds: Normal heart sounds.   Pulmonary:      Effort: Pulmonary effort is normal.      Breath sounds: Normal breath sounds.   Abdominal:      General: Abdomen is flat. Bowel sounds are normal.      Palpations: Abdomen is soft.   Lymphadenopathy:      Cervical: Cervical adenopathy present.   Skin:     General: Skin is warm.   Neurological:      Mental Status: He is alert and oriented to  person, place, and time.   Psychiatric:         Mood and Affect: Mood normal.         Behavior: Behavior normal.         Thought Content: Thought content normal.         Judgment: Judgment normal.           Assessment/Plan:      1. Diarrhea, unspecified type  - COVID/SARS COV-2 PCR; Future    2. Malaise and fatigue  - COVID/SARS COV-2 PCR; Future    3. Fear of death  - REFERRAL TO BEHAVIORAL HEALTH    Discussed Physical examination findings with patient are likely viral in etiology and could be due to COVID so will perform testing. Advised patient to remain in self isolation until cleared by a negative test or the health department, if they test positive. Will call patient with results. Strict ER precautions provided for worsening symptoms including SOB, difficulty breathing or high fever unable to be controlled by OTC tylenol or NSAIDS. Viral illness are largely self limiting and patient should increase fluids using electrolyte enriched beverages as well as OTC medications such as tylenol and ibuprofen per 's instructions.     Discussed difficulty sleeping due to irrational fear of death is concerning and will provide patient with a referral to behavioral health for further work-up and evaluation.  Suggested he take over-the-counter melatonin in the meantime to assist with sleep.    Supportive care, differential diagnoses, and indications for immediate follow-up discussed with patient.    Pathogenesis of diagnosis discussed including typical length and natural progression. Patient expresses understanding and agrees to plan.    Instructed patient to return to clinic for worsening symptoms or symptoms that persist for 7 to 10 days     Please note that this dictation was created using voice recognition software. I have made every reasonable attempt to correct obvious errors, but I expect that there are errors of grammar and possibly content that I did not discover before finalizing the note.

## 2020-08-06 LAB
SARS-COV-2 RNA RESP QL NAA+PROBE: NOTDETECTED
SPECIMEN SOURCE: NORMAL

## 2020-09-17 ENCOUNTER — OFFICE VISIT (OUTPATIENT)
Dept: URGENT CARE | Facility: PHYSICIAN GROUP | Age: 50
End: 2020-09-17
Payer: COMMERCIAL

## 2020-09-17 VITALS
SYSTOLIC BLOOD PRESSURE: 110 MMHG | OXYGEN SATURATION: 97 % | RESPIRATION RATE: 16 BRPM | TEMPERATURE: 97.8 F | HEART RATE: 92 BPM | HEIGHT: 69 IN | WEIGHT: 277 LBS | DIASTOLIC BLOOD PRESSURE: 80 MMHG | BODY MASS INDEX: 41.03 KG/M2

## 2020-09-17 DIAGNOSIS — M62.830 SPASM OF BACK MUSCLES: ICD-10-CM

## 2020-09-17 DIAGNOSIS — R05.9 COUGH: ICD-10-CM

## 2020-09-17 PROCEDURE — 99214 OFFICE O/P EST MOD 30 MIN: CPT | Performed by: NURSE PRACTITIONER

## 2020-09-17 RX ORDER — BENZONATATE 100 MG/1
100 CAPSULE ORAL 3 TIMES DAILY PRN
Qty: 60 CAP | Refills: 0 | Status: SHIPPED | OUTPATIENT
Start: 2020-09-17 | End: 2020-09-28

## 2020-09-17 RX ORDER — BACLOFEN 10 MG/1
10 TABLET ORAL 3 TIMES DAILY
Qty: 45 TAB | Refills: 0 | Status: SHIPPED | OUTPATIENT
Start: 2020-09-17 | End: 2020-09-21

## 2020-09-17 ASSESSMENT — ENCOUNTER SYMPTOMS
WHEEZING: 0
DIARRHEA: 0
WEAKNESS: 0
NAUSEA: 0
ABDOMINAL PAIN: 0
FEVER: 0
MYALGIAS: 0
ORTHOPNEA: 0
HEADACHES: 0
SPUTUM PRODUCTION: 0
COUGH: 1
VOMITING: 0
DIZZINESS: 0
BACK PAIN: 1
PALPITATIONS: 0
SORE THROAT: 0
FOCAL WEAKNESS: 0
CHILLS: 0
TINGLING: 0
SHORTNESS OF BREATH: 0

## 2020-09-17 ASSESSMENT — FIBROSIS 4 INDEX: FIB4 SCORE: 1.02

## 2020-09-17 NOTE — PROGRESS NOTES
Subjective:   Rangel Malik  is a 49 y.o. male who presents for Back Pain (pain when moving, tense when he sits for too long, x1 day )        HPI   49-year-old male patient reports urgent care for back pain that started yesterday.  Patient states that he believes that the back pain started due to ongoing coughing due to the irritation from smoke.  Is not taken anything over-the-counter for cough but states that he does have previous injury of his back and is wondering if the pain is exacerbated. Denies any mechanism of injury.  Feels like he is tight and having spasm along the entire spinal column.  Denies any numbness or tingling anywhere denies any radiating pain into his hips, thighs or knees.  Denies any saddle anesthesia.  Patient denies any shortness of breath, wheezing, difficulty breathing, nausea, vomiting, diarrhea, headache or change in taste or smell.  Denies history of seasonal allergies. Also needs referral for new PCP due to his established one leaving [facility].     Review of Systems   Constitutional: Negative for chills, fever and malaise/fatigue.   HENT: Negative for sore throat.    Respiratory: Positive for cough. Negative for sputum production, shortness of breath and wheezing.    Cardiovascular: Negative for chest pain, palpitations and orthopnea.   Gastrointestinal: Negative for abdominal pain, diarrhea, nausea and vomiting.   Musculoskeletal: Positive for back pain. Negative for myalgias.   Skin: Negative for itching and rash.   Neurological: Negative for dizziness, tingling, focal weakness, weakness and headaches.     Past Medical History:   Diagnosis Date   • Low back pain 9/26/2011      Past Surgical History:   Procedure Laterality Date   • VASECTOMY        Social History     Socioeconomic History   • Marital status:      Spouse name: Not on file   • Number of children: Not on file   • Years of education: Not on file   • Highest education level: Not on file   Occupational History  "  • Not on file   Social Needs   • Financial resource strain: Not on file   • Food insecurity     Worry: Not on file     Inability: Not on file   • Transportation needs     Medical: Not on file     Non-medical: Not on file   Tobacco Use   • Smoking status: Never Smoker   • Smokeless tobacco: Never Used   Substance and Sexual Activity   • Alcohol use: Yes     Comment: occasional;   • Drug use: No   • Sexual activity: Yes   Lifestyle   • Physical activity     Days per week: Not on file     Minutes per session: Not on file   • Stress: Not on file   Relationships   • Social connections     Talks on phone: Not on file     Gets together: Not on file     Attends [employer] service: Not on file     Active member of club or organization: Not on file     Attends meetings of clubs or organizations: Not on file     Relationship status: Not on file   • Intimate partner violence     Fear of current or ex partner: Not on file     Emotionally abused: Not on file     Physically abused: Not on file     Forced sexual activity: Not on file   Other Topics Concern   • Not on file   Social History Narrative   • Not on file    Patient has no known allergies.       Objective:   /80 (BP Location: Right arm, Patient Position: Sitting, BP Cuff Size: Adult long)   Pulse 92   Temp 36.6 °C (97.8 °F) (Temporal)   Resp 16   Ht 1.753 m (5' 9\")   Wt (!) 125.6 kg (277 lb)   SpO2 97%   BMI 40.91 kg/m²   Physical Exam  Vitals signs reviewed.   Constitutional:       Appearance: Normal appearance.   Cardiovascular:      Rate and Rhythm: Normal rate and regular rhythm.      Heart sounds: Normal heart sounds.   Pulmonary:      Effort: Pulmonary effort is normal.      Breath sounds: Normal breath sounds.   Musculoskeletal:      Comments: No bony tender ness from cervical to sacrum, no step off or deformity noted. Spasm noted to Paraspinous muscle along spinal column. Slight pain with palpation to the muscle. Spinal flexion to 90 degrees without " pain, pain with left sided spinal rotation. Negative SLR, negative Juju's    Skin:     General: Skin is warm.   Neurological:      Mental Status: He is alert and oriented to person, place, and time.   Psychiatric:         Mood and Affect: Mood normal.         Behavior: Behavior normal.         Thought Content: Thought content normal.         Judgment: Judgment normal.           Assessment/Plan:   1. Cough  - benzonatate (TESSALON) 100 MG Cap; Take 1 Cap by mouth 3 times a day as needed for Cough.  Dispense: 60 Cap; Refill: 0    2. Spasm of back muscles  - baclofen (LIORESAL) 10 MG Tab; Take 1 Tab by mouth 3 times a day for 15 days.  Dispense: 45 Tab; Refill: 0  - REFERRAL TO FOLLOW-UP WITH PRIMARY CARE    Discussed physical examination findings as well as patient presentation and lack of mechanism of injury is consistent with spasm of the back muscles along the paraspinous in the cervical, thoracic, lumbar and sacrum.  Will provide patient with baclofen to take up to 3 times a day and may use over-the-counter NSAIDs and Tylenol per 's instructions.  Discuss supportive care including rest, ice, gentle exercises.    Also provided patient with referral to follow-up with primary care for establishment of care    Supportive care, differential diagnoses, and indications for immediate follow-up discussed with patient.    Pathogenesis of diagnosis discussed including typical length and natural progression. Patient expresses understanding and agrees to plan.    Instructed patient to return to clinic for worsening symptoms or symptoms that persist for 7 to 10 days     Please note that this dictation was created using voice recognition software. I have made every reasonable attempt to correct obvious errors, but I expect that there are errors of grammar and possibly content that I did not discover before finalizing the note.    Note electronically signed by ROSA Calhoun

## 2020-09-17 NOTE — LETTER
September 17, 2020         Patient: Rangel Malik   YOB: 1970   Date of Visit: 9/17/2020           To Whom it May Concern:    Rangel Malik was seen in my clinic on 9/17/2020. He may return to work on 09/20/2020  If you have any questions or concerns, please don't hesitate to call.        Sincerely,           JESSICA Berger.  Electronically Signed

## 2020-09-21 ENCOUNTER — OFFICE VISIT (OUTPATIENT)
Dept: URGENT CARE | Facility: PHYSICIAN GROUP | Age: 50
End: 2020-09-21
Payer: COMMERCIAL

## 2020-09-21 VITALS
OXYGEN SATURATION: 97 % | BODY MASS INDEX: 41.03 KG/M2 | HEART RATE: 74 BPM | WEIGHT: 277 LBS | DIASTOLIC BLOOD PRESSURE: 76 MMHG | HEIGHT: 69 IN | RESPIRATION RATE: 16 BRPM | TEMPERATURE: 98.7 F | SYSTOLIC BLOOD PRESSURE: 116 MMHG

## 2020-09-21 DIAGNOSIS — M54.6 ACUTE LEFT-SIDED THORACIC BACK PAIN: ICD-10-CM

## 2020-09-21 DIAGNOSIS — M62.830 BACK SPASM: ICD-10-CM

## 2020-09-21 PROCEDURE — 99214 OFFICE O/P EST MOD 30 MIN: CPT | Performed by: PHYSICIAN ASSISTANT

## 2020-09-21 RX ORDER — METHYLPREDNISOLONE 4 MG/1
TABLET ORAL
Qty: 21 TAB | Refills: 0 | Status: SHIPPED | OUTPATIENT
Start: 2020-09-21 | End: 2020-09-28

## 2020-09-21 RX ORDER — CYCLOBENZAPRINE HCL 10 MG
10 TABLET ORAL NIGHTLY PRN
Qty: 15 TAB | Refills: 0 | Status: SHIPPED | OUTPATIENT
Start: 2020-09-21 | End: 2020-11-17

## 2020-09-21 ASSESSMENT — ENCOUNTER SYMPTOMS
FALLS: 0
EYES NEGATIVE: 1
FOCAL WEAKNESS: 0
BACK PAIN: 1
CONSTITUTIONAL NEGATIVE: 1
NECK PAIN: 0
TINGLING: 0
CARDIOVASCULAR NEGATIVE: 1
SENSORY CHANGE: 0
GASTROINTESTINAL NEGATIVE: 1

## 2020-09-21 ASSESSMENT — FIBROSIS 4 INDEX: FIB4 SCORE: 1.02

## 2020-09-21 NOTE — LETTER
September 21, 2020         Patient: Rangel Malik   YOB: 1970   Date of Visit: 9/21/2020           To Whom it May Concern:    Rangel Malik was seen in my clinic on 9/21/2020. Please excuse from work today through 09/23/2020.     If you have any questions or concerns, please don't hesitate to call.        Sincerely,           Aris Chambers P.A.-C.  Electronically Signed

## 2020-09-21 NOTE — PATIENT INSTRUCTIONS
Muscle Cramps and Spasms  Muscle cramps and spasms are when muscles tighten by themselves. They usually get better within minutes. Muscle cramps are painful. They are usually stronger and last longer than muscle spasms. Muscle spasms may or may not be painful. They can last a few seconds or much longer.  Cramps and spasms can affect any muscle, but they occur most often in the calf muscles of the leg. They are usually not caused by a serious problem. In many cases, the cause is not known. Some common causes include:  · Doing more physical work or exercise than your body is ready for.  · Using the muscles too much (overuse) by repeating certain movements too many times.  · Staying in a certain position for a long time.  · Playing a sport or doing an activity without preparing properly.  · Using bad form or technique while playing a sport or doing an activity.  · Not having enough water in your body (dehydration).  · Injury.  · Side effects of some medicines.  · Low levels of the salts and minerals in your blood (electrolytes), such as low potassium or calcium.  Follow these instructions at home:  Managing pain and stiffness         · Massage, stretch, and relax the muscle. Do this for many minutes at a time.  · If told, put heat on tight or tense muscles as often as told by your doctor. Use the heat source that your doctor recommends, such as a moist heat pack or a heating pad.  ? Place a towel between your skin and the heat source.  ? Leave the heat on for 20-30 minutes.  ? Remove the heat if your skin turns bright red. This is very important if you are not able to feel pain, heat, or cold. You may have a greater risk of getting burned.  · If told, put ice on the affected area. This may help if you are sore or have pain after a cramp or spasm.  ? Put ice in a plastic bag.  ? Place a towel between your skin and the bag.  ? Leave the ice on for 20 minutes, 2-3 times a day.  · Try taking hot showers or baths to help  relax tight muscles.  Eating and drinking  · Drink enough fluid to keep your pee (urine) pale yellow.  · Eat a healthy diet to help ensure that your muscles work well. This should include:  ? Fruits and vegetables.  ? Lean protein.  ? Whole grains.  ? Low-fat or nonfat dairy products.  General instructions  · If you are having cramps often, avoid intense exercise for several days.  · Take over-the-counter and prescription medicines only as told by your doctor.  · Watch for any changes in your symptoms.  · Keep all follow-up visits as told by your doctor. This is important.  Contact a doctor if:  · Your cramps or spasms get worse or happen more often.  · Your cramps or spasms do not get better with time.  Summary  · Muscle cramps and spasms are when muscles tighten by themselves. They usually get better within minutes.  · Cramps and spasms occur most often in the calf muscles of the leg.  · Massage, stretch, and relax the muscle. This may help the cramp or spasm go away.  · Drink enough fluid to keep your pee (urine) pale yellow.  This information is not intended to replace advice given to you by your health care provider. Make sure you discuss any questions you have with your health care provider.  Document Released: 11/30/2009 Document Revised: 05/13/2019 Document Reviewed: 05/13/2019  [employer] Patient Education © 2020 [employer]  Low Back Sprain or Strain Rehab  Ask your health care provider which exercises are safe for you. Do exercises exactly as told by your health care provider and adjust them as directed. It is normal to feel mild stretching, pulling, tightness, or discomfort as you do these exercises. Stop right away if you feel sudden pain or your pain gets worse. Do not begin these exercises until told by your health care provider.  Stretching and range-of-motion exercises  These exercises warm up your muscles and joints and improve the movement and flexibility of your back. These exercises also help to  relieve pain, numbness, and tingling.  Lumbar rotation    1. Lie on your back on a firm surface and bend your knees.  2. Straighten your arms out to your sides so each arm forms a 90-degree angle (right angle) with a side of your body.  3. Slowly move (rotate) both of your knees to one side of your body until you feel a stretch in your lower back (lumbar). Try not to let your shoulders lift off the floor.  4. Hold this position for __________ seconds.  5. Tense your abdominal muscles and slowly move your knees back to the starting position.  6. Repeat this exercise on the other side of your body.  Repeat __________ times. Complete this exercise __________ times a day.  Single knee to chest    1. Lie on your back on a firm surface with both legs straight.  2. Bend one of your knees. Use your hands to move your knee up toward your chest until you feel a gentle stretch in your lower back and buttock.  ? Hold your leg in this position by holding on to the front of your knee.  ? Keep your other leg as straight as possible.  3. Hold this position for __________ seconds.  4. Slowly return to the starting position.  5. Repeat with your other leg.  Repeat __________ times. Complete this exercise __________ times a day.  Prone extension on elbows    1. Lie on your abdomen on a firm surface (prone position).  2. Prop yourself up on your elbows.  3. Use your arms to help lift your chest up until you feel a gentle stretch in your abdomen and your lower back.  ? This will place some of your body weight on your elbows. If this is uncomfortable, try stacking pillows under your chest.  ? Your hips should stay down, against the surface that you are lying on. Keep your hip and back muscles relaxed.  4. Hold this position for __________ seconds.  5. Slowly relax your upper body and return to the starting position.  Repeat __________ times. Complete this exercise __________ times a day.  Strengthening exercises  These exercises build  strength and endurance in your back. Endurance is the ability to use your muscles for a long time, even after they get tired.  Pelvic tilt  This exercise strengthens the muscles that lie deep in the abdomen.  1. Lie on your back on a firm surface. Bend your knees and keep your feet flat on the floor.  2. Tense your abdominal muscles. Tip your pelvis up toward the ceiling and flatten your lower back into the floor.  ? To help with this exercise, you may place a small towel under your lower back and try to push your back into the towel.  3. Hold this position for __________ seconds.  4. Let your muscles relax completely before you repeat this exercise.  Repeat __________ times. Complete this exercise __________ times a day.  Alternating arm and leg raises    1. Get on your hands and knees on a firm surface. If you are on a hard floor, you may want to use padding, such as an exercise mat, to cushion your knees.  2. Line up your arms and legs. Your hands should be directly below your shoulders, and your knees should be directly below your hips.  3. Lift your left leg behind you. At the same time, raise your right arm and straighten it in front of you.  ? Do not lift your leg higher than your hip.  ? Do not lift your arm higher than your shoulder.  ? Keep your abdominal and back muscles tight.  ? Keep your hips facing the ground.  ? Do not arch your back.  ? Keep your balance carefully, and do not hold your breath.  4. Hold this position for __________ seconds.  5. Slowly return to the starting position.  6. Repeat with your right leg and your left arm.  Repeat __________ times. Complete this exercise __________ times a day.  Abdominal set with straight leg raise    1. Lie on your back on a firm surface.  2. Bend one of your knees and keep your other leg straight.  3. Tense your abdominal muscles and lift your straight leg up, 4-6 inches (10-15 cm) off the ground.  4. Keep your abdominal muscles tight and hold this  position for __________ seconds.  ? Do not hold your breath.  ? Do not arch your back. Keep it flat against the ground.  5. Keep your abdominal muscles tense as you slowly lower your leg back to the starting position.  6. Repeat with your other leg.  Repeat __________ times. Complete this exercise __________ times a day.  Single leg lower with bent knees  1. Lie on your back on a firm surface.  2. Tense your abdominal muscles and lift your feet off the floor, one foot at a time, so your knees and hips are bent in 90-degree angles (right angles).  ? Your knees should be over your hips and your lower legs should be parallel to the floor.  3. Keeping your abdominal muscles tense and your knee bent, slowly lower one of your legs so your toe touches the ground.  4. Lift your leg back up to return to the starting position.  ? Do not hold your breath.  ? Do not let your back arch. Keep your back flat against the ground.  5. Repeat with your other leg.  Repeat __________ times. Complete this exercise __________ times a day.  Posture and body mechanics  Good posture and healthy body mechanics can help to relieve stress in your body's tissues and joints. Body mechanics refers to the movements and positions of your body while you do your daily activities. Posture is part of body mechanics. Good posture means:  · Your spine is in its natural S-curve position (neutral).  · Your shoulders are pulled back slightly.  · Your head is not tipped forward.  Follow these guidelines to improve your posture and body mechanics in your everyday activities.  Standing    · When standing, keep your spine neutral and your feet about hip width apart. Keep a slight bend in your knees. Your ears, shoulders, and hips should line up.  · When you do a task in which you  one place for a long time, place one foot up on a stable object that is 2-4 inches (5-10 cm) high, such as a footstool. This helps keep your spine neutral.  Sitting    · When  sitting, keep your spine neutral and keep your feet flat on the floor. Use a footrest, if necessary, and keep your thighs parallel to the floor. Avoid rounding your shoulders, and avoid tilting your head forward.  · When working at a desk or a computer, keep your desk at a height where your hands are slightly lower than your elbows. Slide your chair under your desk so you are close enough to maintain good posture.  · When working at a computer, place your monitor at a height where you are looking straight ahead and you do not have to tilt your head forward or downward to look at the screen.  Resting  · When lying down and resting, avoid positions that are most painful for you.  · If you have pain with activities such as sitting, bending, stooping, or squatting, lie in a position in which your body does not bend very much. For example, avoid curling up on your side with your arms and knees near your chest (fetal position).  · If you have pain with activities such as standing for a long time or reaching with your arms, lie with your spine in a neutral position and bend your knees slightly. Try the following positions:  ? Lying on your side with a pillow between your knees.  ? Lying on your back with a pillow under your knees.  Lifting    · When lifting objects, keep your feet at least shoulder width apart and tighten your abdominal muscles.  · Bend your knees and hips and keep your spine neutral. It is important to lift using the strength of your legs, not your back. Do not lock your knees straight out.  · Always ask for help to lift heavy or awkward objects.  This information is not intended to replace advice given to you by your health care provider. Make sure you discuss any questions you have with your health care provider.  Document Released: 12/18/2006 Document Revised: 04/10/2020 Document Reviewed: 01/09/2020  [employer] Patient Education © 2020 [employer]

## 2020-09-21 NOTE — PROGRESS NOTES
Subjective:   Rangel Malik is a 49 y.o. male who presents for Back Pain (Seen last Thursday for back pain.  Pt has not improved with medications.)      HPI  Patient is a 49-year-old male with complaints of low and mid back pain onset 5 days.  He was seen 4 days ago in the clinic and given baclofen and cough medicine.  Cough is improved.  He returns due to continued waxing and waning back pain.  His back pain is worse with certain positions, bending over, and twisting his back.  Currently, his pain is mild 3 out of 10 in severity primarily located to his left mid paraspinal area.  He also has been taking ibuprofen with some relief.  He states that baclofen does not help at all.  Denies any mechanism of injury.  Describes his back pain as a tightness.  Denies any numbness, tingling, weakness.  Denies any pain down his legs, numbness, tingling.  Denies any saddle anesthesia.  Denies any chest pain, shortness of breath, abdominal pain nausea vomiting.         Review of Systems   Constitutional: Negative.    HENT: Negative.    Eyes: Negative.    Cardiovascular: Negative.    Gastrointestinal: Negative.    Musculoskeletal: Positive for back pain. Negative for falls and neck pain.   Neurological: Negative for tingling, sensory change and focal weakness.       Medications:    • acetaminophen Tabs  • baclofen Tabs  • benzonatate Caps    Allergies: Patient has no known allergies.    Problem List: Rangel Malik has Lumbar back pain; Obesity (BMI 35.0-39.9 without comorbidity) ([facility]); Chronic left shoulder pain; Chronic pain of both knees; Shortness of breath on exertion; Leg swelling; Rash and nonspecific skin eruption; Impaired fasting blood sugar; and Idiopathic chronic gout of multiple sites without tophus on their problem list.    Surgical History:  Past Surgical History:   Procedure Laterality Date   • VASECTOMY         Past Social Hx: Rangel Malik  reports that he has never smoked. He has never used  "smokeless tobacco. He reports current alcohol use. He reports that he does not use drugs.     Past Family Hx:  Rangel Malik family history is not on file.     Problem list, medications, and allergies reviewed by myself today in Epic.     Objective:     /76   Pulse 74   Temp 37.1 °C (98.7 °F)   Resp 16   Ht 1.753 m (5' 9\")   Wt (!) 125.6 kg (277 lb)   SpO2 97%   BMI 40.91 kg/m²     Physical Exam  Vitals signs reviewed.   Constitutional:       General: He is not in acute distress.     Appearance: Normal appearance. He is not ill-appearing or toxic-appearing.   Eyes:      Conjunctiva/sclera: Conjunctivae normal.      Pupils: Pupils are equal, round, and reactive to light.   Cardiovascular:      Rate and Rhythm: Normal rate and regular rhythm.      Heart sounds: Normal heart sounds.   Pulmonary:      Effort: Pulmonary effort is normal. No respiratory distress.      Breath sounds: Normal breath sounds. No wheezing, rhonchi or rales.   Musculoskeletal:      Comments: Back: Full range of flexion, extension, rotation, lateralization.   Tenderness to palpation to left lower thoracic/upper lumbar paraspinal muscles. Positive spasm.  Negative midline tenderness, bony tenderness, crepitus, deformities, or step-offs.  Negative straight leg raise         Lymphadenopathy:      Cervical: No cervical adenopathy.   Neurological:      General: No focal deficit present.      Mental Status: He is oriented to person, place, and time.      Sensory: Sensation is intact.      Coordination: Coordination is intact.      Gait: Gait is intact.      Deep Tendon Reflexes:      Reflex Scores:       Patellar reflexes are 2+ on the right side and 2+ on the left side.       Achilles reflexes are 2+ on the right side and 2+ on the left side.     Comments: Strength 5 out of 5 to all extremities.   Psychiatric:         Mood and Affect: Mood normal.         Behavior: Behavior normal.         Assessment/Plan:     Diagnosis and " associated orders:     1. Back spasm  cyclobenzaprine (FLEXERIL) 10 MG Tab    methylPREDNISolone (MEDROL DOSEPAK) 4 MG Tablet Therapy Pack   2. Acute left-sided thoracic back pain        Comments/MDM:     • Discussed with patient most likely muscular due to signs and symptoms.  Discussed needs more time and exercise and symptomatic relief.   Treatment of initial rest with no heavy lifting, stooping, or strenuous activity. Massage, ice and/or heat which ever feels better, and Ibuprofen per manufacture's instructions. Encouraged walking, stretching, and range of motion exercises as tolerated. Avoid sitting or laying down for long periods of time except for at night during sleep.   Treatment of cyclobenzaprine. Instructed NOT TO TAKE WITH BACLOFEN CURRENT BACLOFEN PRESCRIPTION AS THIS CAN INCREASE SEDATION EFFECTS AND DANGEROUS FOR RESPIRATORY DEPRESSION.   • Discussed with patient this medication can cause drowsiness/sedation. The patient was instructed to use medication mostly during the evening/night time. Instructed to avoid driving, operating machinery, or drinking alcohol while using this medication. Patient verbalized understanding and agreement.     Medrol conrado.  Take this medication in the morning.  Discussed side effects and risk.    Overall, the patient is well-appearing.  He is requesting a work note.  Awaiting referral appointment for PCP.     Return to the urgent care if no improvement or worsening in the next week or any other concerns.       Red flags discussed and indications to immediately call 911 or present to the Emergency Department.   Supportive care, differential diagnoses, and indications for immediate follow-up discussed with patient.    Pathogenesis of diagnosis discussed including typical length and natural progression. Patient expresses understanding and agrees to plan.    Advised the patient to follow-up with the primary care physician for recheck, reevaluation, and consideration of further  management.    Please note that this dictation was created using voice recognition software. I have made a reasonable attempt to correct obvious errors, but I expect that there are errors of grammar and possibly content that I did not discover before finalizing the note.    This note was electronically signed by Aris Chambers PA-C

## 2020-09-28 ENCOUNTER — OFFICE VISIT (OUTPATIENT)
Dept: URGENT CARE | Facility: PHYSICIAN GROUP | Age: 50
End: 2020-09-28
Payer: COMMERCIAL

## 2020-09-28 VITALS
HEIGHT: 69 IN | DIASTOLIC BLOOD PRESSURE: 90 MMHG | BODY MASS INDEX: 41.03 KG/M2 | OXYGEN SATURATION: 96 % | TEMPERATURE: 98.5 F | HEART RATE: 86 BPM | WEIGHT: 277 LBS | RESPIRATION RATE: 18 BRPM | SYSTOLIC BLOOD PRESSURE: 132 MMHG

## 2020-09-28 DIAGNOSIS — M54.9 ACUTE MIDLINE BACK PAIN, UNSPECIFIED BACK LOCATION: Primary | ICD-10-CM

## 2020-09-28 PROCEDURE — 99214 OFFICE O/P EST MOD 30 MIN: CPT | Performed by: PHYSICIAN ASSISTANT

## 2020-09-28 RX ORDER — IBUPROFEN 600 MG/1
600 TABLET ORAL EVERY 8 HOURS PRN
Qty: 30 TAB | Refills: 0 | Status: SHIPPED | OUTPATIENT
Start: 2020-09-28 | End: 2021-01-25

## 2020-09-28 RX ORDER — KETOROLAC TROMETHAMINE 30 MG/ML
60 INJECTION, SOLUTION INTRAMUSCULAR; INTRAVENOUS ONCE
Status: DISCONTINUED | OUTPATIENT
Start: 2020-09-28 | End: 2020-09-28

## 2020-09-28 RX ORDER — KETOROLAC TROMETHAMINE 30 MG/ML
30 INJECTION, SOLUTION INTRAMUSCULAR; INTRAVENOUS ONCE
Status: COMPLETED | OUTPATIENT
Start: 2020-09-28 | End: 2020-09-28

## 2020-09-28 RX ADMIN — KETOROLAC TROMETHAMINE 30 MG: 30 INJECTION, SOLUTION INTRAMUSCULAR; INTRAVENOUS at 10:22

## 2020-09-28 ASSESSMENT — ENCOUNTER SYMPTOMS
PERIANAL NUMBNESS: 0
BACK PAIN: 1
CONSTIPATION: 0
FEVER: 0
NUMBNESS: 0
PARESIS: 0
DIARRHEA: 0
NAUSEA: 0
CHILLS: 0
BOWEL INCONTINENCE: 0
TINGLING: 0
ABDOMINAL PAIN: 0
COUGH: 0
VOMITING: 0
SHORTNESS OF BREATH: 0

## 2020-09-28 ASSESSMENT — FIBROSIS 4 INDEX: FIB4 SCORE: 1.02

## 2020-09-28 NOTE — LETTER
September 28, 2020         Patient: Rangel Malik   YOB: 1970   Date of Visit: 9/28/2020           To Whom it May Concern:    Rangel Malik was seen in my clinic on 9/28/2020. He may return to work on 9/30/20 or sooner if symptoms improve.     If you have any questions or concerns, please don't hesitate to call.        Sincerely,           Marie Lancaster P.A.-C.  Electronically Signed

## 2020-09-28 NOTE — PROGRESS NOTES
Subjective:   Rangel Malik is a 49 y.o. male who presents for Back Pain (x 2.5 weeks, not getting any better )        Back Pain  This is a chronic problem. Episode onset: 2 weeks  The problem occurs constantly. The problem is unchanged. The pain is present in the lumbar spine. The pain does not radiate. Pertinent negatives include no abdominal pain, bladder incontinence, bowel incontinence, fever, numbness, paresis, perianal numbness or tingling. Treatments tried: tylenol, ibuprofen, flexeril       Patient has been seen multiple times in the urgent care for chronic back pain.  His most recent flareup started 2 weeks ago.  He was given prescriptions for baclofen, Flexeril, Medrol Dosepak without relief.  He does note a previous injury occurring 5 years ago at work from twisting.  He completed physical therapy at the time with relief.  He does not recall a recent injury to the area.  No history of kidney stones or kidney infection.    He has not been able to establish with a primary care provider.    Review of Systems   Constitutional: Negative for chills, fever and malaise/fatigue.   Respiratory: Negative for cough and shortness of breath.    Gastrointestinal: Negative for abdominal pain, bowel incontinence, constipation, diarrhea, nausea and vomiting.   Genitourinary: Negative for bladder incontinence.   Musculoskeletal: Positive for back pain. Negative for joint pain.   Neurological: Negative for tingling and numbness.   All other systems reviewed and are negative.      PMH:  has a past medical history of Low back pain (9/26/2011).  MEDS:   Current Outpatient Medications:   •  ibuprofen (MOTRIN) 600 MG Tab, Take 1 Tab by mouth every 8 hours as needed., Disp: 30 Tab, Rfl: 0  •  cyclobenzaprine (FLEXERIL) 10 MG Tab, Take 1 Tab by mouth at bedtime as needed for Moderate Pain or Muscle Spasms., Disp: 15 Tab, Rfl: 0  •  acetaminophen (TYLENOL) 325 MG Tab, Take 650 mg by mouth every 6 hours as needed for Mild  "Pain., Disp: , Rfl:     Current Facility-Administered Medications:   •  ketorolac (TORADOL) injection 30 mg, 30 mg, Intramuscular, Once, FRIDA ZacariasAShashi-CITLALI.  ALLERGIES: No Known Allergies  SURGHX:   Past Surgical History:   Procedure Laterality Date   • VASECTOMY       SOCHX:  reports that he has never smoked. He has never used smokeless tobacco. He reports current alcohol use. He reports that he does not use drugs.  Family History   Problem Relation Age of Onset   • Cancer Neg Hx    • Diabetes Neg Hx    • Heart Disease Neg Hx    • Hypertension Neg Hx    • Hyperlipidemia Neg Hx    • Stroke Neg Hx         Objective:   /90 (BP Location: Right arm, Patient Position: Sitting, BP Cuff Size: Large adult)   Pulse 86   Temp 36.9 °C (98.5 °F) (Tympanic)   Resp 18   Ht 1.753 m (5' 9\")   Wt (!) 125.6 kg (277 lb)   SpO2 96%   BMI 40.91 kg/m²     Physical Exam  Vitals signs reviewed.   Constitutional:       General: He is not in acute distress.     Appearance: He is well-developed.   HENT:      Head: Normocephalic and atraumatic.      Right Ear: External ear normal.      Left Ear: External ear normal.      Nose: Nose normal.      Mouth/Throat:      Mouth: Mucous membranes are moist.   Eyes:      Conjunctiva/sclera: Conjunctivae normal.      Pupils: Pupils are equal, round, and reactive to light.   Neck:      Musculoskeletal: Normal range of motion and neck supple.      Trachea: No tracheal deviation.   Cardiovascular:      Rate and Rhythm: Normal rate and regular rhythm.   Pulmonary:      Effort: Pulmonary effort is normal.      Breath sounds: Normal breath sounds.   Musculoskeletal:        Back:    Skin:     General: Skin is warm and dry.      Capillary Refill: Capillary refill takes less than 2 seconds.   Neurological:      General: No focal deficit present.      Mental Status: He is alert and oriented to person, place, and time.   Psychiatric:         Mood and Affect: Mood normal.         Behavior: Behavior " normal.           Assessment/Plan:     1. Acute midline back pain, unspecified back location  REFERRAL TO PHYSICAL THERAPY Reason for Therapy: Eval/Treat/Report    ibuprofen (MOTRIN) 600 MG Tab    REFERRAL TO SPORTS MEDICINE    ketorolac (TORADOL) injection 30 mg    DISCONTINUED: ketorolac (TORADOL) injection 60 mg     Supportive care reviewed.  He will start ibuprofen tonight.  He will continue his at home.  A referral was placed to follow-up with physical therapy and sports medicine.     He will call tomorrow morning to schedule with a new primary care provider.  If symptoms worsen or persist patient can return to clinic for reevaluation.  Red flags and emergency room precautions discussed. All side effects of medication discussed including allergic response, GI upset, tendon injury, etc. Patient and wife confirmed understanding of information.    Please note that this dictation was created using voice recognition software. I have made every reasonable attempt to correct obvious errors, but I expect that there are errors of grammar and possibly content that I did not discover before finalizing the note.

## 2020-10-01 ENCOUNTER — OFFICE VISIT (OUTPATIENT)
Dept: URGENT CARE | Facility: PHYSICIAN GROUP | Age: 50
End: 2020-10-01
Payer: COMMERCIAL

## 2020-10-01 VITALS
RESPIRATION RATE: 16 BRPM | WEIGHT: 277.5 LBS | OXYGEN SATURATION: 97 % | TEMPERATURE: 97.8 F | DIASTOLIC BLOOD PRESSURE: 86 MMHG | BODY MASS INDEX: 41.1 KG/M2 | SYSTOLIC BLOOD PRESSURE: 132 MMHG | HEIGHT: 69 IN | HEART RATE: 87 BPM

## 2020-10-01 DIAGNOSIS — M54.9 ACUTE MIDLINE BACK PAIN, UNSPECIFIED BACK LOCATION: ICD-10-CM

## 2020-10-01 PROCEDURE — 99214 OFFICE O/P EST MOD 30 MIN: CPT | Performed by: PHYSICIAN ASSISTANT

## 2020-10-01 RX ORDER — KETOROLAC TROMETHAMINE 30 MG/ML
30 INJECTION, SOLUTION INTRAMUSCULAR; INTRAVENOUS ONCE
Status: COMPLETED | OUTPATIENT
Start: 2020-10-01 | End: 2020-10-01

## 2020-10-01 RX ADMIN — KETOROLAC TROMETHAMINE 30 MG: 30 INJECTION, SOLUTION INTRAMUSCULAR; INTRAVENOUS at 16:36

## 2020-10-01 ASSESSMENT — ENCOUNTER SYMPTOMS
BACK PAIN: 1
SHORTNESS OF BREATH: 0
CHILLS: 0
FEVER: 0
ABDOMINAL PAIN: 0
NAUSEA: 0
BLURRED VISION: 0
SENSORY CHANGE: 0
HEADACHES: 0
PALPITATIONS: 0
VOMITING: 0
WEIGHT LOSS: 0
TINGLING: 0

## 2020-10-01 ASSESSMENT — FIBROSIS 4 INDEX: FIB4 SCORE: 1.02

## 2020-10-01 NOTE — LETTER
October 1, 2020         Patient: Rangel Malik   YOB: 1970   Date of Visit: 10/1/2020           To Whom it May Concern:    Rangel Malik was seen in my clinic on 10/1/2020. Please excuse hs absence from work for 10/1/2020 and 10/2/2020.    If you have any questions or concerns, please don't hesitate to call.        Sincerely,           Aracelis Gillespie P.A.-C.  Electronically Signed

## 2020-10-01 NOTE — PROGRESS NOTES
"Subjective:      Won Malik is a 49 y.o. male who presents with Back Pain (has been seen 5x not getting any better, hurts with every movement, worked only for 3hrs due to pain )    HPI:  This is patient's fourth or fifth visit in the urgent care for back pain.  He was initially seen for it on 9/17/2020.  At that time symptoms were thought to be from back spasm related to cough.  He was given baclofen to use.  He was seen in the urgent care again on 9/21/2020 citing no improvement in his symptoms.  At that time he was prescribed cyclobenzaprine and methylprednisolone.  Patient returns yet again to the urgent care on 9/28/2020 stating that there was still no improvement.  He was given a prescription for 600 mg ibuprofen and was given a shot of Toradol in the office.  Referrals were also placed to sports medicine and physical therapy.  He states that the Toradol provided significant relief only for approximately 3 hours in the ibuprofen \"takes the edge off\" but he still has fairly constant pain.  He has not been appointment for physical therapy or sports medicine yet.  Both of these referrals are authorized in the system.  He denies bowel/bladder incontinence, fever/chills, saddle anesthesia, focal weakness, or radicular symptoms.          Review of Systems   Constitutional: Negative for chills, fever and weight loss.   Eyes: Negative for blurred vision.   Respiratory: Negative for shortness of breath.    Cardiovascular: Negative for chest pain and palpitations.   Gastrointestinal: Negative for abdominal pain, nausea and vomiting.   Musculoskeletal: Positive for back pain.   Neurological: Negative for tingling, sensory change and headaches.       PMH:  has a past medical history of Low back pain (9/26/2011).  MEDS:   Current Outpatient Medications:   •  ibuprofen (MOTRIN) 600 MG Tab, Take 1 Tab by mouth every 8 hours as needed., Disp: 30 Tab, Rfl: 0  •  cyclobenzaprine (FLEXERIL) 10 MG Tab, Take 1 Tab by mouth at " "bedtime as needed for Moderate Pain or Muscle Spasms. (Patient not taking: Reported on 10/1/2020), Disp: 15 Tab, Rfl: 0  •  acetaminophen (TYLENOL) 325 MG Tab, Take 650 mg by mouth every 6 hours as needed for Mild Pain., Disp: , Rfl:     Current Facility-Administered Medications:   •  ketorolac (TORADOL) injection 30 mg, 30 mg, Intramuscular, Once, FRIDA HarmonABRIONNA  ALLERGIES: No Known Allergies  SURGHX:   Past Surgical History:   Procedure Laterality Date   • VASECTOMY       SOCHX:  reports that he has never smoked. He has never used smokeless tobacco. He reports current alcohol use. He reports that he does not use drugs.  FH: Family history was reviewed, no pertinent findings to report     Objective:     /86 (BP Location: Right arm, Patient Position: Sitting, BP Cuff Size: Adult long)   Pulse 87   Temp 36.6 °C (97.8 °F) (Temporal)   Resp 16   Ht 1.753 m (5' 9\")   Wt (!) 125.9 kg (277 lb 8 oz)   SpO2 97%   BMI 40.98 kg/m²      Physical Exam  Constitutional:       Appearance: He is well-developed.   HENT:      Head: Normocephalic and atraumatic.      Right Ear: External ear normal.      Left Ear: External ear normal.   Eyes:      Conjunctiva/sclera: Conjunctivae normal.      Pupils: Pupils are equal, round, and reactive to light.   Cardiovascular:      Rate and Rhythm: Normal rate and regular rhythm.      Heart sounds: Normal heart sounds. No murmur.   Pulmonary:      Effort: Pulmonary effort is normal.      Breath sounds: Normal breath sounds. No wheezing.   Musculoskeletal:      Lumbar back: He exhibits decreased range of motion and tenderness. He exhibits no bony tenderness and no swelling.        Back:       Comments: Negative straight leg raise test bilaterally.  Patellar DTRs are 2+ and symmetric bilaterally.  5/5 strength of lower extremities bilaterally.  Decreased ROM with forward flexion.  Normal gait.   Skin:     General: Skin is warm and dry.      Capillary Refill: Capillary refill " takes less than 2 seconds.   Neurological:      Mental Status: He is alert and oriented to person, place, and time.   Psychiatric:         Behavior: Behavior normal.         Judgment: Judgment normal.            Assessment/Plan:       1. Acute midline back pain, unspecified back location  - ketorolac (TORADOL) injection 30 mg  -Patient should continue light stretching exercises as previously discussed.  Also ice/heat to the affected area.  If symptoms significantly worsen he should go to the emergency department.  He was given the information for the physical therapy office and sports medicine office.  He was instructed to call those offices today to schedule an appointment            Differential Diagnosis, natural history, and supportive care discussed. Return to the Urgent Care or follow up with your PCP if symptoms fail to resolve, or for any new or worsening symptoms. Emergency room precautions discussed. Patient and/or family appears understanding of information.

## 2020-10-06 ENCOUNTER — APPOINTMENT (OUTPATIENT)
Dept: RADIOLOGY | Facility: IMAGING CENTER | Age: 50
End: 2020-10-06
Attending: FAMILY MEDICINE
Payer: COMMERCIAL

## 2020-10-06 ENCOUNTER — OFFICE VISIT (OUTPATIENT)
Dept: MEDICAL GROUP | Facility: CLINIC | Age: 50
End: 2020-10-06
Payer: COMMERCIAL

## 2020-10-06 VITALS
HEIGHT: 69 IN | BODY MASS INDEX: 41.1 KG/M2 | HEART RATE: 88 BPM | RESPIRATION RATE: 18 BRPM | DIASTOLIC BLOOD PRESSURE: 84 MMHG | SYSTOLIC BLOOD PRESSURE: 122 MMHG | OXYGEN SATURATION: 96 % | WEIGHT: 277.5 LBS | TEMPERATURE: 98.6 F

## 2020-10-06 DIAGNOSIS — M54.6 THORACIC SPINE PAIN: ICD-10-CM

## 2020-10-06 DIAGNOSIS — M51.36 LUMBAR DEGENERATIVE DISC DISEASE: ICD-10-CM

## 2020-10-06 PROCEDURE — 99203 OFFICE O/P NEW LOW 30 MIN: CPT | Performed by: FAMILY MEDICINE

## 2020-10-06 PROCEDURE — 72072 X-RAY EXAM THORAC SPINE 3VWS: CPT | Mod: TC | Performed by: FAMILY MEDICINE

## 2020-10-06 ASSESSMENT — ENCOUNTER SYMPTOMS
FEVER: 0
SHORTNESS OF BREATH: 1
CHILLS: 0
DIZZINESS: 0
VOMITING: 0
NAUSEA: 0

## 2020-10-06 ASSESSMENT — FIBROSIS 4 INDEX: FIB4 SCORE: 1.02

## 2020-10-06 NOTE — PATIENT INSTRUCTIONS
?[facility] [address]  [address]   [place] [address]  Phone: [phone]     Patient Care Coordination notes: Ready to Schedule       NUTRITION  [email or web address]

## 2020-10-06 NOTE — PROGRESS NOTES
"Subjective:      Won Malik is a 49 y.o. male who presents with Back Pain (Referral from UC/ Back pain )     Referred by Marie Lancaster P.A.-C. 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   Respiratory: Positive for shortness of breath.    Cardiovascular: Positive for chest pain.   Gastrointestinal: Negative for nausea and vomiting.   Neurological: Negative for dizziness.   Underwent cardiac work-up approximately 5 years ago which was \"negative\"    PMH:  has a past medical history of Low back pain (9/26/2011).  MEDS:   Current Outpatient Medications:   •  ibuprofen (MOTRIN) 600 MG Tab, Take 1 Tab by mouth every 8 hours as needed., Disp: 30 Tab, Rfl: 0  •  cyclobenzaprine (FLEXERIL) 10 MG Tab, Take 1 Tab by mouth at bedtime as needed for Moderate Pain or Muscle Spasms. (Patient not taking: Reported on 10/1/2020), Disp: 15 Tab, Rfl: 0  •  acetaminophen (TYLENOL) 325 MG Tab, Take 650 mg by mouth every 6 hours as needed for Mild Pain., Disp: , Rfl:   " "  ALLERGIES: No Known Allergies  SURGHX:   Past Surgical History:   Procedure Laterality Date   • VASECTOMY       SOCHX:  reports that he has never smoked. He has never used smokeless tobacco. He reports current alcohol use. He reports that he does not use drugs.  FH: Family history was reviewed, no pertinent findings to report     Objective:     /84 (BP Location: Left arm, Patient Position: Sitting, BP Cuff Size: Large adult)   Pulse 88   Temp 37 °C (98.6 °F) (Temporal)   Resp 18   Ht 1.753 m (5' 9\")   Wt (!) 125.9 kg (277 lb 8 oz)   SpO2 96%   BMI 40.98 kg/m²      Physical Exam  Constitutional:       Appearance: He is well-developed.   Pulmonary:      Effort: Pulmonary effort is normal.   Neurological:      Mental Status: He is alert.           Some hamstring tightness with forward flexion  Some discomfort with extension  UMBILICAL reflex on LEFT present and on RIGHT ABSECENT  Lumbar spine tightness     Assessment/Plan:        1. Thoracic spine pain  CANCELED: DX-THORACIC SPINE-2 VIEWS   2. Lumbar degenerative disc disease (L5-S1)       Thoracic spine pain  Most recent episode became more severe approximately 3 weeks ago (around mid September 2020)  No recent injury  Midthoracic pain tightening and tingling    Patient is fairly sedentary  Provided home exercise program    Physical therapy has already been approved, scheduling information was provided TODAY    Return in about 6 weeks (around 11/17/2020).  See how is doing with formal physical therapy  And with weight loss ([email or web address])  He is open to referral for health improvement consultation, but he would like to try on his own first            10/6/2020 8:31 AM     HISTORY/REASON FOR EXAM:  Atraumatic Pain.        TECHNIQUE/EXAM DESCRIPTION AND NUMBER OF VIEWS:  Thoracic spine, 3 views.     COMPARISON: None.     FINDINGS:  Vertebral alignment is maintained. No compression fracture is identified. Evaluation of the upper thoracic spine on the " lateral view is limited by overlying soft tissues.        IMPRESSION:     No fracture is identified.           10/31/2018 9:44 AM     HISTORY/REASON FOR EXAM:  Atraumatic Pain  Back pain     TECHNIQUE/ EXAM DESCRIPTION AND NUMBER OF VIEWS:  3 views of the lumbar spine.     COMPARISON: 4/6/2012     FINDINGS:  Vertebral body height and anatomic alignment are maintained.    No acute fracture.    There is osteophytic spurring from the endplates of the lower lumbar spine. There is moderate disc space narrowing with sclerosis at L5-S1. Facet arthropathy is at L5-S1.     IMPRESSION:      1.  Degenerative disc disease and facet arthropathy in the lower lumbar spine. Appearance is similar to the prior exam in 2012.    Interpreted in the office today with the patient    Thank you Marie Lancaster P.A.-C.  For allowing me to participate in caring for your patient.

## 2020-10-09 ENCOUNTER — PHYSICAL THERAPY (OUTPATIENT)
Dept: PHYSICAL THERAPY | Facility: REHABILITATION | Age: 50
End: 2020-10-09
Attending: PHYSICIAN ASSISTANT
Payer: COMMERCIAL

## 2020-10-09 DIAGNOSIS — M54.9 ACUTE MIDLINE BACK PAIN, UNSPECIFIED BACK LOCATION: ICD-10-CM

## 2020-10-09 PROCEDURE — 97014 ELECTRIC STIMULATION THERAPY: CPT

## 2020-10-09 PROCEDURE — 97161 PT EVAL LOW COMPLEX 20 MIN: CPT

## 2020-10-09 ASSESSMENT — ENCOUNTER SYMPTOMS
ALLEVIATING FACTORS: HEAT APPLICATION
PAIN SCALE AT HIGHEST: 10
PAIN SCALE AT LOWEST: 2
PAIN SCALE: 2
EXACERBATED BY: PROLONGED STANDING
EXACERBATED BY: BENDING
EXACERBATED BY: LIFTING
EXACERBATED BY: TWISTING
EXACERBATED BY: SNEEZING

## 2020-10-09 ASSESSMENT — ACTIVITIES OF DAILY LIVING (ADL): POOR_BALANCE: 1

## 2020-10-09 NOTE — OP THERAPY EVALUATION
"  Outpatient Physical Therapy  INITIAL EVALUATION    [facility] Physical [address]  [address]  [place] [address]  Phone:  [phone]  Fax:  [phone]    Date of Evaluation: 10/09/2020    Patient: Won Malik  YOB: 1970  MRN: [id number]     Referring Provider: Marie Lancaster P.A.-C.  [address]  [address]  [place] [address]   Referring Diagnosis Acute midline back pain, unspecified back location [M54.9]     Time Calculation    Start time: 815  Stop time: 929 Time Calculation (min): 74 minutes         Chief Complaint: Back Problem    Visit Diagnoses     ICD-10-CM   1. Acute midline back pain, unspecified back location  M54.9         Subjective:   History of Present Illness:     Mechanism of injury:    Pt presents to PT with complaint of LBP.  States it has been a problem on and off for many years.  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  Had work comp case with PT in . Over the years, the pain has come and gone, but states for the last 3 weeks the pain has been unbearable. 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 He relates this flare up to be related to a lot of coughing around the time of onset (due to the smoke in the air). Currently describing central lower back pain that extends up into the thoracic area.  Denies N/T in the lower body.    Prior level of function:  Currently out of work. 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   Sleep disturbance:  Interrupted sleep (\"for other reasons\")  Pain:     Current pain ratin    At best pain ratin    At worst pain rating:  10    Relieving factors:  Heat application (OTC meds- tylenol, just Rx'd 600 mg ibuprofen.  Seated with good back support is the most comfortable)    Aggravating factors:  Bending, twisting, lifting, prolonged standing and sneezing (standing longer than " 15, walking longer than 30 mins)    Progression:  Stable  Social Support:     Lives in:  Multiple-level home (split level home)    Lives with:  Spouse  Diagnostic Tests:     X-ray: normal (no signs of fx. Some disc space narrowing in the T/S and lumbar facet DJD.  See media and Dr. Christine's office visit note. )    Treatments:     None    Patient Goals:     Patient goals for therapy:  Decreased pain, increased motion, independence with ADLs/IADLs, increased strength, return to sport/leisure activities and return to work      Past Medical History:   Diagnosis Date   • Low back pain 9/26/2011     Past Surgical History:   Procedure Laterality Date   • VASECTOMY       Social History     Tobacco Use   • Smoking status: Never Smoker   • Smokeless tobacco: Never Used   Substance Use Topics   • Alcohol use: Yes     Comment: occasional;     Family and Occupational History     Socioeconomic History   • Marital status:      Spouse name: Not on file   • Number of children: Not on file   • Years of education: Not on file   • Highest education level: Not on file   Occupational History   • Not on file       Objective     Observations   Central spine     Positive for hypolordosis.    Additional Observation Details  Fwd head, rounded shoulders    Postural Observations  Seated posture: fair  Standing posture: fair        Hip Screen   Hip range of motion within functional limits.  Hip strength within functional limits    Neurological Testing     Reflexes   Left   Patellar (L4): normal (2+)    Right   Patellar (L4): normal (2+)    Myotome testing   Lumbar (left)   All left lumbar myotomes within normal limits    Lumbar (right)   All right lumbar myotomes within normal limits    Dermatome testing   Lumbar (left)   All left lumbar dermatomes intact    Lumbar (right)   All right lumbar dermatomes intact    Palpation   Left   Tenderness of the lumbar paraspinals, piriformis and quadratus lumborum.     Right   Tenderness of the  lumbar paraspinals.     Tenderness     Left Hip   Tenderness in the iliac crest and iliolumbar ligament.  No tenderness in the sacroiliac joint.     Right Hip   Tenderness in the iliac crest and iliolumbar ligament. No tenderness in the sacroiliac joint.     Active Range of Motion     Lumbar   Flexion: within functional limits (FT to ankles)  Extension: decreased (L lumbar pain extending to hip)  Left lateral flexion: decreased (75% of full)  Right lateral flexion: decreased (75%)  Left rotation: decreased (pulls down the L hip)  Right rotation: decreased    Joint Play   Spine     Central PA [place]        T10: hypomobile       T11: hypomobile       T12: hypomobile       L1: hypomobile       L2: hypomobile       L3: hypomobile and painful       L4: hypomobile and painful       L5: painful and hypomobile    Unilateral PA Glide (left)        L3: painful       L4: painful       L5: painful    Unilateral PA Glide (right)        L5: painful        Strength:      Abdominals   Lower abdominals: Able to initiate but not maintain neutral    Lower extremities   Normal left lower extremity strength  Normal right lower extremity strength    Additional Strength Details  Able to bridge to full height with sense of 'pressure' in the lower back    Tests       Lumbar spine (left)      Negative slump.   Lumbar spine (right)     Negative slump.     Left Pelvic Girdle/Sacrum   Negative: sacral thrust.     Right Pelvic Girdle/Sacrum   Negative: sacral thrust.     Left Hip   SLR: Negative.     Right Hip   SLR: Negative.     Additional Tests Details  Decreased rib spring  Decreased report of central lower back pain 50% after extension bias.         Therapeutic Exercises (CPT 70811):     1. PPU, x 10, HEP    2. LTR, x 10 ea, HEP    3. Piriformis stretch, x 1 min ea    Therapeutic Treatments and Modalities:     1. E Stim Unattended (CPT 56290), Russian constant to lumbar paraspinals in prone extension x 15 min.     Time-based  treatments/modalities:           Assessment, Response and Plan:   Impairments: abnormal gait, abnormal or restricted ROM, activity intolerance, difficulty performing job, impaired functional mobility, impaired balance, impaired physical strength, lacks appropriate home exercise program and limited ADL's    Assessment details:  Mr. Malik is a 49 y.o male who presents to PT with complaint of a subacute flare up of his chronic back pain.  PT evaluation is most consistent with lumbar disc derangement.  He demonstrates habitually flexed posturing, decreased control of lumbar lordosis, decreased posterior chain strength/core stab.  Pt showed positive response to extension bias on eval.  His neuro exam was unremarkable and symptoms are central.  This problem impairs his participation in work and household activities.  Skilled PT services are indicated to address the mentioned functional limitations and enhance QOL.     Barriers to therapy:  Out-of-pocket cost of treatment  Prognosis: good    Goals:   Short Term Goals:   - Improve lumbar ext to full and painless  - Able to TA brace in all functional positions  - Able to demo indep hip hinge to prepare for functional patterns  Short term goal time span:  1-2 weeks      Long Term Goals:    - Improve RMQ to less than 5/24  - Pt able to lift 20# from the floor x 5 without LBP  - Pt demonstrates independence and compliance with HEP  Long term goal time span:  6-8 weeks    Plan:   Therapy options:  Physical therapy treatment to continue  Planned therapy interventions:  Functional Training, Self Care (CPT 28520), Therapeutic Activities (CPT 49484), Therapeutic Exercise (CPT 09164), Hot or Cold Pack Therapy (CPT 73221), Mechanical Traction (CPT 95354), Manual Therapy (CPT 51100) and Neuromuscular Re-education (CPT 67165)  Frequency:  1x week  Duration in weeks:  8  Discussed with:  Patient      Functional Assessment Used  Ashok Surinder Low Back Pain and Disability Score: 25      Referring provider co-signature:  I have reviewed this plan of care and my co-signature certifies the need for services.    Certification Period: 10/09/2020 to  12/04/20    Physician Signature: ________________________________ Date: ______________

## 2020-10-12 ENCOUNTER — PHYSICAL THERAPY (OUTPATIENT)
Dept: PHYSICAL THERAPY | Facility: REHABILITATION | Age: 50
End: 2020-10-12
Attending: PHYSICIAN ASSISTANT
Payer: COMMERCIAL

## 2020-10-12 DIAGNOSIS — M54.9 ACUTE MIDLINE BACK PAIN, UNSPECIFIED BACK LOCATION: ICD-10-CM

## 2020-10-12 PROCEDURE — 97110 THERAPEUTIC EXERCISES: CPT

## 2020-10-12 NOTE — OP THERAPY DAILY TREATMENT
Outpatient Physical Therapy  DAILY TREATMENT     [facility] [place] [address] [facility]  [address]  [place] [address]  Phone:  [phone]  Fax:  [phone]    Date: 10/12/2020    Patient: Won Malik  YOB: 1970  MRN: [id number]     Time Calculation    Start time: 0811  Stop time: 0844 Time Calculation (min): 33 minutes         Chief Complaint: Back Problem    Visit #: 2    SUBJECTIVE:  It seemed less irritable over the weekend.     OBJECTIVE:      Therapeutic Exercises (CPT 66759):     1. PPU, 2x10, with PT OP    2. LTR, x 15 ea     3. Piriformis stretch, x 1 min ea    4. TA re-ed with cuff, x 5 min    5. March with cuff, x 2 min, lacks stability with L LE movement    6. BKFO with cuff, x 1 min, good control    7. Ball roll with cuff, x 2 min, initally loss of lumbar lordosis, able to correct with cuing    8. Ball bridge, 2x1 min    9. STS (focus on hip hinge), 2x10      Therapeutic Exercise Summary: Brief manual: DTW L glute and QL. Lumbar  GIII, sacral txn    Access Code: [email or web address]   URL: [email or web address]   Date: 10/12/2020   Prepared by: Yuni Price      Exercises Bridge with Heels on Swiss Ball- 2 sets- 60 sec Hold- 2x daily   Supine Hip and Knee Flexion AROM with Swiss Ball- 20-30 reps- 2x daily   Sit to Stand without Arm Support- 10 reps- 2 sets- 2x daily       Therapeutic Treatments and Modalities:     1. E Stim Unattended (CPT 39433), NT    Time-based treatments/modalities:    Physical Therapy Timed Treatment Charges  Therapeutic exercise minutes (CPT 48654): 33 minutes    ASSESSMENT:   Response to treatment: Improved lumbar ext mobility and decreased TTP of the L lower lumbar.  Good compliance with HEP.  Able to advance to beginning core stab with good tolerance. Does have a ball at home. HEP progressed as above.     PLAN/RECOMMENDATIONS:   Plan for treatment: therapy treatment to continue next visit.  Planned interventions for next visit:  continue with current treatment. Advance to standing TA, functional loading if ready.

## 2020-10-14 ENCOUNTER — APPOINTMENT (OUTPATIENT)
Dept: PHYSICAL THERAPY | Facility: REHABILITATION | Age: 50
End: 2020-10-14
Attending: PHYSICIAN ASSISTANT
Payer: COMMERCIAL

## 2020-10-19 NOTE — OP THERAPY DAILY TREATMENT
Outpatient Physical Therapy  DAILY TREATMENT     [facility] Physical [address].  [address]  [place] [address]  Phone:  [phone]  Fax:  [phone]    Date: 10/20/2020    Patient: Won Malik  YOB: 1970  MRN: [id number]     Time Calculation    Start time: 0915  Stop time: 0954 Time Calculation (min): 39 minutes         Chief Complaint: Back Problem    Visit #: 3    SUBJECTIVE:  I'm feeling about the same overall.  I wasn't sure if I was supposed to keep up with the exercises from the first session along with the ones from the second.     OBJECTIVE:      Therapeutic Exercises (CPT 59834):     1. PPU, 2x10    2. S/L thoracic rotation, x 10 ea side, HEP    3. Roller, alt GH flex, angels and star gazer with pelvic tilts, HEP    4. Quad t/s rotation (HBB), x 10 ea side    5. [place], back to wall for TA brace, L3 x 25, HEP    6. Rows, L3 x 30    7. Alt pullback, L3 x 20 ea    8. Gait with cuing for trunk rotation, x 2 min      Therapeutic Exercise Summary:         Time-based treatments/modalities:    Physical Therapy Timed Treatment Charges  Therapeutic exercise minutes (CPT 16793): 30 minutes    ASSESSMENT:   Response to treatment: Little overall change per pt with previous HEP.  Notable restrictions through t/s, rigid gait with IR shoulder preference.  Focused on enhancing t/s rotational mobility and control. Assess response next visit.   PLAN/RECOMMENDATIONS:   Plan for treatment: therapy treatment to continue next visit.  Planned interventions for next visit: continue with current treatment.

## 2020-10-20 ENCOUNTER — PHYSICAL THERAPY (OUTPATIENT)
Dept: PHYSICAL THERAPY | Facility: REHABILITATION | Age: 50
End: 2020-10-20
Attending: PHYSICIAN ASSISTANT
Payer: COMMERCIAL

## 2020-10-20 DIAGNOSIS — M54.9 ACUTE MIDLINE BACK PAIN, UNSPECIFIED BACK LOCATION: ICD-10-CM

## 2020-10-20 PROCEDURE — 97110 THERAPEUTIC EXERCISES: CPT

## 2020-10-22 ENCOUNTER — APPOINTMENT (OUTPATIENT)
Dept: PHYSICAL THERAPY | Facility: REHABILITATION | Age: 50
End: 2020-10-22
Attending: PHYSICIAN ASSISTANT
Payer: COMMERCIAL

## 2020-10-26 ENCOUNTER — PHYSICAL THERAPY (OUTPATIENT)
Dept: PHYSICAL THERAPY | Facility: REHABILITATION | Age: 50
End: 2020-10-26
Attending: PHYSICIAN ASSISTANT
Payer: COMMERCIAL

## 2020-10-26 DIAGNOSIS — M54.9 ACUTE MIDLINE BACK PAIN, UNSPECIFIED BACK LOCATION: ICD-10-CM

## 2020-10-26 PROCEDURE — 97110 THERAPEUTIC EXERCISES: CPT

## 2020-10-26 NOTE — OP THERAPY DAILY TREATMENT
"  Outpatient Physical Therapy  DAILY TREATMENT     [facility] Physical [address].  [address]  [place] [address]  Phone:  [phone]  Fax:  [phone]    Date: 10/26/2020    Patient: Won Malik  YOB: 1970  MRN: [id number]     Time Calculation    Start time: 0843  Stop time: 0914 Time Calculation (min): 31 minutes         Chief Complaint: Back Problem    Visit #: 4    SUBJECTIVE:  The trunk rotation stretches really seem to be helping. \"I'm stiff today, but it is early.\"    OBJECTIVE:      Therapeutic Exercises (CPT 19549):     1. PPU, 2x10    2. S/L thoracic rotation, x 10 ea side    3. Roller, alt GH flex, angels and star gazer with pelvic tilts    4. Quad t/s rotation (HBB), x 10 ea side    5. [place], back to wall for TA brace, L3 x 25    6. Sahramann hold, x 40\"    7. GH flex with serratus (band pull apart), L3 x 15    8. Wall angels, x 15    9. TRX rows, 2x10, (self assessment after above therex, \"It actually doesn't even hurt.\")    10. Etienne row, L3 x 20 ea side      Time-based treatments/modalities:    Physical Therapy Timed Treatment Charges  Therapeutic exercise minutes (CPT 16209): 31 minutes    ASSESSMENT:   Response to treatment: Notable improvements in t/s rotation and GH elevation AROM.  Weak posterior chain limits ability to hold these new postures, but is receptive to education and showing compliance with HEP    PLAN/RECOMMENDATIONS:   Plan for treatment: therapy treatment to continue next visit.  Planned interventions for next visit: continue with current treatment.       "

## 2020-10-30 ENCOUNTER — APPOINTMENT (OUTPATIENT)
Dept: PHYSICAL THERAPY | Facility: REHABILITATION | Age: 50
End: 2020-10-30
Attending: PHYSICIAN ASSISTANT
Payer: COMMERCIAL

## 2020-10-30 NOTE — OP THERAPY DAILY TREATMENT
Outpatient Physical Therapy  DAILY TREATMENT     [facility] [address]  [address]  [place] [address]  Phone:  [phone]  Fax:  [phone]    Date: 11/02/2020    Patient: Won Malik  YOB: 1970  MRN: [id number]     Time Calculation    Start time: 0918  Stop time: 0946 Time Calculation (min): 28 minutes         Chief Complaint: Back Problem    Visit #: 5    SUBJECTIVE:  I'm feeling about 90% better overall.  Getting a lot more done around my house.     OBJECTIVE:      Therapeutic Exercises (CPT 02327):     1. PPU, 2x10    2. S/L thoracic rotation, x 10 ea side    3. Roller, alt GH flex, angels and star gazer with pelvic tilts. Horiz t/s ext mobs    4. Quad t/s rotation (HBB), x 10 ea side    5. Prone angels, x 20    6. Lifting, re-ed hip hinge and scap setting. 30# box x 10 reps, Re-ed through half kneel for low objects.       Time-based treatments/modalities:    Physical Therapy Timed Treatment Charges  Therapeutic exercise minutes (CPT 12459): 28 minutes    ASSESSMENT:   Response to treatment: Excellent response to combination of t/s rotational mobility, scap stab, ext bias and core stab.  Showing improved functional loading tolerance and good natural hip hinge.  Will follow up in 2 weeks to assess sx control on HEP alone.  Likely will be able to d/c    PLAN/RECOMMENDATIONS:   Plan for treatment: therapy treatment to continue next visit.  Planned interventions for next visit: continue with current treatment.

## 2020-11-02 ENCOUNTER — PHYSICAL THERAPY (OUTPATIENT)
Dept: PHYSICAL THERAPY | Facility: REHABILITATION | Age: 50
End: 2020-11-02
Attending: PHYSICIAN ASSISTANT
Payer: COMMERCIAL

## 2020-11-02 DIAGNOSIS — M54.9 ACUTE MIDLINE BACK PAIN, UNSPECIFIED BACK LOCATION: ICD-10-CM

## 2020-11-02 PROCEDURE — 97110 THERAPEUTIC EXERCISES: CPT

## 2020-11-09 ENCOUNTER — APPOINTMENT (OUTPATIENT)
Dept: PHYSICAL THERAPY | Facility: REHABILITATION | Age: 50
End: 2020-11-09
Attending: PHYSICIAN ASSISTANT
Payer: COMMERCIAL

## 2020-11-17 ENCOUNTER — OFFICE VISIT (OUTPATIENT)
Dept: MEDICAL GROUP | Facility: PHYSICIAN GROUP | Age: 50
End: 2020-11-17
Payer: COMMERCIAL

## 2020-11-17 ENCOUNTER — HOSPITAL ENCOUNTER (OUTPATIENT)
Dept: LAB | Facility: MEDICAL CENTER | Age: 50
End: 2020-11-17
Attending: INTERNAL MEDICINE
Payer: COMMERCIAL

## 2020-11-17 VITALS
SYSTOLIC BLOOD PRESSURE: 122 MMHG | WEIGHT: 282 LBS | TEMPERATURE: 98.6 F | HEART RATE: 82 BPM | BODY MASS INDEX: 41.77 KG/M2 | DIASTOLIC BLOOD PRESSURE: 80 MMHG | HEIGHT: 69 IN

## 2020-11-17 DIAGNOSIS — R73.01 IMPAIRED FASTING GLUCOSE: ICD-10-CM

## 2020-11-17 DIAGNOSIS — M25.562 CHRONIC PAIN OF BOTH KNEES: ICD-10-CM

## 2020-11-17 DIAGNOSIS — M54.50 LUMBAR BACK PAIN: ICD-10-CM

## 2020-11-17 DIAGNOSIS — E66.9 OBESITY (BMI 35.0-39.9 WITHOUT COMORBIDITY): ICD-10-CM

## 2020-11-17 DIAGNOSIS — M25.561 CHRONIC PAIN OF BOTH KNEES: ICD-10-CM

## 2020-11-17 DIAGNOSIS — G89.29 CHRONIC PAIN OF BOTH KNEES: ICD-10-CM

## 2020-11-17 DIAGNOSIS — N47.5 ADHESIONS OF PREPUCE: ICD-10-CM

## 2020-11-17 PROBLEM — R06.02 SHORTNESS OF BREATH ON EXERTION: Status: RESOLVED | Noted: 2018-10-31 | Resolved: 2020-11-17

## 2020-11-17 PROBLEM — R21 RASH AND NONSPECIFIC SKIN ERUPTION: Status: RESOLVED | Noted: 2018-10-31 | Resolved: 2020-11-17

## 2020-11-17 LAB
25(OH)D3 SERPL-MCNC: 15 NG/ML (ref 30–100)
ALBUMIN SERPL BCP-MCNC: 4.7 G/DL (ref 3.2–4.9)
ALBUMIN/GLOB SERPL: 1.6 G/DL
ALP SERPL-CCNC: 68 U/L (ref 30–99)
ALT SERPL-CCNC: 22 U/L (ref 2–50)
ANION GAP SERPL CALC-SCNC: 8 MMOL/L (ref 7–16)
AST SERPL-CCNC: 24 U/L (ref 12–45)
BILIRUB SERPL-MCNC: 0.5 MG/DL (ref 0.1–1.5)
BUN SERPL-MCNC: 10 MG/DL (ref 8–22)
CALCIUM SERPL-MCNC: 9.6 MG/DL (ref 8.5–10.5)
CHLORIDE SERPL-SCNC: 107 MMOL/L (ref 96–112)
CHOLEST SERPL-MCNC: 210 MG/DL (ref 100–199)
CO2 SERPL-SCNC: 28 MMOL/L (ref 20–33)
CREAT SERPL-MCNC: 1.07 MG/DL (ref 0.5–1.4)
CREAT UR-MCNC: 275.57 MG/DL
EST. AVERAGE GLUCOSE BLD GHB EST-MCNC: 134 MG/DL
FOLATE SERPL-MCNC: 14.8 NG/ML
GLOBULIN SER CALC-MCNC: 3 G/DL (ref 1.9–3.5)
GLUCOSE SERPL-MCNC: 123 MG/DL (ref 65–99)
HBA1C MFR BLD: 6.3 % (ref 0–5.6)
HDLC SERPL-MCNC: 43 MG/DL
LDLC SERPL CALC-MCNC: 137 MG/DL
MAGNESIUM SERPL-MCNC: 2.1 MG/DL (ref 1.5–2.5)
MICROALBUMIN UR-MCNC: 10.2 MG/DL
MICROALBUMIN/CREAT UR: 37 MG/G (ref 0–30)
POTASSIUM SERPL-SCNC: 4.5 MMOL/L (ref 3.6–5.5)
PROT SERPL-MCNC: 7.7 G/DL (ref 6–8.2)
SODIUM SERPL-SCNC: 143 MMOL/L (ref 135–145)
TRIGL SERPL-MCNC: 148 MG/DL (ref 0–149)
TSH SERPL DL<=0.005 MIU/L-ACNC: 1.63 UIU/ML (ref 0.38–5.33)
VIT B12 SERPL-MCNC: 429 PG/ML (ref 211–911)

## 2020-11-17 PROCEDURE — 83735 ASSAY OF MAGNESIUM: CPT

## 2020-11-17 PROCEDURE — 80053 COMPREHEN METABOLIC PANEL: CPT

## 2020-11-17 PROCEDURE — 36415 COLL VENOUS BLD VENIPUNCTURE: CPT

## 2020-11-17 PROCEDURE — 82607 VITAMIN B-12: CPT

## 2020-11-17 PROCEDURE — 82306 VITAMIN D 25 HYDROXY: CPT

## 2020-11-17 PROCEDURE — 80061 LIPID PANEL: CPT

## 2020-11-17 PROCEDURE — 82043 UR ALBUMIN QUANTITATIVE: CPT

## 2020-11-17 PROCEDURE — 99204 OFFICE O/P NEW MOD 45 MIN: CPT | Performed by: INTERNAL MEDICINE

## 2020-11-17 PROCEDURE — 82570 ASSAY OF URINE CREATININE: CPT

## 2020-11-17 PROCEDURE — 84443 ASSAY THYROID STIM HORMONE: CPT

## 2020-11-17 PROCEDURE — 83036 HEMOGLOBIN GLYCOSYLATED A1C: CPT

## 2020-11-17 PROCEDURE — 82746 ASSAY OF FOLIC ACID SERUM: CPT

## 2020-11-17 RX ORDER — LIDOCAINE 50 MG/G
1 PATCH TOPICAL EVERY 24 HOURS
Qty: 10 PATCH | Refills: 2 | Status: SHIPPED | OUTPATIENT
Start: 2020-11-17 | End: 2020-12-24

## 2020-11-17 ASSESSMENT — PATIENT HEALTH QUESTIONNAIRE - PHQ9: CLINICAL INTERPRETATION OF PHQ2 SCORE: 0

## 2020-11-17 ASSESSMENT — FIBROSIS 4 INDEX: FIB4 SCORE: 1.043478260869565217

## 2020-11-17 NOTE — PROGRESS NOTES
New Patient to establish    Chief Complaint   Patient presents with   • Establish Care     right knee and back pain        Subjective:     History of Present Illness: Patient is a 50 y.o. male who is here today to establish primary care    1. Obesity (BMI 35.0-39.9 without comorbidity) (HCC)  Chronic, complication from that including joint osteoarthritis of the back and knee joint, he is trying to eat healthier options currently      2. Lumbar back pain  -Chronic, many years, score of pain 2/10, mention worsened with activity, better at rest, he is performing part-time, mention he had a low back pain accident twice with some muscle spasm, currently taking ibuprofen 4 to 6 pills a week, Tylenol same, little bit helpful, he is also performing physical therapy at home    3. Impaired fasting blood sugar  A1c:   Lab Results   Component Value Date/Time    HBA1C 6.0 (H) 08/09/2019 0834    HBA1C 6.6 (H) 10/31/2018 1027    AVGLUC 126 08/09/2019 0834    AVGLUC 143 10/31/2018 1027   At 1 point he was a diabetic range, he is try to eat healthier option    4. Chronic pain of both knees  He is complaining of right knee joint pain, frontal aspect, the pain radiated to the back of the knee, worsened with kneeling of the pain and going up stairs and down stairs    5. Adhesions of prepuce  -Complaining of the above, 3 weeks, denied having any infection or symptoms of UTI or fungal issues, would like to remove the prepuce    Current Outpatient Medications on File Prior to Visit   Medication Sig Dispense Refill   • ibuprofen (MOTRIN) 600 MG Tab Take 1 Tab by mouth every 8 hours as needed. 30 Tab 0   • acetaminophen (TYLENOL) 325 MG Tab Take 650 mg by mouth every 6 hours as needed for Mild Pain.       No current facility-administered medications on file prior to visit.      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 12/19/2019   • Impaired fasting blood sugar 01/02/2019   • Chronic left shoulder pain 10/31/2018   • Chronic pain of both knees 10/31/2018   • Leg swelling 10/31/2018   • Obesity (BMI 35.0-39.9 without comorbidity) ([facility]) 09/05/2018   • Lumbar back pain 09/26/2011     Past Medical History:   Diagnosis Date   • Low back pain 9/26/2011   • Rash and nonspecific skin eruption 10/31/2018    Left lower ext   • Shortness of breath on exertion 10/31/2018     Past Surgical History:   Procedure Laterality Date   • VASECTOMY       Family History   Problem Relation Age of Onset   • No Known Problems Mother    • No Known Problems Father    • Cancer Neg Hx    • Diabetes Neg Hx    • Heart Disease Neg Hx    • Hypertension Neg Hx    • Hyperlipidemia Neg Hx    • Stroke Neg Hx      Social History     Tobacco Use   • Smoking status: Never Smoker   • Smokeless tobacco: Never Used   Substance Use Topics   • Alcohol use: Not Currently     Comment: occasional;   • Drug use: No     ROS:     - Constitutional: Negative for fever, chills,    - Eye: Negative for blurry vision    -ENT: Negative for ear pain    - Respiratory: Negative for cough, hemoptysis    - Cardiovascular: Negative for chest pain     - Gastrointestinal: Negative for abdominal pain    - Genitourinary: Negative for dysuria    - Musculoskeletal: Negative for joint swelling    - Skin: Negative for itching    - Neurological: Negative for focal weakness     - Psychiatric/Behavioral: Negative for depression        Physical Exam:     /80 (BP Location: Left arm, Patient Position: Sitting, BP Cuff Size: Large adult)   Pulse 82   Temp 37 °C (98.6 °F)   Ht 1.753 m (5' 9\")   Wt (!) 127.9 kg (282 lb)   BMI 41.64 kg/m²   General: Normal appearing. No distress.  ENT: oropharynx without exudates.    Eyes: conjunctiva clear lids without ptosis  Pulmonary: Clear to ausculation.  Normal effort.   Cardiovascular: Regular rate and rhythm  Abdomen: Soft, nontender,  Lymph: No " cervical or supraclavicular palpable lymph nodes  Psych: Normal mood and affect.   Extremities: Left knee with mild limitation of range of movement with pain on internal rotation specifically     I have reviewed pertinent labs and diagnostic tests associated with this visit with specific comments listed under the assessment and plan below      Assessment and Plan:       2. Lumbar back pain  Previous imaging of lumbar spine with degenerative disc disease and facet arthropathy especially between L5-S1  -Continue ibuprofen, continue Tylenol as needed, mention he is practicing physical therapy at home, continue albuterol Epsom salt, advised to seen by chiropractor also, as well as orthotic shoes might be helpful for his knee as well  - lidocaine (LIDODERM) 5 % Patch; Place 1 Patch on the skin every 24 hours.  Dispense: 10 Patch; Refill: 2  -If continue to be an issue, might refer to pain clinic      3. Impaired fasting blood sugar  1. Obesity (BMI 35.0-39.9 without comorbidity) ([facility])  Educated regarding healthy lifestyle, healthy diet, lose weight,  - Comp Metabolic Panel; Future  - HEMOGLOBIN A1C; Future  - Lipid Profile; Future  - MICROALBUMIN CREAT RATIO URINE; Future  - MAGNESIUM, RBC; Future  - MAGNESIUM; Future  - TSH WITH REFLEX TO FT4; Future  - VITAMIN D,25 HYDROXY; Future  - VIT B12,  FOLIC ACID    4. Chronic pain of both knees  Physical therapy given, most likely this patellofemoral syndrome,   -Educated regarding conservative management    5. Adhesions of prepuce  - REFERRAL TO UROLOGY>> urgent for the removal of the prepuce, also advised zinc ointment application      Follow Up:      Return in about 4 weeks (around 12/15/2020) for follow up.    Please note that this dictation was created using voice recognition software. I have made every reasonable attempt to correct obvious errors, but I expect that there are errors of grammar and possibly content that I did not discover before finalizing the  note.    Signed by: Dionte Rapp M.D.

## 2020-11-19 LAB — MAGNESIUM RBC-SCNC: 2.2 MMOL/L (ref 1.5–3.1)

## 2020-11-20 DIAGNOSIS — E55.9 VITAMIN D DEFICIENCY: ICD-10-CM

## 2020-11-20 RX ORDER — METHOCARBAMOL 750 MG/1
1 TABLET ORAL
Qty: 8 CAP | Refills: 0 | Status: SHIPPED | OUTPATIENT
Start: 2020-11-20 | End: 2020-12-24 | Stop reason: SDUPTHER

## 2020-11-20 NOTE — PROGRESS NOTES
1. Vitamin D deficiency  - Cholecalciferol (D3-50) 60279 UNIT Cap; Take 1 Cap by mouth every 7 days.  Dispense: 8 Cap; Refill: 0

## 2020-12-11 ENCOUNTER — TELEPHONE (OUTPATIENT)
Dept: PHYSICAL THERAPY | Facility: REHABILITATION | Age: 50
End: 2020-12-11

## 2020-12-11 NOTE — OP THERAPY DISCHARGE SUMMARY
Outpatient Physical Therapy  DISCHARGE SUMMARY NOTE      [address]  [address]  [place] [address]  Phone:  [phone]  Fax:  [phone]    Date of Visit: 12/11/2020    Patient: Won Malik  YOB: 1970  MRN: [id number]     Referring Provider: No referring provider defined for this encounter.   Referring Diagnosis No admission diagnoses are documented for this encounter.         Functional Assessment Used        Your patient is being discharged from Physical Therapy with the following comments:   · Goals met    Comments:  Mr. Malik was seen for 5 PT sessions treating his back pain through progressive mobility and stability exercise.  Pt demonstrated positive response and reported 90% improvement overall at his last visit.  Planned 1 more session for formal d/c, but pt called stating he was feeling good and cancelled the visit. D/C to HEP.      Yuni Price, PT, DPT    Date: 12/11/2020

## 2020-12-22 ENCOUNTER — HOSPITAL ENCOUNTER (OUTPATIENT)
Facility: MEDICAL CENTER | Age: 50
End: 2020-12-22
Attending: UROLOGY | Admitting: UROLOGY
Payer: COMMERCIAL

## 2020-12-24 ENCOUNTER — OFFICE VISIT (OUTPATIENT)
Dept: MEDICAL GROUP | Facility: PHYSICIAN GROUP | Age: 50
End: 2020-12-24
Payer: COMMERCIAL

## 2020-12-24 VITALS
TEMPERATURE: 98.5 F | SYSTOLIC BLOOD PRESSURE: 148 MMHG | OXYGEN SATURATION: 95 % | BODY MASS INDEX: 41.59 KG/M2 | WEIGHT: 280.8 LBS | DIASTOLIC BLOOD PRESSURE: 90 MMHG | HEART RATE: 81 BPM | HEIGHT: 69 IN

## 2020-12-24 DIAGNOSIS — E55.9 VITAMIN D DEFICIENCY: ICD-10-CM

## 2020-12-24 DIAGNOSIS — R73.03 PREDIABETES: ICD-10-CM

## 2020-12-24 DIAGNOSIS — E66.9 OBESITY (BMI 35.0-39.9 WITHOUT COMORBIDITY): ICD-10-CM

## 2020-12-24 PROBLEM — R73.01 IMPAIRED FASTING GLUCOSE: Status: RESOLVED | Noted: 2019-01-02 | Resolved: 2020-12-24

## 2020-12-24 PROCEDURE — 99214 OFFICE O/P EST MOD 30 MIN: CPT | Performed by: INTERNAL MEDICINE

## 2020-12-24 RX ORDER — METHOCARBAMOL 750 MG/1
1 TABLET ORAL
Qty: 12 CAP | Refills: 0 | Status: SHIPPED | OUTPATIENT
Start: 2020-12-24

## 2020-12-24 ASSESSMENT — FIBROSIS 4 INDEX: FIB4 SCORE: 1.33

## 2020-12-24 NOTE — PATIENT INSTRUCTIONS
Natural vitamins from whole foods (fruit and vegetable)  Vitamin B complex supplement (methylcobalamin B12, methyl folate B9).   Magnesium supplement 200 mg daily  Vitamin D3 supplement   Essential oil supplement (cod liver oil)      LIFESTYLE MEDICINE:       1) Make SMART lifestyle changes: The lifestyle changes that you need to make are with regards to: nutrition, cardiovascular exercise, sleep, stress management.         2) Nutrition:     1- Reduce carbohydrates, no added sugar at all, try to avoid hidden carbohydrates (including breads, pasta, cereals/oatmeal).  Avoid soda, processed carbs and sugars including cookies, candies, donuts, jellies, avoid all added sugar beverages including avoidance of diet soda/Gatorade, Powerade, Tim-Aid, sweetened ice tea.  Avoid all the sweeteners.    2- Eat vegetables (organic is much better to avoid herbicide/insecticide): including green leafy vegetables, steamed or cooked with healthy oils such as olive oil, avocado oil or coconut oil.  Avoid vegetable oil (sunflower, soy, corn, canola, sufflower, cottonseed or peanut oil).  Include cruciferous vegetables in your diets such as kale, cabbage, cauliflower, [place] sprouts, broccoli, Microgreens    3-Try to focus on fruits with low glycemic index (less amount of fructose sugar), best fruits that are rich with vitamin/minerals as well as antioxidants are berries (you could take up to 1 cup of Berries a day). Avoid fruit juices ( even worse than SODA).     4-Eat healthy fat and meats from grass fed animals (grass fed cow/lamb meat, grass fed chicken/poultry, wild-caught fish and seafood) as well as pasteurized eggs from grass fed chicken    5-when you eat dairy foods, eat as a whole fat with no added sugar, sweetener or flavors.  You could add berries, nuts or seeds into the yogurt.  Avoid skimmed milk/free fat milk/2% milk    6-Eat when you feel hungry. Avoid meals if you don't feel hungry.    7-1 big glass of water, mix  with  lemon, add 1-2 tablespoon of apple cider vinegar as well as 2 cloves of garlic>> very helpful    8-INTERMITTENT FASTING is very very very powerful           3) Cardiovascular Exercise: The center for disease control recommends a minimum of 150 minutes per week of moderate intensity cardiovascular exercise for weight maintenance and cardiovascular health.  Set this as your initial goal, with at least 30 minutes per session. Types of exercise can include 30 minutes of elliptical, 30 minutes of decently fast jog, 30 minutes of swimming, 30 minutes of heavy gardening (lifting big bags of fertilizer, digging deep holes/ditches).  You can cut down the minute requirements to half, by doing higher intensity sports such as a game of tennis, or soccer.        4) Sleep:    A) Goal: Obtain a minimum of 7-8hours of continuous, uninterrupted, restful sleep per night.    B) Tips for Sleep Hygiene:    I) Go to bed and wake up at consistent times whether work/school day or not.   II) Keep room dark, quiet, and comfortable.  Increase exposure to sunlight during awake times and avoid bright lights (especially anything with a backlight) at least the last 1-2hours before going to sleep.     III) Avoid stimulant or caffeine use in the evening     5) Stress Management: You cannot change the stresses of life necessarily, but you can change how he responds to them. One good way to manage stress is to write things down in order to help you process how to approach things in general or specifically. Another good way is to talk it out with someone you trusts, specifically your significant other or good friend. A definite great way to deal with stress is to have cardiovascular exercise!

## 2020-12-24 NOTE — PROGRESS NOTES
"Established Patient    Chief Complaint   Patient presents with   • Follow-Up       Subjective:     HPI:   Rangel presents today with the following.    2. Prediabetes  3. Obesity (BMI 35.0-39.9 without comorbidity) ([facility])  A1c:   Lab Results   Component Value Date/Time    HBA1C 6.3 (H) 11/17/2020 0843    HBA1C 6.0 (H) 08/09/2019 0834    HBA1C 6.6 (H) 10/31/2018 1027    AVGLUC 134 11/17/2020 0843    AVGLUC 126 08/09/2019 0834    AVGLUC 143 10/31/2018 1027   > Patient A1c is worsening, at one point he was having diabetes, patient has access to some unhealthy sugar and carbs, he is trying to be more physically active and exercise at home and try to adjust his food to more healthier option as well with his wife, patient denies having other related symptoms      Patient Active Problem List    Diagnosis Date Noted   • Prediabetes 12/24/2020   • Vitamin D deficiency 11/20/2020   • Adhesions of prepuce 11/17/2020   • Idiopathic chronic gout of multiple 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 30 Tab 0   • acetaminophen (TYLENOL) 325 MG Tab Take 650 mg by mouth every 6 hours as needed for Mild Pain.       No current facility-administered medications on file prior to visit.        Allergies, past medical history, past surgical history, family history, social history reviewed and updated    ROS:  All other systems reviewed and are negative except as stated in the HPI     Physical Exam:     /90 (BP Location: Right arm, Patient Position: Sitting, BP Cuff Size: Adult)   Pulse 81   Temp 36.9 °C (98.5 °F) (Temporal)   Ht 1.753 m (5' 9\")   Wt (!) 127.4 kg " (280 lb 12.8 oz)   SpO2 95%   BMI 41.47 kg/m²   General: Normal appearing. No distress.  ENT: oropharynx without exudates.    Eyes: conjunctiva clear lids without ptosis  Pulmonary: Clear to ausculation.  Normal effort.   Cardiovascular: Regular rate and rhythm  Abdomen: Soft, nontender,  Lymph: No cervical or supraclavicular palpable lymph nodes  Psych: Normal mood and affect.     I have reviewed pertinent labs and diagnostic tests associated with this visit with specific comments listed under the assessment and plan below      Assessment and Plan:     50 y.o. male with the following issues.    1. Vitamin D deficiency  - Cholecalciferol (D3-50) 06547 UNIT Cap; Take 1 Cap by mouth every 7 days.  Dispense: 12 Cap; Refill: 0    2. Prediabetes  3. Obesity (BMI 35.0-39.9 without comorbidity) ([facility])  -Lengthy discussion regarding healthy dietary options including plenty of vegetable, reduce carb and sugar, regular exercise as tolerated, healthy fat/protein  - Shown multiple pictures and figures in detail regarding healthy lifestyle changes and healthy dietary options  -Patient would like to start these lifestyle changes to improve obesity/overweight as well as other chronic conditions    Follow Up:      Return in about 6 weeks (around 2/4/2021) for follow up.   Prediabetes, obesity, lifestyle  Start    Please note that this dictation was created using voice recognition software. I have made every reasonable attempt to correct obvious errors, but I expect that there are errors of grammar and possibly content that I did not discover before finalizing the note.    Signed by: Dionte Rapp M.D.

## 2020-12-28 ENCOUNTER — PRE-ADMISSION TESTING (OUTPATIENT)
Dept: ADMISSIONS | Facility: MEDICAL CENTER | Age: 50
End: 2020-12-28
Attending: UROLOGY
Payer: COMMERCIAL

## 2020-12-28 VITALS — WEIGHT: 282.19 LBS | BODY MASS INDEX: 41.8 KG/M2 | HEIGHT: 69 IN

## 2020-12-28 DIAGNOSIS — Z01.812 PRE-OPERATIVE LABORATORY EXAMINATION: ICD-10-CM

## 2020-12-28 LAB
COVID ORDER STATUS COVID19: NORMAL
SARS-COV-2 RNA RESP QL NAA+PROBE: DETECTED
SPECIMEN SOURCE: ABNORMAL

## 2020-12-28 PROCEDURE — U0003 INFECTIOUS AGENT DETECTION BY NUCLEIC ACID (DNA OR RNA); SEVERE ACUTE RESPIRATORY SYNDROME CORONAVIRUS 2 (SARS-COV-2) (CORONAVIRUS DISEASE [COVID-19]), AMPLIFIED PROBE TECHNIQUE, MAKING USE OF HIGH THROUGHPUT TECHNOLOGIES AS DESCRIBED BY CMS-2020-01-R: HCPCS

## 2020-12-28 PROCEDURE — C9803 HOPD COVID-19 SPEC COLLECT: HCPCS

## 2020-12-28 RX ORDER — LIDOCAINE 50 MG/G
1 PATCH TOPICAL EVERY 24 HOURS
COMMUNITY
End: 2021-01-25

## 2020-12-28 ASSESSMENT — FIBROSIS 4 INDEX: FIB4 SCORE: 1.33

## 2020-12-30 ENCOUNTER — HOSPITAL ENCOUNTER (OUTPATIENT)
Facility: MEDICAL CENTER | Age: 50
End: 2020-12-30
Attending: UROLOGY | Admitting: UROLOGY
Payer: COMMERCIAL

## 2020-12-30 NOTE — OR NURSING
Dr Collier's surgery scheduler notified that patient tested positive for covid on 12/28/2020. Per surgery scheduler, case will be rescheduled, reminded her to please fax [facility] surgery scheduling with update, and to please also contact patient to update of plan.

## 2020-12-30 NOTE — OR NURSING
The patient is informed that his covid 19 test obtained on 12/28 is positive. He is told to self isolate, given the [facility] phone number and told to seek medical care should he become short of breath or severely symptomatic. Dr. Collier's office will be notified 12/30 AM when the office reopens.

## 2021-01-11 ENCOUNTER — APPOINTMENT (OUTPATIENT)
Dept: ADMISSIONS | Facility: MEDICAL CENTER | Age: 51
End: 2021-01-11
Payer: COMMERCIAL

## 2021-01-11 ENCOUNTER — HOSPITAL ENCOUNTER (OUTPATIENT)
Facility: MEDICAL CENTER | Age: 51
End: 2021-01-11
Attending: UROLOGY | Admitting: UROLOGY
Payer: COMMERCIAL

## 2021-01-25 ENCOUNTER — PRE-ADMISSION TESTING (OUTPATIENT)
Dept: ADMISSIONS | Facility: MEDICAL CENTER | Age: 51
End: 2021-01-25
Attending: UROLOGY
Payer: COMMERCIAL

## 2021-01-25 VITALS — HEIGHT: 69 IN | WEIGHT: 270 LBS | BODY MASS INDEX: 39.99 KG/M2

## 2021-01-25 SDOH — HEALTH STABILITY: MENTAL HEALTH: HOW OFTEN DO YOU HAVE 6 OR MORE DRINKS ON ONE OCCASION?: NEVER

## 2021-01-25 ASSESSMENT — FIBROSIS 4 INDEX: FIB4 SCORE: 1.33

## 2021-01-25 NOTE — PREPROCEDURE INSTRUCTIONS
"Preadmit appointment: \" Preparing for your Procedure information\" sheet given to patient with verbal and written instructions. Patient instructed to continue prescribed medications through the day before surgery, instructed to take the following medications the day of surgery per anesthesia protocol: none  Verbal and written instructions on self isolation until surgery and covid symptoms to watch for given to patient and patient instructed to call MD if any symptoms develop prior to surgery/procedure. Pt tested positive for covid on 12/28/20. Pt denies any symptoms.  Pt denies issues with anesthesia, only had vasectomy.   "

## 2021-02-09 ENCOUNTER — APPOINTMENT (OUTPATIENT)
Dept: MEDICAL GROUP | Facility: PHYSICIAN GROUP | Age: 51
End: 2021-02-09
Payer: COMMERCIAL

## 2021-02-12 ENCOUNTER — APPOINTMENT (OUTPATIENT)
Dept: ADMISSIONS | Facility: MEDICAL CENTER | Age: 51
End: 2021-02-12
Attending: UROLOGY
Payer: COMMERCIAL

## 2021-02-12 DIAGNOSIS — Z01.812 PRE-OPERATIVE LABORATORY EXAMINATION: ICD-10-CM

## 2021-02-14 NOTE — OR NURSING
COVID-19 Pre-surgery screenin. Do you have an undiagnosed respiratory illness or symptoms such as coughing or sneezing? no (Yes/No)  a. Onset of Sx no  b. Acute vs. chronic respiratory illness no    2. Do you have an unexplained fever greater than 100.4 degrees Fahrenheit or 38 degrees Celsius?     no (Yes/No)    3. Have you had direct exposure to a patient who tested positive for Covid-19?    no (Yes/No)    4. Have you had any loss of your sense of taste or smell? Have you had N/V or sore throat? no    Patient has been informed of visitor policy and asked to wear a mask upon entering the hospital   YES (Yes/No)

## 2021-02-15 ENCOUNTER — HOSPITAL ENCOUNTER (OUTPATIENT)
Facility: MEDICAL CENTER | Age: 51
End: 2021-02-15
Attending: UROLOGY | Admitting: UROLOGY
Payer: COMMERCIAL

## 2021-02-15 ENCOUNTER — ANESTHESIA (OUTPATIENT)
Dept: SURGERY | Facility: MEDICAL CENTER | Age: 51
End: 2021-02-15
Payer: COMMERCIAL

## 2021-02-15 ENCOUNTER — ANESTHESIA EVENT (OUTPATIENT)
Dept: SURGERY | Facility: MEDICAL CENTER | Age: 51
End: 2021-02-15
Payer: COMMERCIAL

## 2021-02-15 VITALS
RESPIRATION RATE: 16 BRPM | HEIGHT: 69 IN | HEART RATE: 83 BPM | OXYGEN SATURATION: 94 % | TEMPERATURE: 97.4 F | BODY MASS INDEX: 41.73 KG/M2 | WEIGHT: 281.75 LBS | SYSTOLIC BLOOD PRESSURE: 129 MMHG | DIASTOLIC BLOOD PRESSURE: 77 MMHG

## 2021-02-15 DIAGNOSIS — N47.5 ADHESIONS OF PREPUCE: ICD-10-CM

## 2021-02-15 PROCEDURE — 160048 HCHG OR STATISTICAL LEVEL 1-5: Performed by: UROLOGY

## 2021-02-15 PROCEDURE — A6454 SELF-ADHER BAND W>=3" <5"/YD: HCPCS | Performed by: UROLOGY

## 2021-02-15 PROCEDURE — 160036 HCHG PACU - EA ADDL 30 MINS PHASE I: Performed by: UROLOGY

## 2021-02-15 PROCEDURE — 160046 HCHG PACU - 1ST 60 MINS PHASE II: Performed by: UROLOGY

## 2021-02-15 PROCEDURE — A9270 NON-COVERED ITEM OR SERVICE: HCPCS | Performed by: UROLOGY

## 2021-02-15 PROCEDURE — 160025 RECOVERY II MINUTES (STATS): Performed by: UROLOGY

## 2021-02-15 PROCEDURE — 700111 HCHG RX REV CODE 636 W/ 250 OVERRIDE (IP): Performed by: UROLOGY

## 2021-02-15 PROCEDURE — 700101 HCHG RX REV CODE 250: Performed by: ANESTHESIOLOGY

## 2021-02-15 PROCEDURE — 700101 HCHG RX REV CODE 250: Performed by: UROLOGY

## 2021-02-15 PROCEDURE — 700102 HCHG RX REV CODE 250 W/ 637 OVERRIDE(OP): Performed by: UROLOGY

## 2021-02-15 PROCEDURE — 700105 HCHG RX REV CODE 258: Performed by: UROLOGY

## 2021-02-15 PROCEDURE — 160027 HCHG SURGERY MINUTES - 1ST 30 MINS LEVEL 2: Performed by: UROLOGY

## 2021-02-15 PROCEDURE — 160038 HCHG SURGERY MINUTES - EA ADDL 1 MIN LEVEL 2: Performed by: UROLOGY

## 2021-02-15 PROCEDURE — 160009 HCHG ANES TIME/MIN: Performed by: UROLOGY

## 2021-02-15 PROCEDURE — 700111 HCHG RX REV CODE 636 W/ 250 OVERRIDE (IP): Performed by: ANESTHESIOLOGY

## 2021-02-15 PROCEDURE — 160035 HCHG PACU - 1ST 60 MINS PHASE I: Performed by: UROLOGY

## 2021-02-15 PROCEDURE — 160002 HCHG RECOVERY MINUTES (STAT): Performed by: UROLOGY

## 2021-02-15 PROCEDURE — 501838 HCHG SUTURE GENERAL: Performed by: UROLOGY

## 2021-02-15 RX ORDER — BUPIVACAINE HYDROCHLORIDE 2.5 MG/ML
INJECTION, SOLUTION EPIDURAL; INFILTRATION; INTRACAUDAL
Status: DISCONTINUED | OUTPATIENT
Start: 2021-02-15 | End: 2021-02-15 | Stop reason: HOSPADM

## 2021-02-15 RX ORDER — DEXAMETHASONE SODIUM PHOSPHATE 4 MG/ML
INJECTION, SOLUTION INTRA-ARTICULAR; INTRALESIONAL; INTRAMUSCULAR; INTRAVENOUS; SOFT TISSUE PRN
Status: DISCONTINUED | OUTPATIENT
Start: 2021-02-15 | End: 2021-02-15 | Stop reason: SURG

## 2021-02-15 RX ORDER — LIDOCAINE HYDROCHLORIDE 20 MG/ML
INJECTION, SOLUTION EPIDURAL; INFILTRATION; INTRACAUDAL; PERINEURAL PRN
Status: DISCONTINUED | OUTPATIENT
Start: 2021-02-15 | End: 2021-02-15 | Stop reason: SURG

## 2021-02-15 RX ORDER — CEFAZOLIN SODIUM 1 G/3ML
INJECTION, POWDER, FOR SOLUTION INTRAMUSCULAR; INTRAVENOUS PRN
Status: DISCONTINUED | OUTPATIENT
Start: 2021-02-15 | End: 2021-02-15 | Stop reason: HOSPADM

## 2021-02-15 RX ORDER — HYDROMORPHONE HYDROCHLORIDE 1 MG/ML
0.1 INJECTION, SOLUTION INTRAMUSCULAR; INTRAVENOUS; SUBCUTANEOUS
Status: DISCONTINUED | OUTPATIENT
Start: 2021-02-15 | End: 2021-02-15 | Stop reason: HOSPADM

## 2021-02-15 RX ORDER — HYDROMORPHONE HYDROCHLORIDE 1 MG/ML
0.4 INJECTION, SOLUTION INTRAMUSCULAR; INTRAVENOUS; SUBCUTANEOUS
Status: DISCONTINUED | OUTPATIENT
Start: 2021-02-15 | End: 2021-02-15 | Stop reason: HOSPADM

## 2021-02-15 RX ORDER — OXYCODONE HYDROCHLORIDE AND ACETAMINOPHEN 5; 325 MG/1; MG/1
1 TABLET ORAL EVERY 4 HOURS PRN
Qty: 12 TABLET | Refills: 0 | Status: SHIPPED | OUTPATIENT
Start: 2021-02-15 | End: 2021-02-18

## 2021-02-15 RX ORDER — MEPERIDINE HYDROCHLORIDE 25 MG/ML
12.5 INJECTION INTRAMUSCULAR; INTRAVENOUS; SUBCUTANEOUS
Status: DISCONTINUED | OUTPATIENT
Start: 2021-02-15 | End: 2021-02-15 | Stop reason: HOSPADM

## 2021-02-15 RX ORDER — HYDROMORPHONE HYDROCHLORIDE 1 MG/ML
0.2 INJECTION, SOLUTION INTRAMUSCULAR; INTRAVENOUS; SUBCUTANEOUS
Status: DISCONTINUED | OUTPATIENT
Start: 2021-02-15 | End: 2021-02-15 | Stop reason: HOSPADM

## 2021-02-15 RX ORDER — OXYCODONE HCL 5 MG/5 ML
10 SOLUTION, ORAL ORAL
Status: DISCONTINUED | OUTPATIENT
Start: 2021-02-15 | End: 2021-02-15 | Stop reason: HOSPADM

## 2021-02-15 RX ORDER — HALOPERIDOL 5 MG/ML
1 INJECTION INTRAMUSCULAR
Status: DISCONTINUED | OUTPATIENT
Start: 2021-02-15 | End: 2021-02-15 | Stop reason: HOSPADM

## 2021-02-15 RX ORDER — ACETAMINOPHEN 500 MG
1000 TABLET ORAL EVERY 6 HOURS PRN
Status: ON HOLD | COMMUNITY
End: 2021-02-15

## 2021-02-15 RX ORDER — SODIUM CHLORIDE, SODIUM LACTATE, POTASSIUM CHLORIDE, CALCIUM CHLORIDE 600; 310; 30; 20 MG/100ML; MG/100ML; MG/100ML; MG/100ML
INJECTION, SOLUTION INTRAVENOUS CONTINUOUS
Status: ACTIVE | OUTPATIENT
Start: 2021-02-15 | End: 2021-02-15

## 2021-02-15 RX ORDER — ONDANSETRON 2 MG/ML
INJECTION INTRAMUSCULAR; INTRAVENOUS PRN
Status: DISCONTINUED | OUTPATIENT
Start: 2021-02-15 | End: 2021-02-15 | Stop reason: SURG

## 2021-02-15 RX ORDER — BACITRACIN ZINC 500 [USP'U]/G
OINTMENT TOPICAL
Status: DISCONTINUED | OUTPATIENT
Start: 2021-02-15 | End: 2021-02-15 | Stop reason: HOSPADM

## 2021-02-15 RX ORDER — ONDANSETRON 2 MG/ML
4 INJECTION INTRAMUSCULAR; INTRAVENOUS
Status: DISCONTINUED | OUTPATIENT
Start: 2021-02-15 | End: 2021-02-15 | Stop reason: HOSPADM

## 2021-02-15 RX ORDER — SODIUM CHLORIDE, SODIUM LACTATE, POTASSIUM CHLORIDE, CALCIUM CHLORIDE 600; 310; 30; 20 MG/100ML; MG/100ML; MG/100ML; MG/100ML
INJECTION, SOLUTION INTRAVENOUS CONTINUOUS
Status: DISCONTINUED | OUTPATIENT
Start: 2021-02-15 | End: 2021-02-15 | Stop reason: HOSPADM

## 2021-02-15 RX ORDER — DIPHENHYDRAMINE HYDROCHLORIDE 50 MG/ML
12.5 INJECTION INTRAMUSCULAR; INTRAVENOUS
Status: DISCONTINUED | OUTPATIENT
Start: 2021-02-15 | End: 2021-02-15 | Stop reason: HOSPADM

## 2021-02-15 RX ORDER — OXYCODONE HCL 5 MG/5 ML
5 SOLUTION, ORAL ORAL
Status: DISCONTINUED | OUTPATIENT
Start: 2021-02-15 | End: 2021-02-15 | Stop reason: HOSPADM

## 2021-02-15 RX ADMIN — PROPOFOL 200 MG: 10 INJECTION, EMULSION INTRAVENOUS at 10:51

## 2021-02-15 RX ADMIN — LIDOCAINE HYDROCHLORIDE 100 MG: 20 INJECTION, SOLUTION EPIDURAL; INFILTRATION; INTRACAUDAL at 10:51

## 2021-02-15 RX ADMIN — ONDANSETRON 4 MG: 2 INJECTION INTRAMUSCULAR; INTRAVENOUS at 11:34

## 2021-02-15 RX ADMIN — DEXAMETHASONE SODIUM PHOSPHATE 8 MG: 4 INJECTION, SOLUTION INTRA-ARTICULAR; INTRALESIONAL; INTRAMUSCULAR; INTRAVENOUS; SOFT TISSUE at 10:51

## 2021-02-15 RX ADMIN — MIDAZOLAM 2 MG: 1 INJECTION INTRAMUSCULAR; INTRAVENOUS at 10:43

## 2021-02-15 RX ADMIN — POVIDONE IODINE 15 ML: 100 SOLUTION TOPICAL at 08:27

## 2021-02-15 RX ADMIN — CEFAZOLIN 3 G: 330 INJECTION, POWDER, FOR SOLUTION INTRAMUSCULAR; INTRAVENOUS at 11:01

## 2021-02-15 RX ADMIN — FENTANYL CITRATE 100 MCG: 50 INJECTION, SOLUTION INTRAMUSCULAR; INTRAVENOUS at 10:54

## 2021-02-15 RX ADMIN — SODIUM CHLORIDE, POTASSIUM CHLORIDE, SODIUM LACTATE AND CALCIUM CHLORIDE: 600; 310; 30; 20 INJECTION, SOLUTION INTRAVENOUS at 08:38

## 2021-02-15 ASSESSMENT — FIBROSIS 4 INDEX: FIB4 SCORE: 1.33

## 2021-02-15 ASSESSMENT — PAIN DESCRIPTION - PAIN TYPE: TYPE: SURGICAL PAIN

## 2021-02-15 NOTE — ANESTHESIA PREPROCEDURE EVALUATION
Relevant Problems   Other   (+) Idiopathic chronic gout of multiple sites without tophus       Physical Exam    Airway   Mallampati: II  TM distance: >3 FB  Neck ROM: full       Cardiovascular - normal exam  Rhythm: regular  Rate: normal  (-) murmur     Dental - normal exam           Pulmonary - normal exam  Breath sounds clear to auscultation     Abdominal    Neurological - normal exam                 Anesthesia Plan    ASA 3       Plan - general       Airway plan will be LMA          Induction: intravenous    Postoperative Plan: Postoperative administration of opioids is intended.    Pertinent diagnostic labs and testing reviewed    Informed Consent:    Anesthetic plan and risks discussed with patient.    Use of blood products discussed with: patient whom consented to blood products.

## 2021-02-15 NOTE — ANESTHESIA PROCEDURE NOTES
Airway    Date/Time: 2/15/2021 10:52 AM  Performed by: Tobey Gansert, M.D.  Authorized by: Tobey Gansert, M.D.     Location:  OR  Urgency:  Elective  Indications for Airway Management:  Anesthesia      Spontaneous Ventilation: absent    Sedation Level:  Deep  Preoxygenated: Yes    Final Airway Type:  Supraglottic airway  Final Supraglottic Airway:  Standard LMA    SGA Size:  5  Number of Attempts at Approach:  1

## 2021-02-15 NOTE — DISCHARGE INSTRUCTIONS
ACTIVITY: Rest and take it easy for the first 24 hours.  A responsible adult is recommended to remain with you during that time.  It is normal to feel sleepy.  We encourage you to not do anything that requires balance, judgment or coordination.    MILD FLU-LIKE SYMPTOMS ARE NORMAL. YOU MAY EXPERIENCE GENERALIZED MUSCLE ACHES, THROAT IRRITATION, HEADACHE AND/OR SOME NAUSEA.    FOR 24 HOURS DO NOT:  Drive, operate machinery or run household appliances.  Drink beer or alcoholic beverages.   Make important decisions or sign legal documents.    SPECIAL INSTRUCTIONS: Call if fever >101F or if pain not controlled with medications.  Remove dressing in 24 hrs, then apply antibiotic ointment twice daily for 1 week    DIET: To avoid nausea, slowly advance diet as tolerated, avoiding spicy or greasy foods for the first day.  Add more substantial food to your diet according to your physician's instructions.   INCREASE FLUIDS AND FIBER TO AVOID CONSTIPATION.    SURGICAL DRESSING/BATHING: Remove dressing in 24 hrs, then apply antibiotic ointment twice daily for 1 week      FOLLOW-UP APPOINTMENT:  A follow-up appointment should be arranged with your doctor in 1-2 weeks; call to schedule.    You should CALL YOUR PHYSICIAN if you develop:  Fever greater than 101 degrees F.  Pain not relieved by medication, or persistent nausea or vomiting.  Excessive bleeding (blood soaking through dressing) or unexpected drainage from the wound.  Extreme redness or swelling around the incision site, drainage of pus or foul smelling drainage.  Inability to urinate or empty your bladder within 8 hours.  Problems with breathing or chest pain.    You should call 911 if you develop problems with breathing or chest pain.    If you are unable to contact your doctor or surgical center, you should go to the nearest emergency room or urgent care center.  Physician's telephone #:  Dr. Collier [phone]    If any questions arise, call your doctor.  If your  doctor is not available, please feel free to call the Surgical Center at [phone]. The Contact Center is open Monday through Friday 7AM to 5PM and may speak to a nurse at [phone], or toll free at [phone].     A registered nurse may call you a few days after your surgery to see how you are doing after your procedure.    MEDICATIONS: Resume taking daily medication.  Take prescribed pain medication with food.  If no medication is prescribed, you may take non-aspirin pain medication if needed.  PAIN MEDICATION CAN BE VERY CONSTIPATING.  Take a stool softener or laxative such as senokot, pericolace, or milk of magnesia if needed.    Prescription given for percocet sent to pharmacy on [address]  No oral  pain medication given .    If your physician has prescribed pain medication that includes Acetaminophen (Tylenol), do not take additional Acetaminophen (Tylenol) while taking the prescribed medication.    Depression / Suicide Risk    As you are discharged from this [facility] facility, it is important to learn how to keep safe from harming yourself.    Recognize the warning signs:  · Abrupt changes in personality, positive or negative- including increase in energy   · Giving away possessions  · Change in eating patterns- significant weight changes-  positive or negative  · Change in sleeping patterns- unable to sleep or sleeping all the time   · Unwillingness or inability to communicate  · Depression  · Unusual sadness, discouragement and loneliness  · Talk of wanting to die  · Neglect of personal appearance   · Rebelliousness- reckless behavior  · Withdrawal from people/activities they love  · Confusion- inability to concentrate     If you or a loved one observes any of these behaviors or has concerns about self-harm, here's what you can do:  · Talk about it- your feelings and reasons for harming yourself  · Remove any means that you might use to hurt yourself (examples: pills, rope, extension cords,  firearm)  · Get professional help from the community (Mental Health, Substance Abuse, psychological counseling)  · Do not be alone:Call your Safe Contact- someone whom you trust who will be there for you.  · Call your local CRISIS HOTLINE 066-7229 or 629-699-6163  · Call your local Children's Mobile Crisis Response Team [place] (227) 504-5805 or www.[employer]  · Call the toll free National Suicide Prevention Hotlines   · National Suicide Prevention Lifeline 186-091-CAPI (3317)  · National Hope Line Network 800-SUICIDE (160-4304)

## 2021-02-15 NOTE — OP REPORT
DATE OF SERVICE:  02/15/2021     PREOPERATIVE DIAGNOSIS:  Phimosis and balanitis xerotica obliterans.     POSTOPERATIVE DIAGNOSIS:  Phimosis and balanitis xerotica obliterans.     PROCEDURE PERFORMED:  Circumcision.     SURGEON:  Ashok Collier MD     ANESTHESIOLOGIST:  Tobey B. Gansert, MD     ANESTHESIA:  General.     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  After discussing treatment options with the patient, he elected   for a circumcision.     DESCRIPTION OF PROCEDURE:  After obtaining informed consent, the patient was   brought to the operating room.  After the induction of general anesthesia, he   was placed in supine position and the genitalia were prepped and draped in   sterile fashion.  He received Ancef as antibiotic prophylaxis prior to start   of the procedure and performed a deep dorsal and circumferential penile block   using 0.25% Marcaine plain.  A total of 20 mL was used.  After this, a line   was marked circumferentially around the penis, marking on the back side of the   incision.  I tried to retract the foreskin, it was too tight from the   phimosis and the BXO, so that I did a dorsal slit and then I was able to   retract and clean the undersurface of the foreskin and the glans and marked   the distal line.  The two lines were then incised using a scalpel and the   foreskin was removed using Metzenbaum scissors and Bovie cautery, which was   also used to obtain hemostasis.  The wound edges were then reapproximated   using 3-0 chromic in interrupted fashion.  The area was then cleaned and   dried.  Antibiotic ointment, Vaseline gauze, mini Kerlix and a Coban were   placed for dressing.  The patient was then awoken from anesthesia and taken to   the recovery room in stable condition.     COMPLICATIONS:  None.     ESTIMATED BLOOD LOSS:  20 mL     SPECIMENS:  None.     DRAINS:   None.        ______________________________  MD LUL Cintron    DD:  02/15/2021 11:40  DT:  02/15/2021 11:59    Job#:  [id number]

## 2021-02-15 NOTE — ANESTHESIA TIME REPORT
Anesthesia Start and Stop Event Times     Date Time Event    2/15/2021 1020 Ready for Procedure     1046 Anesthesia Start     1138 Anesthesia Stop        Responsible Staff  02/15/21    Name Role Begin End    Tobey Gansert, M.D. Anesth 1046 1138        Preop Diagnosis (Free Text):  Pre-op Diagnosis     PHIMOSIS        Preop Diagnosis (Codes):    Post op Diagnosis  Phimosis      Premium Reason  Non-Premium    Comments:

## 2021-02-15 NOTE — OR SURGEON
Immediate Post OP Note    PreOp Diagnosis: phimosis, BXO    PostOp Diagnosis: same    Procedure(s):  CIRCUMCISION, ADULT - Wound Class: Clean    Surgeon(s):  Ashok Collier M.D.    Anesthesiologist/Type of Anesthesia:  Anesthesiologist: Tobey Gansert, M.D./General    Surgical Staff:  Circulator: Jorge A Marin R.N.  Relief Circulator: Katarina Flowers R.N.  Relief Scrub: Talon Dick  Scrub Person: Dar Zelaya    Specimens removed if any:  * No specimens in log *    Estimated Blood Loss: 20ml    Findings: tight phimosis from BXO    Complications: none    2/15/2021 11:35 AM Ashok Collier M.D.

## 2021-02-15 NOTE — ANESTHESIA POSTPROCEDURE EVALUATION
Patient: Rangel Malik    Procedure Summary     Date: 02/15/21 Room / Location: [place] [id number] / SURGERY [facility]    Anesthesia Start: 1046 Anesthesia Stop: 1138    Procedure: CIRCUMCISION, ADULT (N/A Penis) Diagnosis: (PHIMOSIS)    Surgeons: Ashok Collier M.D. Responsible Provider: Tobey Gansert, M.D.    Anesthesia Type: general ASA Status: 3          Final Anesthesia Type: general  Last vitals  BP   Blood Pressure: 129/77    Temp   36.3 °C (97.4 °F)    Pulse   83   Resp   16    SpO2   94 %      Anesthesia Post Evaluation    Patient location during evaluation: PACU  Patient participation: complete - patient participated  Level of consciousness: awake and alert    Airway patency: patent  Anesthetic complications: no  Cardiovascular status: hemodynamically stable  Respiratory status: acceptable  Hydration status: euvolemic    PONV: none          No complications documented.     Nurse Pain Score: 0 (NPRS)

## 2021-02-15 NOTE — OR NURSING
1137pt arrived from OR. Report received. oral airway in place. Pt requiring jaw thrust to maintain patency. On 8L mask.  Dressing to penis with guaze and coban CDI.     1211oral airway d/c'd. Pt awake. Denies pain and nausea. Tolerating PO fluids.     1215 called pt wife Argenis to provide updates. Pt placed on 2L NC, eating Jello and chips.     1231 pt weaned to room air.     1244 pt meets phase 2 criteria. Report called to discharge RN. Pt over to phase 2 and up to recliner.

## 2021-02-15 NOTE — OR NURSING
D/c orders received. IV dc'd. Pt changed into clothing with assistance. Discharge instructions given as well as pain management handout; pt and family verbalized understanding and questions answered. Patient states ready to d/c home. No prescriptions given. Pt dc'd in w/c with RN.

## 2021-06-07 ENCOUNTER — OFFICE VISIT (OUTPATIENT)
Dept: URGENT CARE | Facility: PHYSICIAN GROUP | Age: 51
End: 2021-06-07
Payer: COMMERCIAL

## 2021-06-07 VITALS
HEART RATE: 95 BPM | SYSTOLIC BLOOD PRESSURE: 120 MMHG | HEIGHT: 69 IN | BODY MASS INDEX: 40.73 KG/M2 | WEIGHT: 275 LBS | DIASTOLIC BLOOD PRESSURE: 74 MMHG | TEMPERATURE: 97.4 F | OXYGEN SATURATION: 97 % | RESPIRATION RATE: 16 BRPM

## 2021-06-07 DIAGNOSIS — R21 RASH AND NONSPECIFIC SKIN ERUPTION: ICD-10-CM

## 2021-06-07 PROCEDURE — 99214 OFFICE O/P EST MOD 30 MIN: CPT | Performed by: PHYSICIAN ASSISTANT

## 2021-06-07 RX ORDER — CEPHALEXIN 500 MG/1
500 CAPSULE ORAL 4 TIMES DAILY
Qty: 20 CAPSULE | Refills: 0 | Status: SHIPPED | OUTPATIENT
Start: 2021-06-07 | End: 2021-06-12

## 2021-06-07 ASSESSMENT — ENCOUNTER SYMPTOMS
SHORTNESS OF BREATH: 0
VOMITING: 0
TINGLING: 0
COUGH: 0
NAUSEA: 0
CHILLS: 0
FEVER: 0

## 2021-06-07 ASSESSMENT — FIBROSIS 4 INDEX: FIB4 SCORE: 1.33

## 2021-06-07 NOTE — PROGRESS NOTES
"Subjective:   Rangel Malik  is a 50 y.o. male who presents for Rash (L lower leg red/pink , burning pain, x5 years patient states its got worse, x4 days )      Rash  Pertinent negatives include no cough, fever, joint pain, shortness of breath or vomiting.   Patient presents urgent care noting chronic rash to the inside of left leg its been present for the last approximate 5 years.  He notes periodic \"flareups\" that have cropped up over the years since.  He notes last 4 to 5 days with slight increase in redness and irritation with slight burning pain on inside of left lower leg and ankle.  Patient denies significant swelling to calves but does note some mild fluid collection feet bilaterally particularly after working longer shifts on his feet.  He did have a temporary job working as a  and spent long hours standing over this last weekend.  He noted increasing redness to this area of her skin on inner side of left ankle after being on his feet more.  He describes slight increase in irritation and itch to this area as well.  Notes over the last 24 to 48 hours has seen significant improvement in redness.  Notes nearly completely resolved at this time but did want it evaluated.  Patient denies treatments tried.  He states his wife has compression stockings he intends to use but has not yet.    Review of Systems   Constitutional: Negative for chills and fever.   Respiratory: Negative for cough and shortness of breath.    Gastrointestinal: Negative for nausea and vomiting.   Musculoskeletal: Negative for joint pain.   Skin: Positive for rash. Negative for itching.   Neurological: Negative for tingling.       No Known Allergies     Objective:   /74 (BP Location: Right arm, Patient Position: Sitting, BP Cuff Size: Large adult)   Pulse 95   Temp 36.3 °C (97.4 °F) (Temporal)   Resp 16   Ht 1.753 m (5' 9\")   Wt 125 kg (275 lb)   SpO2 97%   BMI 40.61 kg/m²     Physical Exam  Vitals and nursing " note reviewed.   Constitutional:       General: He is not in acute distress.     Appearance: He is well-developed. He is not diaphoretic.   HENT:      Head: Normocephalic and atraumatic.      Right Ear: External ear normal.      Left Ear: External ear normal.      Nose: Nose normal.   Eyes:      General: No scleral icterus.        Right eye: No discharge.         Left eye: No discharge.      Conjunctiva/sclera: Conjunctivae normal.   Pulmonary:      Effort: Pulmonary effort is normal. No respiratory distress.   Musculoskeletal:         General: Normal range of motion.      Cervical back: Neck supple.      Right lower leg: No swelling. No edema.      Left lower leg: No swelling. No edema.        Legs:       Comments: Erythematous rash on inside of left ankle, dusky, nonvesicular, nonulcerative, nonpustular, skin intact throughout, patient reports minimal change in current appearance for years   Skin:     General: Skin is warm and dry.      Coloration: Skin is not pale.   Neurological:      Mental Status: He is alert and oriented to person, place, and time.      Coordination: Coordination normal.         Assessment/Plan:   1. Rash and nonspecific skin eruption  - cephALEXin (KEFLEX) 500 MG Cap; Take 1 capsule by mouth 4 times a day for 5 days.  Dispense: 20 capsule; Refill: 0    Other orders  - VITAMIN D PO; Take  by mouth.  Supportive care is reviewed with patient/caregiver - recommend to push PO fluids and electrolytes, discussed the possibility of contribution of lower extremity edema to rash, recommendation for elevation, compression stockings particularly with long hours on her feet or ambulating, encouraged to trend with level of activity and edema, per patient request sent with contingent Rx for antibiotic coverage, if filling,  take full course of Rx, take with probiotics, observe for resolution  Return to clinic with lack of resolution or progression of symptoms.      I have worn an N95 mask, gloves and eye  protection for the entire encounter with this patient.     Differential diagnosis, natural history, supportive care, and indications for immediate follow-up discussed.

## 2023-06-14 ENCOUNTER — DOCUMENTATION (OUTPATIENT)
Dept: HEALTH INFORMATION MANAGEMENT | Facility: OTHER | Age: 53
End: 2023-06-14
Payer: COMMERCIAL

## 2023-06-14 ENCOUNTER — PATIENT MESSAGE (OUTPATIENT)
Dept: HEALTH INFORMATION MANAGEMENT | Facility: OTHER | Age: 53
End: 2023-06-14

## 2023-08-07 ENCOUNTER — TELEPHONE (OUTPATIENT)
Dept: HEALTH INFORMATION MANAGEMENT | Facility: OTHER | Age: 53
End: 2023-08-07
Payer: COMMERCIAL

## 2024-02-08 ENCOUNTER — HOSPITAL ENCOUNTER (EMERGENCY)
Facility: MEDICAL CENTER | Age: 54
End: 2024-02-08
Attending: EMERGENCY MEDICINE
Payer: COMMERCIAL

## 2024-02-08 ENCOUNTER — OFFICE VISIT (OUTPATIENT)
Dept: URGENT CARE | Facility: PHYSICIAN GROUP | Age: 54
End: 2024-02-08
Payer: COMMERCIAL

## 2024-02-08 VITALS
BODY MASS INDEX: 34.71 KG/M2 | TEMPERATURE: 98 F | SYSTOLIC BLOOD PRESSURE: 127 MMHG | HEART RATE: 89 BPM | WEIGHT: 235.01 LBS | DIASTOLIC BLOOD PRESSURE: 80 MMHG | RESPIRATION RATE: 17 BRPM | OXYGEN SATURATION: 93 %

## 2024-02-08 VITALS
OXYGEN SATURATION: 99 % | WEIGHT: 235 LBS | RESPIRATION RATE: 18 BRPM | TEMPERATURE: 98.4 F | BODY MASS INDEX: 34.8 KG/M2 | HEART RATE: 103 BPM | DIASTOLIC BLOOD PRESSURE: 72 MMHG | HEIGHT: 69 IN | SYSTOLIC BLOOD PRESSURE: 126 MMHG

## 2024-02-08 DIAGNOSIS — J36 PERITONSILLAR ABSCESS: ICD-10-CM

## 2024-02-08 DIAGNOSIS — J36 TONSILLAR ABSCESS: ICD-10-CM

## 2024-02-08 DIAGNOSIS — R73.9 HYPERGLYCEMIA: ICD-10-CM

## 2024-02-08 DIAGNOSIS — J02.9 SORE THROAT: ICD-10-CM

## 2024-02-08 LAB
ALBUMIN SERPL BCP-MCNC: 4.1 G/DL (ref 3.2–4.9)
ALBUMIN/GLOB SERPL: 1 G/DL
ALP SERPL-CCNC: 82 U/L (ref 30–99)
ALT SERPL-CCNC: 8 U/L (ref 2–50)
ANION GAP SERPL CALC-SCNC: 21 MMOL/L (ref 7–16)
AST SERPL-CCNC: 10 U/L (ref 12–45)
BASOPHILS # BLD AUTO: 0.2 % (ref 0–1.8)
BASOPHILS # BLD: 0.02 K/UL (ref 0–0.12)
BILIRUB SERPL-MCNC: 0.5 MG/DL (ref 0.1–1.5)
BUN SERPL-MCNC: 5 MG/DL (ref 8–22)
CALCIUM ALBUM COR SERPL-MCNC: 9.6 MG/DL (ref 8.5–10.5)
CALCIUM SERPL-MCNC: 9.7 MG/DL (ref 8.5–10.5)
CHLORIDE SERPL-SCNC: 100 MMOL/L (ref 96–112)
CO2 SERPL-SCNC: 19 MMOL/L (ref 20–33)
CREAT SERPL-MCNC: 0.75 MG/DL (ref 0.5–1.4)
EOSINOPHIL # BLD AUTO: 0.13 K/UL (ref 0–0.51)
EOSINOPHIL NFR BLD: 1.5 % (ref 0–6.9)
ERYTHROCYTE [DISTWIDTH] IN BLOOD BY AUTOMATED COUNT: 41.1 FL (ref 35.9–50)
GFR SERPLBLD CREATININE-BSD FMLA CKD-EPI: 109 ML/MIN/1.73 M 2
GLOBULIN SER CALC-MCNC: 4.1 G/DL (ref 1.9–3.5)
GLUCOSE SERPL-MCNC: 206 MG/DL (ref 65–99)
HCT VFR BLD AUTO: 54.6 % (ref 42–52)
HGB BLD-MCNC: 18.5 G/DL (ref 14–18)
IMM GRANULOCYTES # BLD AUTO: 0.03 K/UL (ref 0–0.11)
IMM GRANULOCYTES NFR BLD AUTO: 0.3 % (ref 0–0.9)
LYMPHOCYTES # BLD AUTO: 1.41 K/UL (ref 1–4.8)
LYMPHOCYTES NFR BLD: 16.4 % (ref 22–41)
MCH RBC QN AUTO: 30 PG (ref 27–33)
MCHC RBC AUTO-ENTMCNC: 33.9 G/DL (ref 32.3–36.5)
MCV RBC AUTO: 88.5 FL (ref 81.4–97.8)
MONOCYTES # BLD AUTO: 0.57 K/UL (ref 0–0.85)
MONOCYTES NFR BLD AUTO: 6.6 % (ref 0–13.4)
NEUTROPHILS # BLD AUTO: 6.45 K/UL (ref 1.82–7.42)
NEUTROPHILS NFR BLD: 75 % (ref 44–72)
NRBC # BLD AUTO: 0 K/UL
NRBC BLD-RTO: 0 /100 WBC (ref 0–0.2)
PLATELET # BLD AUTO: 200 K/UL (ref 164–446)
PMV BLD AUTO: 11.2 FL (ref 9–12.9)
POTASSIUM SERPL-SCNC: 3.9 MMOL/L (ref 3.6–5.5)
PROT SERPL-MCNC: 8.2 G/DL (ref 6–8.2)
RBC # BLD AUTO: 6.17 M/UL (ref 4.7–6.1)
SODIUM SERPL-SCNC: 140 MMOL/L (ref 135–145)
WBC # BLD AUTO: 8.6 K/UL (ref 4.8–10.8)

## 2024-02-08 PROCEDURE — A9270 NON-COVERED ITEM OR SERVICE: HCPCS | Performed by: EMERGENCY MEDICINE

## 2024-02-08 PROCEDURE — 3078F DIAST BP <80 MM HG: CPT | Performed by: PHYSICIAN ASSISTANT

## 2024-02-08 PROCEDURE — 99284 EMERGENCY DEPT VISIT MOD MDM: CPT

## 2024-02-08 PROCEDURE — 96375 TX/PRO/DX INJ NEW DRUG ADDON: CPT

## 2024-02-08 PROCEDURE — 3074F SYST BP LT 130 MM HG: CPT | Performed by: PHYSICIAN ASSISTANT

## 2024-02-08 PROCEDURE — 36415 COLL VENOUS BLD VENIPUNCTURE: CPT

## 2024-02-08 PROCEDURE — 700102 HCHG RX REV CODE 250 W/ 637 OVERRIDE(OP): Performed by: EMERGENCY MEDICINE

## 2024-02-08 PROCEDURE — 85025 COMPLETE CBC W/AUTO DIFF WBC: CPT

## 2024-02-08 PROCEDURE — 700105 HCHG RX REV CODE 258: Performed by: EMERGENCY MEDICINE

## 2024-02-08 PROCEDURE — 99214 OFFICE O/P EST MOD 30 MIN: CPT | Performed by: PHYSICIAN ASSISTANT

## 2024-02-08 PROCEDURE — 700111 HCHG RX REV CODE 636 W/ 250 OVERRIDE (IP): Mod: JZ | Performed by: EMERGENCY MEDICINE

## 2024-02-08 PROCEDURE — 80053 COMPREHEN METABOLIC PANEL: CPT

## 2024-02-08 PROCEDURE — 96365 THER/PROPH/DIAG IV INF INIT: CPT

## 2024-02-08 PROCEDURE — 42999 UNLISTED PX PHRNX ADND/TNSL: CPT

## 2024-02-08 RX ORDER — SODIUM CHLORIDE 9 MG/ML
1000 INJECTION, SOLUTION INTRAVENOUS ONCE
Status: DISCONTINUED | OUTPATIENT
Start: 2024-02-08 | End: 2024-02-08 | Stop reason: HOSPADM

## 2024-02-08 RX ORDER — DEXAMETHASONE SODIUM PHOSPHATE 4 MG/ML
10 INJECTION, SOLUTION INTRA-ARTICULAR; INTRALESIONAL; INTRAMUSCULAR; INTRAVENOUS; SOFT TISSUE ONCE
Status: COMPLETED | OUTPATIENT
Start: 2024-02-08 | End: 2024-02-08

## 2024-02-08 RX ORDER — AMOXICILLIN AND CLAVULANATE POTASSIUM 875; 125 MG/1; MG/1
1 TABLET, FILM COATED ORAL 2 TIMES DAILY
Qty: 20 TABLET | Refills: 0 | Status: ACTIVE | OUTPATIENT
Start: 2024-02-08 | End: 2024-02-18

## 2024-02-08 RX ADMIN — AMPICILLIN AND SULBACTAM 3 G: 1; 2 INJECTION, POWDER, FOR SOLUTION INTRAMUSCULAR; INTRAVENOUS at 15:28

## 2024-02-08 RX ADMIN — DEXAMETHASONE SODIUM PHOSPHATE 10 MG: 4 INJECTION INTRA-ARTICULAR; INTRALESIONAL; INTRAMUSCULAR; INTRAVENOUS; SOFT TISSUE at 14:29

## 2024-02-08 RX ADMIN — BENZOCAINE, BUTAMBEN, AND TETRACAINE HYDROCHLORIDE 1 SPRAY: .028; .004; .004 AEROSOL, SPRAY TOPICAL at 14:30

## 2024-02-08 ASSESSMENT — ENCOUNTER SYMPTOMS
COUGH: 0
SWOLLEN GLANDS: 1
DIARRHEA: 0
STRIDOR: 0
TROUBLE SWALLOWING: 1
SORE THROAT: 1
HEADACHES: 1
SHORTNESS OF BREATH: 0

## 2024-02-08 NOTE — ED PROVIDER NOTES
ED Provider Note    CHIEF COMPLAINT  Chief Complaint   Patient presents with    Oral Swelling     Patient reports right sided throat pain and swelling x 3 days. Patient sent by urgent care to rule out peritonsillar abscess. Patient managing oral secretions and able swallow soft foods and water.        EXTERNAL RECORDS REVIEWED  Urgent care note from prior to arrival, peritonsillar abscess, sent for intervention    SENAIT/JUDSON    Rangel Malik is a 53 y.o. male who presents for evaluation of peritonsillar abscess.  Sent by urgent care.  He has had pain for 4 days.  Pain with swallowing.  No difficulty swallowing or breathing otherwise.  No fever.  No neck pain or stiffness.  No other complaints.  Denies any medical problems but also admits that he has not been seen regularly by physicians.    PAST MEDICAL HISTORY   has a past medical history of Low back pain (9/26/2011), Rash and nonspecific skin eruption (10/31/2018), and Shortness of breath on exertion (10/31/2018).    SURGICAL HISTORY   has a past surgical history that includes vasectomy (2002) and circumcision adult (N/A, 2/15/2021).    FAMILY HISTORY  Family History   Problem Relation Age of Onset    No Known Problems Mother     No Known Problems Father     Cancer Neg Hx     Diabetes Neg Hx     Heart Disease Neg Hx     Hypertension Neg Hx     Hyperlipidemia Neg Hx     Stroke Neg Hx        SOCIAL HISTORY  Social History     Tobacco Use    Smoking status: Never    Smokeless tobacco: Never   Vaping Use    Vaping Use: Never used   Substance and Sexual Activity    Alcohol use: Yes     Comment: 4 beers per week    Drug use: No    Sexual activity: Yes       CURRENT MEDICATIONS  Home Medications    **Home medications have not yet been reviewed for this encounter**         ALLERGIES  No Known Allergies    PHYSICAL EXAM  VITAL SIGNS: /81   Pulse 90   Temp 36.8 °C (98.2 °F)   Resp 18   Wt 107 kg (235 lb 0.2 oz)   SpO2 94%   BMI 34.71 kg/m²    Constitutional:  Alert in no apparent distress.  HENT: Large right peritonsillar asymmetric edema with uvular deviation to the right side.  Eyes: Conjunctiva normal, non-icteric.   Chest: Normal nonlabored respirations  Skin: No appreciable rash on the exposed skin  Musculoskeletal: No obvious acute trauma appreciated  Neurologic: Alert, no obvious focal deficits noted.        DIAGNOSTIC STUDIES / PROCEDURES      Results for orders placed or performed during the hospital encounter of 02/08/24   CBC WITH DIFFERENTIAL   Result Value Ref Range    WBC 8.6 4.8 - 10.8 K/uL    RBC 6.17 (H) 4.70 - 6.10 M/uL    Hemoglobin 18.5 (H) 14.0 - 18.0 g/dL    Hematocrit 54.6 (H) 42.0 - 52.0 %    MCV 88.5 81.4 - 97.8 fL    MCH 30.0 27.0 - 33.0 pg    MCHC 33.9 32.3 - 36.5 g/dL    RDW 41.1 35.9 - 50.0 fL    Platelet Count 200 164 - 446 K/uL    MPV 11.2 9.0 - 12.9 fL    Neutrophils-Polys 75.00 (H) 44.00 - 72.00 %    Lymphocytes 16.40 (L) 22.00 - 41.00 %    Monocytes 6.60 0.00 - 13.40 %    Eosinophils 1.50 0.00 - 6.90 %    Basophils 0.20 0.00 - 1.80 %    Immature Granulocytes 0.30 0.00 - 0.90 %    Nucleated RBC 0.00 0.00 - 0.20 /100 WBC    Neutrophils (Absolute) 6.45 1.82 - 7.42 K/uL    Lymphs (Absolute) 1.41 1.00 - 4.80 K/uL    Monos (Absolute) 0.57 0.00 - 0.85 K/uL    Eos (Absolute) 0.13 0.00 - 0.51 K/uL    Baso (Absolute) 0.02 0.00 - 0.12 K/uL    Immature Granulocytes (abs) 0.03 0.00 - 0.11 K/uL    NRBC (Absolute) 0.00 K/uL   COMP METABOLIC PANEL   Result Value Ref Range    Sodium 140 135 - 145 mmol/L    Potassium 3.9 3.6 - 5.5 mmol/L    Chloride 100 96 - 112 mmol/L    Co2 19 (L) 20 - 33 mmol/L    Anion Gap 21.0 (H) 7.0 - 16.0    Glucose 206 (H) 65 - 99 mg/dL    Bun 5 (L) 8 - 22 mg/dL    Creatinine 0.75 0.50 - 1.40 mg/dL    Calcium 9.7 8.5 - 10.5 mg/dL    Correct Calcium 9.6 8.5 - 10.5 mg/dL    AST(SGOT) 10 (L) 12 - 45 U/L    ALT(SGPT) 8 2 - 50 U/L    Alkaline Phosphatase 82 30 - 99 U/L    Total Bilirubin 0.5 0.1 - 1.5 mg/dL    Albumin 4.1 3.2 - 4.9  g/dL    Total Protein 8.2 6.0 - 8.2 g/dL    Globulin 4.1 (H) 1.9 - 3.5 g/dL    A-G Ratio 1.0 g/dL   ESTIMATED GFR   Result Value Ref Range    GFR (CKD-EPI) 109 >60 mL/min/1.73 m 2        Peritonsillar Abscess Needle Aspiration Procedure Note    Indication: Peritonsillar abscess    Procedure: The patient was positioned appropriately.  Local anesthesia was obtained with a spray of topical Cetacaine.  An 18-gauge needle was inserted to a depth of 1 cm into the right peritonsillar space and I was able to aspirate 6 cc of purulent material.  No bleeding postprocedurally    The patient tolerated the procedure well.    Complications: None     COURSE & MEDICAL DECISION MAKING    ED Observation Status? No; Patient does not meet criteria for ED Observation.     INITIAL ASSESSMENT, COURSE AND PLAN  Care Narrative: 53-year-old male with a peritonsillar abscess.  Sent by urgent care.  The plan will be for needle aspiration performance here in the emergency department, I will then reach out to ENT to ensure timely follow-up.    Blood work reveals a normal WBC, significant hyperglycemia little bit greater than 200.  Will refer him to primary care for this.    Needle aspiration was successful here in the emergency department.  Received a dose of Unasyn and Decadron intravenously.  I discussed the case with Dr. Abrams, otolaryngologist on-call who will help facilitate follow-up.  I went over very strict return instructions at length with the patient and he expressed understanding.  Discharged home with a prescription for Augmentin  Prescription for Augmentin    DISPOSITION AND DISCUSSIONS  I have discussed management of the patient with the following physicians and LORRAINE's: Dr. Abrams, otolaryngology      FINAL DIAGNOSIS  1. Peritonsillar abscess           Electronically signed by: Crow Gregory M.D., 2/8/2024 1:56 PM

## 2024-02-08 NOTE — PROGRESS NOTES
"Subjective     Rangel Malik is a 53 y.o. male who presents with Pharyngitis (X 5 days)            Patient presents with:  Pharyngitis: X 5 days, right side swollen, soft palate and uvula swollen as well.  Pt has been able to eat and drink though it is very painful.  Patient is swallowing his own secretions.  Patient has been taking some over-the-counter ibuprofen with no change in his symptoms. No other complaints.         Pharyngitis   This is a new problem. The current episode started in the past 7 days. The problem has been unchanged. Neither side of throat is experiencing more pain than the other. There has been no fever. Associated symptoms include headaches, a plugged ear sensation, swollen glands and trouble swallowing. Pertinent negatives include no congestion, coughing, diarrhea, drooling, ear pain, shortness of breath or stridor. He has tried cool liquids and NSAIDs for the symptoms. The treatment provided no relief.       Review of Systems   HENT:  Positive for sore throat and trouble swallowing. Negative for congestion, drooling and ear pain.    Respiratory:  Negative for cough, shortness of breath and stridor.    Gastrointestinal:  Negative for diarrhea.   Neurological:  Positive for headaches.   All other systems reviewed and are negative.             Objective     /72 (BP Location: Left arm, Patient Position: Sitting, BP Cuff Size: Adult)   Pulse (!) 103   Temp 36.9 °C (98.4 °F) (Temporal)   Resp 18   Ht 1.753 m (5' 9\")   Wt 107 kg (235 lb)   SpO2 99%   BMI 34.70 kg/m²      Physical Exam  Vitals and nursing note reviewed.   Constitutional:       General: He is not in acute distress.     Appearance: Normal appearance. He is well-developed and normal weight. He is not ill-appearing or toxic-appearing.   HENT:      Head: Normocephalic.      Right Ear: Tympanic membrane normal.      Left Ear: Tympanic membrane normal.      Nose: Nose normal.      Mouth/Throat:      Lips: Pink.      " Mouth: Mucous membranes are moist.      Tongue: Tongue does not deviate from midline.      Pharynx: Pharyngeal swelling, posterior oropharyngeal erythema and uvula swelling present. No oropharyngeal exudate.      Tonsils: Tonsillar abscess present. No tonsillar exudate.        Comments: Oral exam reveals swelling to uvula, right side of the soft palate, right tonsil with a shift towards the left.  No swelling of the left tonsil.  Patient is swallowing his own secretions currently.  Slightly hot potato voice.  Exam concerning for peritonsillar abscess on the right.  Eyes:      Extraocular Movements: Extraocular movements intact.      Conjunctiva/sclera: Conjunctivae normal.      Pupils: Pupils are equal, round, and reactive to light.   Cardiovascular:      Rate and Rhythm: Normal rate and regular rhythm.      Heart sounds: Normal heart sounds.   Pulmonary:      Effort: Pulmonary effort is normal.      Breath sounds: Normal breath sounds.   Abdominal:      Palpations: Abdomen is soft.   Musculoskeletal:         General: Normal range of motion.      Cervical back: Normal range of motion and neck supple.   Lymphadenopathy:      Cervical: Cervical adenopathy present.   Skin:     General: Skin is warm and dry.      Capillary Refill: Capillary refill takes less than 2 seconds.   Neurological:      Mental Status: He is alert and oriented to person, place, and time.      Gait: Gait normal.   Psychiatric:         Mood and Affect: Mood normal.                             Assessment & Plan                 1. Tonsillar abscess        2. Sore throat          Patient HPI and physical exam is concerning for tonsillar/peritonsillar abscess as he has significantly swollen uvula, soft palate and tonsil on the right with midline shift.  No woody induration under his tongue, patient speaks with slightly muffled voice .  Cervical lymphadenopathy on the right as well.    I am sending patient to the emergency department, I feel he needs a  higher level of care that cannot be provided in this urgent care setting.  I do not have access to stat labs or CT/ultrasound here.    Patient has not had anything to eat or drink today, I have encouraged instructed patient to remain n.p.o. at this time.    [facility]    I have contacted [facility] , spoke with Gwen RN regarding pt dx and pending arrival.     Differential diagnosis, supportive care, and indications for immediate follow-up discussed with patient.  Instructed to return to clinic or nearest emergency department for any change in condition, further concerns, or worsening of symptoms.    I personally reviewed prior external notes and test results pertinent to today's visit.  I have independently reviewed and interpreted all diagnostics ordered during this urgent care visit.    PT should follow up with PCP in 1-2 days for re-evaluation if symptoms have not improved.      Discussed red flags and reasons to return to UC or ED.      Pt and/or family verbalized understanding of diagnosis and follow up instructions and was offered informational handout on diagnosis.  PT discharged.     Please note that this dictation was created using voice recognition software. I have made every reasonable attempt to correct obvious errors, but I expect that there may be errors of grammar and possibly content that I did not discover before finalizing the note.

## 2024-02-08 NOTE — ED TRIAGE NOTES
Rangel Malik  53 y.o. male  Chief Complaint   Patient presents with    Oral Swelling     Patient reports right sided throat pain and swelling x 3 days. Patient sent by urgent care to rule out peritonsillar abscess. Patient managing oral secretions and able swallow soft foods and water.        Vitals:    02/08/24 1308   BP: (!) 132/93   Pulse: 95   Resp: 18   Temp: (!) 35.7 °C (96.3 °F)   SpO2: 98%       Triage process explained to patient, apologized for wait time, and returned to lobby.  Pt informed to notify staff of any change in condition.

## 2024-02-09 NOTE — ED NOTES
1558 late entry..Pt verbalizes understanding of dc instructions. Rx given.Pt states all questions have been answered and they feel comfortable with dc instructions provided. Pt states has all personal belonging. Pt to lobby w/o incident    No

## 2024-04-01 ENCOUNTER — TELEPHONE (OUTPATIENT)
Dept: HEALTH INFORMATION MANAGEMENT | Facility: OTHER | Age: 54
End: 2024-04-01
Payer: COMMERCIAL

## 2024-11-01 ENCOUNTER — OFFICE VISIT (OUTPATIENT)
Dept: URGENT CARE | Facility: PHYSICIAN GROUP | Age: 54
End: 2024-11-01
Payer: COMMERCIAL

## 2024-11-01 ENCOUNTER — APPOINTMENT (OUTPATIENT)
Dept: RADIOLOGY | Facility: MEDICAL CENTER | Age: 54
DRG: 152 | End: 2024-11-01
Attending: EMERGENCY MEDICINE
Payer: COMMERCIAL

## 2024-11-01 ENCOUNTER — HOSPITAL ENCOUNTER (INPATIENT)
Facility: MEDICAL CENTER | Age: 54
LOS: 3 days | DRG: 152 | End: 2024-11-04
Attending: EMERGENCY MEDICINE | Admitting: STUDENT IN AN ORGANIZED HEALTH CARE EDUCATION/TRAINING PROGRAM
Payer: COMMERCIAL

## 2024-11-01 VITALS
BODY MASS INDEX: 33.47 KG/M2 | DIASTOLIC BLOOD PRESSURE: 80 MMHG | HEART RATE: 109 BPM | SYSTOLIC BLOOD PRESSURE: 112 MMHG | HEIGHT: 69 IN | OXYGEN SATURATION: 97 % | TEMPERATURE: 98.7 F | WEIGHT: 226 LBS | RESPIRATION RATE: 16 BRPM

## 2024-11-01 DIAGNOSIS — J36 PERITONSILLAR ABSCESS: ICD-10-CM

## 2024-11-01 DIAGNOSIS — B37.0 THRUSH: ICD-10-CM

## 2024-11-01 DIAGNOSIS — E78.5 HYPERLIPIDEMIA, UNSPECIFIED HYPERLIPIDEMIA TYPE: ICD-10-CM

## 2024-11-01 DIAGNOSIS — E11.65 TYPE 2 DIABETES MELLITUS WITH HYPERGLYCEMIA, UNSPECIFIED WHETHER LONG TERM INSULIN USE (HCC): ICD-10-CM

## 2024-11-01 DIAGNOSIS — J02.9 PHARYNGITIS, UNSPECIFIED ETIOLOGY: ICD-10-CM

## 2024-11-01 PROBLEM — R65.10 SIRS (SYSTEMIC INFLAMMATORY RESPONSE SYNDROME) (HCC): Status: ACTIVE | Noted: 2024-11-01

## 2024-11-01 PROBLEM — R73.9 HYPERGLYCEMIA: Status: ACTIVE | Noted: 2024-11-01

## 2024-11-01 LAB
ALBUMIN SERPL BCP-MCNC: 4.6 G/DL (ref 3.2–4.9)
ALBUMIN/GLOB SERPL: 1.1 G/DL
ALP SERPL-CCNC: 92 U/L (ref 30–99)
ALT SERPL-CCNC: 7 U/L (ref 2–50)
ANION GAP SERPL CALC-SCNC: 22 MMOL/L (ref 7–16)
AST SERPL-CCNC: 11 U/L (ref 12–45)
BASOPHILS # BLD AUTO: 0.3 % (ref 0–1.8)
BASOPHILS # BLD: 0.04 K/UL (ref 0–0.12)
BILIRUB SERPL-MCNC: 0.7 MG/DL (ref 0.1–1.5)
BUN SERPL-MCNC: 9 MG/DL (ref 8–22)
CALCIUM ALBUM COR SERPL-MCNC: 9.5 MG/DL (ref 8.5–10.5)
CALCIUM SERPL-MCNC: 10 MG/DL (ref 8.5–10.5)
CHLORIDE SERPL-SCNC: 97 MMOL/L (ref 96–112)
CO2 SERPL-SCNC: 15 MMOL/L (ref 20–33)
CREAT SERPL-MCNC: 0.97 MG/DL (ref 0.5–1.4)
EOSINOPHIL # BLD AUTO: 0.01 K/UL (ref 0–0.51)
EOSINOPHIL NFR BLD: 0.1 % (ref 0–6.9)
ERYTHROCYTE [DISTWIDTH] IN BLOOD BY AUTOMATED COUNT: 40.4 FL (ref 35.9–50)
EST. AVERAGE GLUCOSE BLD GHB EST-MCNC: 341 MG/DL
GFR SERPLBLD CREATININE-BSD FMLA CKD-EPI: 94 ML/MIN/1.73 M 2
GLOBULIN SER CALC-MCNC: 4.2 G/DL (ref 1.9–3.5)
GLUCOSE SERPL-MCNC: 243 MG/DL (ref 65–99)
HBA1C MFR BLD: 13.5 % (ref 4–5.6)
HCT VFR BLD AUTO: 56.1 % (ref 42–52)
HGB BLD-MCNC: 18.5 G/DL (ref 14–18)
IMM GRANULOCYTES # BLD AUTO: 0.07 K/UL (ref 0–0.11)
IMM GRANULOCYTES NFR BLD AUTO: 0.4 % (ref 0–0.9)
LYMPHOCYTES # BLD AUTO: 1.12 K/UL (ref 1–4.8)
LYMPHOCYTES NFR BLD: 7.1 % (ref 22–41)
MCH RBC QN AUTO: 29.4 PG (ref 27–33)
MCHC RBC AUTO-ENTMCNC: 33 G/DL (ref 32.3–36.5)
MCV RBC AUTO: 89.2 FL (ref 81.4–97.8)
MONOCYTES # BLD AUTO: 0.44 K/UL (ref 0–0.85)
MONOCYTES NFR BLD AUTO: 2.8 % (ref 0–13.4)
NEUTROPHILS # BLD AUTO: 14.04 K/UL (ref 1.82–7.42)
NEUTROPHILS NFR BLD: 89.3 % (ref 44–72)
NRBC # BLD AUTO: 0 K/UL
NRBC BLD-RTO: 0 /100 WBC (ref 0–0.2)
PLATELET # BLD AUTO: 210 K/UL (ref 164–446)
PMV BLD AUTO: 10.9 FL (ref 9–12.9)
POTASSIUM SERPL-SCNC: 4.7 MMOL/L (ref 3.6–5.5)
PROT SERPL-MCNC: 8.8 G/DL (ref 6–8.2)
RBC # BLD AUTO: 6.29 M/UL (ref 4.7–6.1)
S PYO DNA SPEC NAA+PROBE: NOT DETECTED
SODIUM SERPL-SCNC: 134 MMOL/L (ref 135–145)
WBC # BLD AUTO: 15.7 K/UL (ref 4.8–10.8)

## 2024-11-01 PROCEDURE — 87147 CULTURE TYPE IMMUNOLOGIC: CPT

## 2024-11-01 PROCEDURE — 700101 HCHG RX REV CODE 250: Performed by: EMERGENCY MEDICINE

## 2024-11-01 PROCEDURE — 700105 HCHG RX REV CODE 258: Performed by: EMERGENCY MEDICINE

## 2024-11-01 PROCEDURE — 87077 CULTURE AEROBIC IDENTIFY: CPT

## 2024-11-01 PROCEDURE — 99285 EMERGENCY DEPT VISIT HI MDM: CPT

## 2024-11-01 PROCEDURE — 82962 GLUCOSE BLOOD TEST: CPT

## 2024-11-01 PROCEDURE — 42999 UNLISTED PX PHRNX ADND/TNSL: CPT

## 2024-11-01 PROCEDURE — 700102 HCHG RX REV CODE 250 W/ 637 OVERRIDE(OP): Performed by: EMERGENCY MEDICINE

## 2024-11-01 PROCEDURE — 83036 HEMOGLOBIN GLYCOSYLATED A1C: CPT

## 2024-11-01 PROCEDURE — 0C9P3ZZ DRAINAGE OF TONSILS, PERCUTANEOUS APPROACH: ICD-10-PCS | Performed by: EMERGENCY MEDICINE

## 2024-11-01 PROCEDURE — A9270 NON-COVERED ITEM OR SERVICE: HCPCS | Performed by: EMERGENCY MEDICINE

## 2024-11-01 PROCEDURE — 99213 OFFICE O/P EST LOW 20 MIN: CPT | Performed by: STUDENT IN AN ORGANIZED HEALTH CARE EDUCATION/TRAINING PROGRAM

## 2024-11-01 PROCEDURE — 96365 THER/PROPH/DIAG IV INF INIT: CPT

## 2024-11-01 PROCEDURE — 87186 SC STD MICRODIL/AGAR DIL: CPT

## 2024-11-01 PROCEDURE — 700117 HCHG RX CONTRAST REV CODE 255: Performed by: EMERGENCY MEDICINE

## 2024-11-01 PROCEDURE — 700111 HCHG RX REV CODE 636 W/ 250 OVERRIDE (IP): Mod: JZ | Performed by: EMERGENCY MEDICINE

## 2024-11-01 PROCEDURE — 96375 TX/PRO/DX INJ NEW DRUG ADDON: CPT

## 2024-11-01 PROCEDURE — 770006 HCHG ROOM/CARE - MED/SURG/GYN SEMI*

## 2024-11-01 PROCEDURE — 87070 CULTURE OTHR SPECIMN AEROBIC: CPT

## 2024-11-01 PROCEDURE — 87205 SMEAR GRAM STAIN: CPT

## 2024-11-01 PROCEDURE — 85025 COMPLETE CBC W/AUTO DIFF WBC: CPT

## 2024-11-01 PROCEDURE — 36415 COLL VENOUS BLD VENIPUNCTURE: CPT

## 2024-11-01 PROCEDURE — 700111 HCHG RX REV CODE 636 W/ 250 OVERRIDE (IP): Performed by: STUDENT IN AN ORGANIZED HEALTH CARE EDUCATION/TRAINING PROGRAM

## 2024-11-01 PROCEDURE — 70491 CT SOFT TISSUE NECK W/DYE: CPT

## 2024-11-01 PROCEDURE — 99291 CRITICAL CARE FIRST HOUR: CPT | Performed by: STUDENT IN AN ORGANIZED HEALTH CARE EDUCATION/TRAINING PROGRAM

## 2024-11-01 PROCEDURE — 80053 COMPREHEN METABOLIC PANEL: CPT

## 2024-11-01 PROCEDURE — 87651 STREP A DNA AMP PROBE: CPT

## 2024-11-01 RX ORDER — ONDANSETRON 4 MG/1
4 TABLET, ORALLY DISINTEGRATING ORAL EVERY 4 HOURS PRN
Status: DISCONTINUED | OUTPATIENT
Start: 2024-11-01 | End: 2024-11-04 | Stop reason: HOSPADM

## 2024-11-01 RX ORDER — PROMETHAZINE HYDROCHLORIDE 25 MG/1
12.5-25 SUPPOSITORY RECTAL EVERY 4 HOURS PRN
Status: DISCONTINUED | OUTPATIENT
Start: 2024-11-01 | End: 2024-11-04 | Stop reason: HOSPADM

## 2024-11-01 RX ORDER — ACETAMINOPHEN 325 MG/1
650 TABLET ORAL EVERY 6 HOURS PRN
Status: DISCONTINUED | OUTPATIENT
Start: 2024-11-01 | End: 2024-11-04 | Stop reason: HOSPADM

## 2024-11-01 RX ORDER — KETOROLAC TROMETHAMINE 15 MG/ML
15 INJECTION, SOLUTION INTRAMUSCULAR; INTRAVENOUS EVERY 6 HOURS PRN
Status: DISCONTINUED | OUTPATIENT
Start: 2024-11-01 | End: 2024-11-04 | Stop reason: HOSPADM

## 2024-11-01 RX ORDER — KETOROLAC TROMETHAMINE 15 MG/ML
15 INJECTION, SOLUTION INTRAMUSCULAR; INTRAVENOUS ONCE
Status: COMPLETED | OUTPATIENT
Start: 2024-11-01 | End: 2024-11-01

## 2024-11-01 RX ORDER — ONDANSETRON 2 MG/ML
4 INJECTION INTRAMUSCULAR; INTRAVENOUS EVERY 4 HOURS PRN
Status: DISCONTINUED | OUTPATIENT
Start: 2024-11-01 | End: 2024-11-04 | Stop reason: HOSPADM

## 2024-11-01 RX ORDER — LABETALOL HYDROCHLORIDE 5 MG/ML
10 INJECTION, SOLUTION INTRAVENOUS EVERY 4 HOURS PRN
Status: DISCONTINUED | OUTPATIENT
Start: 2024-11-01 | End: 2024-11-04 | Stop reason: HOSPADM

## 2024-11-01 RX ORDER — ACETAMINOPHEN 500 MG
2000 TABLET ORAL
Status: ON HOLD | COMMUNITY
End: 2024-11-04

## 2024-11-01 RX ORDER — PROMETHAZINE HYDROCHLORIDE 25 MG/1
12.5-25 TABLET ORAL EVERY 4 HOURS PRN
Status: DISCONTINUED | OUTPATIENT
Start: 2024-11-01 | End: 2024-11-04 | Stop reason: HOSPADM

## 2024-11-01 RX ORDER — LIDOCAINE HYDROCHLORIDE AND EPINEPHRINE 10; 10 MG/ML; UG/ML
10 INJECTION, SOLUTION INFILTRATION; PERINEURAL ONCE
Status: COMPLETED | OUTPATIENT
Start: 2024-11-01 | End: 2024-11-01

## 2024-11-01 RX ORDER — SODIUM CHLORIDE 9 MG/ML
INJECTION, SOLUTION INTRAVENOUS CONTINUOUS
Status: DISCONTINUED | OUTPATIENT
Start: 2024-11-01 | End: 2024-11-04 | Stop reason: HOSPADM

## 2024-11-01 RX ORDER — SODIUM CHLORIDE 9 MG/ML
1000 INJECTION, SOLUTION INTRAVENOUS ONCE
Status: COMPLETED | OUTPATIENT
Start: 2024-11-01 | End: 2024-11-01

## 2024-11-01 RX ORDER — IBUPROFEN 200 MG
400 TABLET ORAL EVERY 8 HOURS PRN
Status: ON HOLD | COMMUNITY
End: 2024-11-04

## 2024-11-01 RX ORDER — DEXAMETHASONE SODIUM PHOSPHATE 10 MG/ML
10 INJECTION INTRAMUSCULAR; INTRAVENOUS ONCE
Status: COMPLETED | OUTPATIENT
Start: 2024-11-01 | End: 2024-11-01

## 2024-11-01 RX ORDER — PROCHLORPERAZINE EDISYLATE 5 MG/ML
5-10 INJECTION INTRAMUSCULAR; INTRAVENOUS EVERY 4 HOURS PRN
Status: DISCONTINUED | OUTPATIENT
Start: 2024-11-01 | End: 2024-11-04 | Stop reason: HOSPADM

## 2024-11-01 RX ADMIN — KETOROLAC TROMETHAMINE 15 MG: 15 INJECTION, SOLUTION INTRAMUSCULAR; INTRAVENOUS at 21:02

## 2024-11-01 RX ADMIN — AMPICILLIN AND SULBACTAM 3 G: 1; 2 INJECTION, POWDER, FOR SOLUTION INTRAMUSCULAR; INTRAVENOUS at 21:04

## 2024-11-01 RX ADMIN — INSULIN HUMAN 5 UNITS: 1 INJECTION, SOLUTION INTRAVENOUS at 23:54

## 2024-11-01 RX ADMIN — BENZOCAINE, BUTAMBEN, AND TETRACAINE HYDROCHLORIDE 1 SPRAY: .028; .004; .004 AEROSOL, SPRAY TOPICAL at 23:57

## 2024-11-01 RX ADMIN — IOHEXOL 80 ML: 350 INJECTION, SOLUTION INTRAVENOUS at 22:00

## 2024-11-01 RX ADMIN — SODIUM CHLORIDE 1000 ML: 9 INJECTION, SOLUTION INTRAVENOUS at 23:57

## 2024-11-01 RX ADMIN — LIDOCAINE HYDROCHLORIDE AND EPINEPHRINE 10 ML: 10; 10 INJECTION, SOLUTION INFILTRATION; PERINEURAL at 23:00

## 2024-11-01 RX ADMIN — DEXAMETHASONE SODIUM PHOSPHATE 10 MG: 10 INJECTION INTRAMUSCULAR; INTRAVENOUS at 18:31

## 2024-11-01 ASSESSMENT — FIBROSIS 4 INDEX
FIB4 SCORE: 0.9
FIB4 SCORE: 0.9

## 2024-11-01 ASSESSMENT — PAIN DESCRIPTION - PAIN TYPE: TYPE: ACUTE PAIN

## 2024-11-02 PROBLEM — E11.65 UNCONTROLLED DIABETES MELLITUS WITH HYPERGLYCEMIA (HCC): Status: ACTIVE | Noted: 2024-11-01

## 2024-11-02 PROBLEM — E11.10 DKA (DIABETIC KETOACIDOSIS) (HCC): Status: ACTIVE | Noted: 2024-11-02

## 2024-11-02 PROBLEM — E66.811 CLASS 1 OBESITY IN ADULT: Status: ACTIVE | Noted: 2024-11-02

## 2024-11-02 LAB
ALBUMIN SERPL BCP-MCNC: 4 G/DL (ref 3.2–4.9)
ALBUMIN/GLOB SERPL: 1.1 G/DL
ALP SERPL-CCNC: 82 U/L (ref 30–99)
ALT SERPL-CCNC: 6 U/L (ref 2–50)
ANION GAP SERPL CALC-SCNC: 14 MMOL/L (ref 7–16)
ANION GAP SERPL CALC-SCNC: 19 MMOL/L (ref 7–16)
ANION GAP SERPL CALC-SCNC: 23 MMOL/L (ref 7–16)
AST SERPL-CCNC: 12 U/L (ref 12–45)
B-OH-BUTYR SERPL-MCNC: 5.21 MMOL/L (ref 0.02–0.27)
BASE EXCESS BLDV CALC-SCNC: -11 MMOL/L
BASOPHILS # BLD AUTO: 0.1 % (ref 0–1.8)
BASOPHILS # BLD: 0.01 K/UL (ref 0–0.12)
BILIRUB SERPL-MCNC: 0.5 MG/DL (ref 0.1–1.5)
BODY TEMPERATURE: 36.1 CENTIGRADE
BUN SERPL-MCNC: 13 MG/DL (ref 8–22)
BUN SERPL-MCNC: 14 MG/DL (ref 8–22)
BUN SERPL-MCNC: 16 MG/DL (ref 8–22)
CALCIUM ALBUM COR SERPL-MCNC: 9.5 MG/DL (ref 8.5–10.5)
CALCIUM SERPL-MCNC: 9.2 MG/DL (ref 8.5–10.5)
CALCIUM SERPL-MCNC: 9.2 MG/DL (ref 8.5–10.5)
CALCIUM SERPL-MCNC: 9.5 MG/DL (ref 8.5–10.5)
CHLORIDE SERPL-SCNC: 100 MMOL/L (ref 96–112)
CHLORIDE SERPL-SCNC: 101 MMOL/L (ref 96–112)
CHLORIDE SERPL-SCNC: 98 MMOL/L (ref 96–112)
CO2 SERPL-SCNC: 13 MMOL/L (ref 20–33)
CO2 SERPL-SCNC: 14 MMOL/L (ref 20–33)
CO2 SERPL-SCNC: 17 MMOL/L (ref 20–33)
CREAT SERPL-MCNC: 0.95 MG/DL (ref 0.5–1.4)
CREAT SERPL-MCNC: 1.03 MG/DL (ref 0.5–1.4)
CREAT SERPL-MCNC: 1.06 MG/DL (ref 0.5–1.4)
EOSINOPHIL # BLD AUTO: 0 K/UL (ref 0–0.51)
EOSINOPHIL NFR BLD: 0 % (ref 0–6.9)
ERYTHROCYTE [DISTWIDTH] IN BLOOD BY AUTOMATED COUNT: 40.1 FL (ref 35.9–50)
GFR SERPLBLD CREATININE-BSD FMLA CKD-EPI: 84 ML/MIN/1.73 M 2
GFR SERPLBLD CREATININE-BSD FMLA CKD-EPI: 87 ML/MIN/1.73 M 2
GFR SERPLBLD CREATININE-BSD FMLA CKD-EPI: 96 ML/MIN/1.73 M 2
GLOBULIN SER CALC-MCNC: 3.7 G/DL (ref 1.9–3.5)
GLUCOSE BLD STRIP.AUTO-MCNC: 220 MG/DL (ref 65–99)
GLUCOSE BLD STRIP.AUTO-MCNC: 237 MG/DL (ref 65–99)
GLUCOSE BLD STRIP.AUTO-MCNC: 245 MG/DL (ref 65–99)
GLUCOSE BLD STRIP.AUTO-MCNC: 263 MG/DL (ref 65–99)
GLUCOSE BLD STRIP.AUTO-MCNC: 309 MG/DL (ref 65–99)
GLUCOSE SERPL-MCNC: 259 MG/DL (ref 65–99)
GLUCOSE SERPL-MCNC: 297 MG/DL (ref 65–99)
GLUCOSE SERPL-MCNC: 298 MG/DL (ref 65–99)
GRAM STN SPEC: NORMAL
HCO3 BLDV-SCNC: 14 MMOL/L (ref 24–28)
HCT VFR BLD AUTO: 51.1 % (ref 42–52)
HGB BLD-MCNC: 16.9 G/DL (ref 14–18)
IMM GRANULOCYTES # BLD AUTO: 0.05 K/UL (ref 0–0.11)
IMM GRANULOCYTES NFR BLD AUTO: 0.5 % (ref 0–0.9)
INHALED O2 FLOW RATE: ABNORMAL L/MIN
LACTATE SERPL-SCNC: 1 MMOL/L (ref 0.5–2)
LYMPHOCYTES # BLD AUTO: 0.7 K/UL (ref 1–4.8)
LYMPHOCYTES NFR BLD: 6.6 % (ref 22–41)
MCH RBC QN AUTO: 29.6 PG (ref 27–33)
MCHC RBC AUTO-ENTMCNC: 33.1 G/DL (ref 32.3–36.5)
MCV RBC AUTO: 89.5 FL (ref 81.4–97.8)
MONOCYTES # BLD AUTO: 0.14 K/UL (ref 0–0.85)
MONOCYTES NFR BLD AUTO: 1.3 % (ref 0–13.4)
NEUTROPHILS # BLD AUTO: 9.71 K/UL (ref 1.82–7.42)
NEUTROPHILS NFR BLD: 91.5 % (ref 44–72)
NRBC # BLD AUTO: 0 K/UL
NRBC BLD-RTO: 0 /100 WBC (ref 0–0.2)
PCO2 BLDV: 30 MMHG (ref 41–51)
PCO2 TEMP ADJ BLDV: 28.8 MMHG (ref 41–51)
PH BLDV: 7.3 [PH] (ref 7.31–7.45)
PH TEMP ADJ BLDV: 7.31 [PH] (ref 7.31–7.45)
PLATELET # BLD AUTO: 183 K/UL (ref 164–446)
PMV BLD AUTO: 10.7 FL (ref 9–12.9)
PO2 BLDV: 49.6 MMHG (ref 25–40)
PO2 TEMP ADJ BLDV: 46.6 MMHG (ref 25–40)
POTASSIUM SERPL-SCNC: 4.2 MMOL/L (ref 3.6–5.5)
POTASSIUM SERPL-SCNC: 4.3 MMOL/L (ref 3.6–5.5)
POTASSIUM SERPL-SCNC: 4.5 MMOL/L (ref 3.6–5.5)
PROT SERPL-MCNC: 7.7 G/DL (ref 6–8.2)
RBC # BLD AUTO: 5.71 M/UL (ref 4.7–6.1)
SAO2 % BLDV: 84.5 %
SIGNIFICANT IND 70042: NORMAL
SITE SITE: NORMAL
SODIUM SERPL-SCNC: 132 MMOL/L (ref 135–145)
SODIUM SERPL-SCNC: 133 MMOL/L (ref 135–145)
SODIUM SERPL-SCNC: 134 MMOL/L (ref 135–145)
SOURCE SOURCE: NORMAL
WBC # BLD AUTO: 10.6 K/UL (ref 4.8–10.8)

## 2024-11-02 PROCEDURE — 770006 HCHG ROOM/CARE - MED/SURG/GYN SEMI*

## 2024-11-02 PROCEDURE — 700105 HCHG RX REV CODE 258: Performed by: STUDENT IN AN ORGANIZED HEALTH CARE EDUCATION/TRAINING PROGRAM

## 2024-11-02 PROCEDURE — 82010 KETONE BODYS QUAN: CPT

## 2024-11-02 PROCEDURE — 80053 COMPREHEN METABOLIC PANEL: CPT

## 2024-11-02 PROCEDURE — 700102 HCHG RX REV CODE 250 W/ 637 OVERRIDE(OP): Performed by: STUDENT IN AN ORGANIZED HEALTH CARE EDUCATION/TRAINING PROGRAM

## 2024-11-02 PROCEDURE — 700111 HCHG RX REV CODE 636 W/ 250 OVERRIDE (IP): Mod: JZ | Performed by: STUDENT IN AN ORGANIZED HEALTH CARE EDUCATION/TRAINING PROGRAM

## 2024-11-02 PROCEDURE — 82962 GLUCOSE BLOOD TEST: CPT

## 2024-11-02 PROCEDURE — 36415 COLL VENOUS BLD VENIPUNCTURE: CPT

## 2024-11-02 PROCEDURE — A9270 NON-COVERED ITEM OR SERVICE: HCPCS | Performed by: STUDENT IN AN ORGANIZED HEALTH CARE EDUCATION/TRAINING PROGRAM

## 2024-11-02 PROCEDURE — 80048 BASIC METABOLIC PNL TOTAL CA: CPT | Mod: 91

## 2024-11-02 PROCEDURE — 83605 ASSAY OF LACTIC ACID: CPT

## 2024-11-02 PROCEDURE — 82803 BLOOD GASES ANY COMBINATION: CPT

## 2024-11-02 PROCEDURE — 99233 SBSQ HOSP IP/OBS HIGH 50: CPT | Performed by: STUDENT IN AN ORGANIZED HEALTH CARE EDUCATION/TRAINING PROGRAM

## 2024-11-02 PROCEDURE — 85025 COMPLETE CBC W/AUTO DIFF WBC: CPT

## 2024-11-02 RX ORDER — INSULIN LISPRO 100 [IU]/ML
2-9 INJECTION, SOLUTION INTRAVENOUS; SUBCUTANEOUS
Status: DISCONTINUED | OUTPATIENT
Start: 2024-11-02 | End: 2024-11-02

## 2024-11-02 RX ORDER — INSULIN LISPRO 100 [IU]/ML
2-9 INJECTION, SOLUTION INTRAVENOUS; SUBCUTANEOUS EVERY 6 HOURS
Status: DISCONTINUED | OUTPATIENT
Start: 2024-11-02 | End: 2024-11-02

## 2024-11-02 RX ORDER — DEXTROSE MONOHYDRATE 25 G/50ML
25 INJECTION, SOLUTION INTRAVENOUS
Status: DISCONTINUED | OUTPATIENT
Start: 2024-11-02 | End: 2024-11-02

## 2024-11-02 RX ORDER — DEXTROSE MONOHYDRATE 25 G/50ML
25 INJECTION, SOLUTION INTRAVENOUS
Status: DISCONTINUED | OUTPATIENT
Start: 2024-11-02 | End: 2024-11-03

## 2024-11-02 RX ORDER — INSULIN LISPRO 100 [IU]/ML
2-9 INJECTION, SOLUTION INTRAVENOUS; SUBCUTANEOUS EVERY 6 HOURS
Status: DISCONTINUED | OUTPATIENT
Start: 2024-11-02 | End: 2024-11-03

## 2024-11-02 RX ADMIN — INSULIN GLARGINE-YFGN 15 UNITS: 100 INJECTION, SOLUTION SUBCUTANEOUS at 12:04

## 2024-11-02 RX ADMIN — INSULIN LISPRO 3 UNITS: 100 INJECTION, SOLUTION INTRAVENOUS; SUBCUTANEOUS at 23:37

## 2024-11-02 RX ADMIN — INSULIN LISPRO 5 UNITS: 100 INJECTION, SOLUTION INTRAVENOUS; SUBCUTANEOUS at 12:05

## 2024-11-02 RX ADMIN — SODIUM CHLORIDE: 9 INJECTION, SOLUTION INTRAVENOUS at 00:22

## 2024-11-02 RX ADMIN — KETOROLAC TROMETHAMINE 15 MG: 15 INJECTION, SOLUTION INTRAMUSCULAR; INTRAVENOUS at 15:19

## 2024-11-02 RX ADMIN — SODIUM CHLORIDE: 9 INJECTION, SOLUTION INTRAVENOUS at 20:02

## 2024-11-02 RX ADMIN — KETOROLAC TROMETHAMINE 15 MG: 15 INJECTION, SOLUTION INTRAMUSCULAR; INTRAVENOUS at 22:34

## 2024-11-02 RX ADMIN — AMPICILLIN AND SULBACTAM 3 G: 1; 2 INJECTION, POWDER, FOR SOLUTION INTRAMUSCULAR; INTRAVENOUS at 17:25

## 2024-11-02 RX ADMIN — INSULIN LISPRO 3 UNITS: 100 INJECTION, SOLUTION INTRAVENOUS; SUBCUTANEOUS at 18:27

## 2024-11-02 RX ADMIN — INSULIN LISPRO 3 UNITS: 100 INJECTION, SOLUTION INTRAVENOUS; SUBCUTANEOUS at 08:38

## 2024-11-02 RX ADMIN — SODIUM BICARBONATE 50 MEQ: 84 INJECTION, SOLUTION INTRAVENOUS at 01:08

## 2024-11-02 RX ADMIN — ACETAMINOPHEN 650 MG: 325 TABLET ORAL at 21:59

## 2024-11-02 RX ADMIN — AMPICILLIN AND SULBACTAM 3 G: 1; 2 INJECTION, POWDER, FOR SOLUTION INTRAMUSCULAR; INTRAVENOUS at 07:38

## 2024-11-02 RX ADMIN — AMPICILLIN AND SULBACTAM 3 G: 1; 2 INJECTION, POWDER, FOR SOLUTION INTRAMUSCULAR; INTRAVENOUS at 02:28

## 2024-11-02 RX ADMIN — AMPICILLIN AND SULBACTAM 3 G: 1; 2 INJECTION, POWDER, FOR SOLUTION INTRAMUSCULAR; INTRAVENOUS at 12:57

## 2024-11-02 RX ADMIN — AMPICILLIN AND SULBACTAM 3 G: 1; 2 INJECTION, POWDER, FOR SOLUTION INTRAMUSCULAR; INTRAVENOUS at 23:41

## 2024-11-02 SDOH — ECONOMIC STABILITY: TRANSPORTATION INSECURITY
IN THE PAST 12 MONTHS, HAS THE LACK OF TRANSPORTATION KEPT YOU FROM MEDICAL APPOINTMENTS OR FROM GETTING MEDICATIONS?: NO

## 2024-11-02 SDOH — ECONOMIC STABILITY: TRANSPORTATION INSECURITY
IN THE PAST 12 MONTHS, HAS LACK OF RELIABLE TRANSPORTATION KEPT YOU FROM MEDICAL APPOINTMENTS, MEETINGS, WORK OR FROM GETTING THINGS NEEDED FOR DAILY LIVING?: NO

## 2024-11-02 ASSESSMENT — SOCIAL DETERMINANTS OF HEALTH (SDOH)

## 2024-11-02 ASSESSMENT — COGNITIVE AND FUNCTIONAL STATUS - GENERAL
SUGGESTED CMS G CODE MODIFIER DAILY ACTIVITY: CH
MOBILITY SCORE: 24
SUGGESTED CMS G CODE MODIFIER MOBILITY: CH
DAILY ACTIVITIY SCORE: 24

## 2024-11-02 ASSESSMENT — LIFESTYLE VARIABLES
EVER HAD A DRINK FIRST THING IN THE MORNING TO STEADY YOUR NERVES TO GET RID OF A HANGOVER: NO
EVER FELT BAD OR GUILTY ABOUT YOUR DRINKING: NO
DOES PATIENT WANT TO STOP DRINKING: NO
HAVE PEOPLE ANNOYED YOU BY CRITICIZING YOUR DRINKING: NO
ON A TYPICAL DAY WHEN YOU DRINK ALCOHOL HOW MANY DRINKS DO YOU HAVE: 3
TOTAL SCORE: 0
ALCOHOL_USE: YES
CONSUMPTION TOTAL: POSITIVE
TOTAL SCORE: 0
TOTAL SCORE: 0
HAVE YOU EVER FELT YOU SHOULD CUT DOWN ON YOUR DRINKING: NO
HOW MANY TIMES IN THE PAST YEAR HAVE YOU HAD 5 OR MORE DRINKS IN A DAY: 6
AVERAGE NUMBER OF DAYS PER WEEK YOU HAVE A DRINK CONTAINING ALCOHOL: 3

## 2024-11-02 ASSESSMENT — ENCOUNTER SYMPTOMS
EYES NEGATIVE: 1
NEUROLOGICAL NEGATIVE: 1
MUSCULOSKELETAL NEGATIVE: 1
FEVER: 0
CHILLS: 1
GASTROINTESTINAL NEGATIVE: 1
SORE THROAT: 1
PSYCHIATRIC NEGATIVE: 1
RESPIRATORY NEGATIVE: 1
CARDIOVASCULAR NEGATIVE: 1

## 2024-11-02 ASSESSMENT — PATIENT HEALTH QUESTIONNAIRE - PHQ9
8. MOVING OR SPEAKING SO SLOWLY THAT OTHER PEOPLE COULD HAVE NOTICED. OR THE OPPOSITE, BEING SO FIGETY OR RESTLESS THAT YOU HAVE BEEN MOVING AROUND A LOT MORE THAN USUAL: NOT AT ALL
6. FEELING BAD ABOUT YOURSELF - OR THAT YOU ARE A FAILURE OR HAVE LET YOURSELF OR YOUR FAMILY DOWN: NOT AL ALL
2. FEELING DOWN, DEPRESSED, IRRITABLE, OR HOPELESS: NOT AT ALL
SUM OF ALL RESPONSES TO PHQ9 QUESTIONS 1 AND 2: 1
5. POOR APPETITE OR OVEREATING: SEVERAL DAYS
3. TROUBLE FALLING OR STAYING ASLEEP OR SLEEPING TOO MUCH: NOT AT ALL
7. TROUBLE CONCENTRATING ON THINGS, SUCH AS READING THE NEWSPAPER OR WATCHING TELEVISION: NOT AT ALL
SUM OF ALL RESPONSES TO PHQ QUESTIONS 1-9: 3
4. FEELING TIRED OR HAVING LITTLE ENERGY: SEVERAL DAYS
1. LITTLE INTEREST OR PLEASURE IN DOING THINGS: SEVERAL DAYS
9. THOUGHTS THAT YOU WOULD BE BETTER OFF DEAD, OR OF HURTING YOURSELF: NOT AT ALL

## 2024-11-02 ASSESSMENT — PAIN DESCRIPTION - PAIN TYPE
TYPE: ACUTE PAIN

## 2024-11-02 ASSESSMENT — FIBROSIS 4 INDEX: FIB4 SCORE: 1.01

## 2024-11-02 NOTE — DIETARY
"Nutrition Services: Initial Assessment     Day 1 of admit. Rangel Malik is a 53 y.o. male with admitting DX of Peritonsillar abscess [J36]     Consult received for MST score >/= 2 and diet education.     Nutrition Assessment:      Height: 177.8 cm (5' 10\")  Weight: 103 kg (227 lb 1.2 oz)  Weight taken via standing scale  Body mass index is 32.58 kg/m². BMI classification: Obesity Class I.  Wt Readings from Last 6 Encounters:   11/02/24 103 kg (227 lb 1.2 oz)   11/01/24 103 kg (226 lb)   02/08/24 107 kg (235 lb 0.2 oz)   02/08/24 107 kg (235 lb)   06/07/21 125 kg (275 lb)   02/15/21 (!) 128 kg (281 lb 12 oz)      Objective:  Pertinent medical hx:   Past Medical History:   Diagnosis Date    Low back pain 9/26/2011    Rash and nonspecific skin eruption 10/31/2018    Left lower ext    Shortness of breath on exertion 10/31/2018    \"only with long flights of stairs\"     Pertinent labs:   Lab Results   Component Value Date/Time    SODIUM 134 (L) 11/02/2024 01:35 AM    POTASSIUM 4.5 11/02/2024 01:35 AM    CO2 13 (L) 11/02/2024 01:35 AM    CHLORIDE 98 11/02/2024 01:35 AM    BUN 13 11/02/2024 01:35 AM    CREATININE 1.03 11/02/2024 01:35 AM    CALCIUM 9.5 11/02/2024 01:35 AM    MAGNESIUM 2.1 11/17/2020 08:43 AM    GLUCOSE 298 (H) 11/02/2024 01:35 AM      Pertinent meds:   Scheduled Medications   Medication Dose Frequency    insulin lispro  2-9 Units Q6HRS    [START ON 11/3/2024] insulin GLARGINE  15 Units Q EVENING    ampicillin-sulbactam (UNASYN) IV  3 g Q6HRS     Skin/wounds: no pressure injuries identified   Food Allergies: NKFA  Last BM: 11/01/24 (per patient) per flowsheets     Current diet order:   Consistent CHO diet, IDDSI level 0 - thin liquids, and IDDSI level 6 - soft/bite-sized    Subjective:   Patient reported UBW: 230-235# this year   Dietary recall/energy intake: pt reports eating snacks throughout the day at home, such as chips, crackers, and veggies. States he has one meal a day which is dinner. Reports " he drinks 2L of diet soda daily. Does not typically consume a large amount of desserts. Reports his appetite is good currently.   RD provided diabetes diet education. RD discussed dietary sources of carbohydrates, portion sizes, plate method, and diabetes.[email or web address], provided handout reinforcing topics discussed. Pt demonstrated good readiness and good evidence of learning. RD able to answer all questions to patient's satisfaction.     Nutrition Focused Physical Exam (NFPE)   Weight loss: no recent significant wt loss.  Muscle mass: Well-nourished  Subcutaneous fat: Well-nourished  Fluid Accumulation: none noted  Reduced  Strength: N/A in acute care setting.     Nutrition Diagnosis:      Food- and nutrition-related knowledge deficit related to diabetes diet as evidenced by limited prior education/exposure.     Based on RD assessment at this time, pt does not meet criteria in congruence with ASPEN/Academy guidelines for malnutrition.    Nutrition Interventions:      Continue diet as ordered  RD provided verbal and written diabetes diet education today  No other education needs identified at this time. Consider referral to outpatient nutrition services for continuation of education as indicated or per pt preferences.   Patient aware of active plan of care as appropriate.     Nutrition Monitoring and Evaluation:     Monitor nutrition POC  Monitor vital signs pertinent to nutrition     RD will screen weekly per policy.

## 2024-11-02 NOTE — ASSESSMENT & PLAN NOTE
New diabetes  History of prediabetes 2 years ago, however, he lost to follow-up and is not on any diabetes medications    A1c 13.5  BG over 300    I started Lantus 15 units, SSI  11/3 BG still high, I increased Lantus to 21 units  Hypoglycemia protocol  I ordered a diabetes education

## 2024-11-02 NOTE — ED NOTES
Throat swab sent to lab. Pt given small sips of water with Dr. Yan's ok. Updated pt on POC. Call light in reach.

## 2024-11-02 NOTE — PROGRESS NOTES
4 Eyes Skin Assessment Completed by GUDELIA Jacinto and GUDELIA Zhang.    Head WDL  Ears WDL  Nose WDL  Mouth WDL  Neck WDL  Breast/Chest WDL  Shoulder Blades WDL  Spine WDL  (R) Arm/Elbow/Hand Redness and Blanching  (L) Arm/Elbow/Hand Redness and Blanching  Abdomen WDL  Groin WDL  Scrotum/Coccyx/Buttocks Redness and blanching  (R) Leg WDL  (L) Leg Discoloration  (R) Heel/Foot/Toe Redness and Blanching  (L) Heel/Foot/Toe Redness and Blanching          Devices In Places       Interventions In Place Pillows    Possible Skin Injury No    Pictures Uploaded Into Epic Yes  Wound Consult Placed N/A  RN Wound Prevention Protocol Ordered No

## 2024-11-02 NOTE — ED TRIAGE NOTES
"Chief Complaint   Patient presents with    Abscess     Pt sent from  for abscess in the back of his throat.      Pt ambulatory to triage for above complaint. Large abscess on roof of mouth towards the back of pt's throat noted. Pt managing his airway and secretions at time. Pt states voice sounds different but no airway complaints.     A+Ox4, GCS 15    Pt given liquid steroids pta    Placed in lobby. Charge RN notified. Educated on triage process. Encouraged to alert staff to any changes.    BP (!) 147/89   Pulse (!) 106   Temp 36.4 °C (97.5 °F) (Temporal)   Resp 16   Ht 1.778 m (5' 10\")   Wt 103 kg (227 lb 15.3 oz)   SpO2 97%   BMI 32.71 kg/m²     "

## 2024-11-02 NOTE — ED NOTES
Medication history reviewed with PT at bedside    [facility] is complete per PT reporting    Allergies reviewed.     Patient denies any outpatient antibiotics in the last 30 days.     Patient is not taking anticoagulants.    Preferred pharmacy for this visit - [facility] ([phone])

## 2024-11-02 NOTE — ASSESSMENT & PLAN NOTE
Co2 13, AG 23  [place] 5.21, LA 1  VBG pH 7.3, pCO2 36     11/3 anion gap closed, CO2 19, AG 15, potassium 3.8, sodium 130  Continue IV fluid, insulin  Improving, continue Lantus and SSI  Continue aggressive IV fluid

## 2024-11-02 NOTE — CONSULTS
"CC: sore throat    HPI: Rangel Malik is a 53 y.o. male with 3 days of worsening sore throat. +dysphagia and muffled voice initially. In ER found to have PTA and underwent I&D with return of purulence. Feels much better this morning. Able to swallow without pain and only feels mild pressure. Voice no longer muffled. Had prior PTA earlier this year also requiring I&D. Has new DM diagnosis with significant elevation in blood glucose.    Past Medical History:   Diagnosis Date    Low back pain 9/26/2011    Rash and nonspecific skin eruption 10/31/2018    Left lower ext    Shortness of breath on exertion 10/31/2018    \"only with long flights of stairs\"     Past Surgical History:   Procedure Laterality Date    CIRCUMCISION ADULT N/A 2/15/2021    Procedure: CIRCUMCISION, ADULT;  Surgeon: Ashok Collier M.D.;  Location: SURGERY [facility];  Service: Urology    VASECTOMY  2002     No current facility-administered medications on file prior to encounter.     Current Outpatient Medications on File Prior to Encounter   Medication Sig Dispense Refill    acetaminophen (TYLENOL) 500 MG Tab Take 2,000 mg by mouth 1 time a day as needed for Moderate Pain.      ibuprofen (MOTRIN) 200 MG Tab Take 400 mg by mouth every 8 hours as needed for Mild Pain.       No Known Allergies  Family and Occupational History     Socioeconomic History    Marital status:      Spouse name: Not on file    Number of children: Not on file    Years of education: Not on file    Highest education level: Not on file   Occupational History    Not on file         O:  /86   Pulse 86   Temp 36 °C (96.8 °F) (Temporal)   Resp 16   Ht 1.778 m (5' 10\")   Wt 103 kg (227 lb 1.2 oz)   SpO2 95%   Gen: interactive and appropriate  Pulm: Breathing comfortably on RA without stridor or stertor  Face: symmetric, no edema, facial strength intact bilaterally  OC/OP: right soft palate with edema and erythema, still asymmetric but uvula minimally deviated. " Mild fullness to palpation. Dentition intact. Posterior pharynx easily visible. Floor of mouth soft and flat  Neck: flat, no discrete masses  Lymph: no palpable lymphadenopathy in neck or parotid  Neuro: cranial nerves grossly intact bilaterally. Mood appropriate      Recent Labs     11/01/24 2056 11/02/24 0135   WBC 15.7* 10.6   RBC 6.29* 5.71   HEMOGLOBIN 18.5* 16.9   HEMATOCRIT 56.1* 51.1   MCV 89.2 89.5   MCH 29.4 29.6   MCHC 33.0 33.1   RDW 40.4 40.1   PLATELETCT 210 183   MPV 10.9 10.7     Recent Labs     11/01/24 2056 11/02/24 0135   SODIUM 134* 134*   POTASSIUM 4.7 4.5   CHLORIDE 97 98   CO2 15* 13*   GLUCOSE 243* 298*   BUN 9 13   CREATININE 0.97 1.03   CALCIUM 10.0 9.5               A/P:Rangel Malik is a 53 y.o. male with recurrent right PTA and new DM diagnosis. S/p I&D in ER with return of purulence. Feeling much better this am. Exam with some persistent fullness and erythema, but given improvement, opted to continue to treat medically. Ok for oral diet. I will re-evaluate tomorrow. Encourage aggressive glucose control.      Thank you for the consultation. Please call with questions or concerns.    Luz Matute M.D.  [phone]

## 2024-11-02 NOTE — PROGRESS NOTES
"Subjective:   Rangel Malik is a 53 y.o. male who presents for Pharyngitis      HPI:  53-year-old male presents to urgent care for 3 days of right-sided throat pain and swelling.  States that he had something similar to this in the past where he had to have it drained.  Has not had any fever, chills, nausea, vomiting, or shortness of breath.  Still tolerating oral secretions.    Medications:    D3-50 Caps  dexamethasone  VITAMIN D PO    Allergies: Patient has no known allergies.    Problem List: Rangel Malik does not have any pertinent problems on file.    Surgical History:  Past Surgical History:   Procedure Laterality Date    CIRCUMCISION ADULT N/A 2/15/2021    Procedure: CIRCUMCISION, ADULT;  Surgeon: Ashok Collier M.D.;  Location: SURGERY [facility];  Service: Urology    VASECTOMY  2002       Past Social Hx: Rangel Malik  reports that he has never smoked. He has never used smokeless tobacco. He reports current alcohol use. He reports that he does not use drugs.     Past Family Hx:  Rangel Malik family history includes No Known Problems in his father and mother.     Problem list, medications, and allergies reviewed by myself today in Epic.     Objective:     /80 (BP Location: Right arm, Patient Position: Sitting, BP Cuff Size: Adult)   Pulse (!) 109   Temp 37.1 °C (98.7 °F) (Temporal)   Resp 16   Ht 1.753 m (5' 9\")   Wt 103 kg (226 lb)   SpO2 97%   BMI 33.37 kg/m²     Physical Exam  Vitals reviewed.   HENT:      Nose: No congestion or rhinorrhea.      Mouth/Throat:      Mouth: Mucous membranes are moist.      Comments: Peritonsillar swelling on the right side with slight uvular deviation to the left.  Still tolerating oral secretions.  Cardiovascular:      Rate and Rhythm: Normal rate and regular rhythm.      Pulses: Normal pulses.      Heart sounds: Normal heart sounds. No murmur heard.  Pulmonary:      Effort: Pulmonary effort is normal. No respiratory distress.      " Breath sounds: Normal breath sounds. No stridor. No wheezing, rhonchi or rales.         Assessment/Plan:     Diagnosis and associated orders:     1. Pharyngitis, unspecified etiology  dexamethasone (Decadron) injection (check route below) 10 mg         Comments/MDM:     Patient's physical exam findings highly suspicious for right-sided peritonsillar abscess.  States he has had this same thing in the past where he had to have it drained.  Symptoms have been for the past 3 days and only present on the right side.  He was given 10 mg oral Decadron and tolerated this well.  Given high suspicion for peritonsillar abscess, advised that he needs to be evaluated at [facility] emergency department under high-level care.  Patient is agreeable and does not want EMS transport.  He is stable at the time of leaving urgent care and displaying no signs of airway compromise.         Differential diagnosis, natural history, supportive care, and indications for immediate follow-up discussed.    Advised the patient to follow-up with the primary care physician for recheck, reevaluation, and consideration of further management.    Please note that this dictation was created using voice recognition software. I have made a reasonable attempt to correct obvious errors, but I expect that there are errors of grammar and possibly content that I did not discover before finalizing the note.    Electronically signed by Kehinde Gibbons PA-C.

## 2024-11-02 NOTE — H&P
Hospital Medicine History & Physical Note    Date of Service  11/1/2024    Primary Care Physician  Pcp Pt States None    Consultants  ENT    Code Status  Full Code    Chief Complaint  Chief Complaint   Patient presents with    Abscess     Pt sent from  for abscess in the back of his throat.        History of Presenting Illness  Rangel Malik is a 53 y.o. male who presented 11/1/2024 with a peritonsillar abscess.  For the last 3 days, patient noted to have a sore throat, chills, myalgia.  He did begin to note that it was difficult to swallow some of his foods.  He denies any shortness of breath.  [facility]    In ER, patient found to be tachycardic.  Found to have leukocytosis and new onset diabetes.  CT neck showed right-sided peritonsillar abscess.  I&D performed in ER.  ENT on board, patient will be n.p.o. at midnight.    I discussed the plan of care with patient.    Review of Systems  Review of Systems   Constitutional:  Positive for chills. Negative for fever.   HENT:  Positive for sore throat.    Eyes: Negative.    Respiratory: Negative.     Cardiovascular: Negative.    Gastrointestinal: Negative.    Genitourinary: Negative.    Musculoskeletal: Negative.    Skin: Negative.    Neurological: Negative.    Endo/Heme/Allergies: Negative.    Psychiatric/Behavioral: Negative.         Past Medical History   has a past medical history of Low back pain (9/26/2011), Rash and nonspecific skin eruption (10/31/2018), and Shortness of breath on exertion (10/31/2018).    Surgical History   has a past surgical history that includes vasectomy (2002) and circumcision adult (N/A, 2/15/2021).     Family History  family history includes No Known Problems in his father and mother.   Family history reviewed with patient. There is no family history that is pertinent to the chief complaint.     Social History   reports that he has never smoked. He has never used  smokeless tobacco. He reports current alcohol use. He reports that he does not use drugs.    Allergies  No Known Allergies    Medications  Prior to Admission Medications   Prescriptions Last Dose Informant Patient Reported? Taking?   acetaminophen (TYLENOL) 500 MG Tab 10/31/2024 at [facility] Patient Yes Yes   Sig: Take 2,000 mg by mouth 1 time a day as needed for Moderate Pain.   ibuprofen (MOTRIN) 200 MG Tab 3 WEEKS AGO at [facility] Patient Yes Yes   Sig: Take 400 mg by mouth every 8 hours as needed for Mild Pain.      Facility-Administered Medications: None       Physical Exam  Temp:  [36.4 °C (97.5 °F)-37.1 °C (98.7 °F)] 36.4 °C (97.5 °F)  Pulse:  [106-109] 106  Resp:  [16] 16  BP: (112-147)/(80-89) 147/89  SpO2:  [97 %] 97 %  Blood Pressure: (!) 147/89   Temperature: 36.4 °C (97.5 °F)   Pulse: (!) 106   Respiration: 16   Pulse Oximetry: 97 %       Physical Exam  Constitutional:       Appearance: Normal appearance. He is obese.   HENT:      Head: Normocephalic.      Nose: Nose normal.      Mouth/Throat:      Mouth: Mucous membranes are moist.      Comments: Patient has uvular deviation to the left  Right tonsil is erythematous   Cardiovascular:      Rate and Rhythm: Regular rhythm. Tachycardia present.      Pulses: Normal pulses.   Pulmonary:      Effort: Pulmonary effort is normal. No respiratory distress.      Breath sounds: Normal breath sounds. No stridor. No wheezing or rales.   Abdominal:      General: Abdomen is flat. Bowel sounds are normal.      Palpations: Abdomen is soft.   Musculoskeletal:         General: Normal range of motion.      Cervical back: Neck supple.   Skin:     General: Skin is warm.   Neurological:      General: No focal deficit present.      Mental Status: He is alert and oriented to person, place, and time. Mental status is at baseline.   Psychiatric:         Mood and Affect: Mood normal.         Behavior: Behavior normal.         Thought Content: Thought content normal.         Judgment: Judgment  "normal.         Laboratory:  Recent Labs     11/01/24 2056   WBC 15.7*   RBC 6.29*   HEMOGLOBIN 18.5*   HEMATOCRIT 56.1*   MCV 89.2   MCH 29.4   MCHC 33.0   RDW 40.4   PLATELETCT 210   MPV 10.9     Recent Labs     11/01/24 2056   SODIUM 134*   POTASSIUM 4.7   CHLORIDE 97   CO2 15*   GLUCOSE 243*   BUN 9   CREATININE 0.97   CALCIUM 10.0     Recent Labs     11/01/24 2056   ALTSGPT 7   ASTSGOT 11*   ALKPHOSPHAT 92   TBILIRUBIN 0.7   GLUCOSE 243*         No results for input(s): \"NTPROBNP\" in the last 72 hours.      No results for input(s): \"TROPONINT\" in the last 72 hours.    Imaging:  CT-SOFT TISSUE NECK WITH   Final Result      Right peritonsillar 2.9 x 1.8 cm abscess          no X-Ray or EKG requiring interpretation    Assessment/Plan:  Justification for Admission Status  I anticipate this patient will require at least two midnights for appropriate medical management, necessitating inpatient admission because patient has peritonsillar abscess    Patient will need a Med/Surg bed on MEDICAL service .  The need is secondary to peritonsillar abscess.    * Peritonsillar abscess  Assessment & Plan  Patient has right peritonsillar abscess 2.9 x 1.8 cm. I and D performed in ed  Unasyn, Decadron 4 mg every 6 hours  Toradol as needed  Send swab of throat for Strep   ENT consulted, n.p.o. midnight      SIRS (systemic inflammatory response syndrome) (HCC)  Assessment & Plan  SIRS criteria identified on my evaluation include:  Tachycardia, with heart rate greater than 90 BPM and Leukocytosis, with WBC greater than 12,000      Hyperglycemia  Assessment & Plan  New diagnosis of diabetes, A1c 13.5  Patient is borderline DKA with bicarbonate of 15 and anion gap of 22  Will give 1 amp of bicarbonate now, 5 units of IV insulin, then sliding scale  Will give 1 L of fluid bolus, repeat BMP in 4 hours to ensure resolution of DKA        Patient is critically ill.   The patient continues to have: Hyperglycemia with mild DKA, " Peritonsillar abscess wl uvula deviation, Acidosis  The vital organ system that is affected is the: Cardiovascular system, respiratory system  If untreated there is a high chance of deterioration into: DKA with coma, sepsis  And eventually death.   The critical care that I am providing today is: 1 amp of bicarbonate, 1 L of fluids, IV Unasyn, IV Decadron, 5 units IV insulin, fluid restriction  The critical that has been undertaken is medically complex.   There has been no overlap in critical care time.   Critical Care Time not including procedures: 34      VTE prophylaxis: pharmacologic prophylaxis contraindicated due to OR in AM

## 2024-11-02 NOTE — ASSESSMENT & PLAN NOTE
Patient has right peritonsillar abscess 2.9 x 1.8 cm.   I and D performed at the ER, 3 cc pus aspirated  Received 1 dose of IV Decadron   Started on Unasyn    Strep A PCR negative    11/3 reported worsening swelling and pain, plan to repeat I&D today  Culture grows MSSA  Continue Unasyn

## 2024-11-02 NOTE — PROGRESS NOTES
[facility] Medicine Daily Progress Note    Date of Service  11/2/2024    Chief Complaint  Rangel Malik is a 53 y.o. male admitted 11/1/2024 with peritonsillar abscess    Hospital Course  Rangel Malik is a 53 y.o. male with history of prediabetes who presented 11/1/2024 with a peritonsillar abscess.     In ER, patient found to be tachycardic.  Found to have leukocytosis and new onset diabetes.  CT neck showed right-sided peritonsillar 2.9 x 1.8 cm abscess.  I&D performed in ER.  ENT consulted    Strep A negative    Interval Problem Update  I saw and examined the patient at bedside  Care plan was discussed with the wife at the bedside  Patient feels better, less throat pain.     I discussed with the ENT, continue IV antibiotics, no repeated I&D needed at this point.  Patient is okay to eat    Co2 13, ag 23  Bg 280  Wbc 15>10  Lantus 15, ssi  K 4.5  ENT  Keton 5.21, LA 1      I have discussed this patient's plan of care and discharge plan at IDT rounds today with Case Management, Nursing, Nursing leadership, and other members of the IDT team.    Consultants/Specialty  ENT    Code Status  Full Code    Disposition  The patient is not medically cleared for discharge to home or a post-acute facility.      I have placed the appropriate orders for post-discharge needs.    Review of Systems  All 12 systems were reviewed and negative except as mentioned above      Physical Exam  Temp:  [36 °C (96.8 °F)-37.1 °C (98.7 °F)] 36.6 °C (97.9 °F)  Pulse:  [] 79  Resp:  [16-17] 17  BP: (112-147)/(62-89) 124/75  SpO2:  [92 %-97 %] 95 %    Physical Exam  Constitutional:       Appearance: He is obese. He is ill-appearing.   HENT:      Head: Normocephalic.      Nose: Nose normal.      Mouth/Throat:      Mouth: Mucous membranes are moist.      Comments: Right-sided peritonsillar abscess  Eyes:      Extraocular Movements: Extraocular movements intact.      Conjunctiva/sclera: Conjunctivae normal.   Cardiovascular:      Rate  and Rhythm: Normal rate and regular rhythm.      Pulses: Normal pulses.      Heart sounds: Normal heart sounds.   Pulmonary:      Effort: Pulmonary effort is normal.      Breath sounds: Normal breath sounds.   Abdominal:      General: Bowel sounds are normal.      Palpations: Abdomen is soft.      Tenderness: There is no abdominal tenderness. There is no guarding.   Musculoskeletal:         General: No swelling or tenderness.      Cervical back: Normal range of motion.   Skin:     General: Skin is warm.   Neurological:      Mental Status: He is alert and oriented to person, place, and time. Mental status is at baseline.   Psychiatric:         Mood and Affect: Mood normal.         Fluids    Intake/Output Summary (Last 24 hours) at 11/2/2024 1604  Last data filed at 11/2/2024 0652  Gross per 24 hour   Intake 578.36 ml   Output --   Net 578.36 ml        Laboratory  Recent Labs     11/01/24 2056 11/02/24 0135   WBC 15.7* 10.6   RBC 6.29* 5.71   HEMOGLOBIN 18.5* 16.9   HEMATOCRIT 56.1* 51.1   MCV 89.2 89.5   MCH 29.4 29.6   MCHC 33.0 33.1   RDW 40.4 40.1   PLATELETCT 210 183   MPV 10.9 10.7     Recent Labs     11/01/24 2056 11/02/24 0135 11/02/24  1518   SODIUM 134* 134* 133*   POTASSIUM 4.7 4.5 4.3   CHLORIDE 97 98 100   CO2 15* 13* 14*   GLUCOSE 243* 298* 259*   BUN 9 13 14   CREATININE 0.97 1.03 0.95   CALCIUM 10.0 9.5 9.2                   Imaging  CT-SOFT TISSUE NECK WITH   Final Result      Right peritonsillar 2.9 x 1.8 cm abscess           Assessment/Plan  * Peritonsillar abscess  Assessment & Plan  Patient has right peritonsillar abscess 2.9 x 1.8 cm.   I and D performed at the ER, 3 cc pus aspirated  Received 1 dose of IV Decadron   Started on Unasyn    Strep A PCR negative    I discussed with the ENT, continue IV antibiotics, no repeated I&D needed at this point.  Patient is okay to eat  Follow-up  culture    DKA (diabetic ketoacidosis) ([facility])  Assessment & Plan  Co2 13, AG 23  Keton 5.21, LA 1  VBG pH 7.3,  pCO2 36     Repeated BMP showed CO2 14, AG 19, potassium 4.3, sodium 133,   Monitor BMP every 4 hours    Improving, continue Lantus and SSI  Continue aggressive IV fluid    Class 1 obesity in adult  Assessment & Plan  BMI 32  Weight loss education     SIRS (systemic inflammatory response syndrome) (HCC)  Assessment & Plan  SIRS criteria identified on my evaluation include:  Tachycardia, with heart rate greater than 90 BPM and Leukocytosis, with WBC greater than 12,000      Uncontrolled diabetes mellitus with hyperglycemia (HCC)  Assessment & Plan  New diabetes  History of prediabetes 2 years ago, however, he lost to follow-up and is not on any diabetes medications    A1c 13.5  BG over 300    I started Lantus 15 units, SSI  Hypoglycemia protocol  I ordered a diabetes education         VTE prophylaxis: scd    I have performed a physical exam and reviewed and updated ROS and Plan today (11/2/2024). In review of yesterday's note (11/1/2024), there are no changes except as documented above.    I spent greater than 54 minutes for chart review, obtaining history independently, performing medically appropriate examination,  documenting , ordering medications, tests, or procedures, referring and communicating with other health care professionals, Independently interpreting results and communicating results with patient/family/caregiver. More than 50% of time was spent in face-to-face clinical encounter.

## 2024-11-02 NOTE — ED PROVIDER NOTES
"  ER Provider Note    Scribed for Zaria Ramírez M.d. by Kirsten Greenberg. 11/1/2024  8:12 PM    Primary Care Provider: None noted     CHIEF COMPLAINT  Chief Complaint   Patient presents with    Abscess     Pt sent from  for abscess in the back of his throat.      LIMITATION TO HISTORY   Select: : None    HPI/ROS  OUTSIDE HISTORIAN(S):  Significant other Wife at bedside, assisting in providing history as seen below.     EXTERNAL RECORDS REVIEWED  Care everywhere ED note from 2/8/24: seen for peritonsillar abscess. Peritonsillar Abscess Needle Aspiration performed - 6 cc aspirated. DC home with an rx of Augmentin. Patient given dexamethasone 10 mg IV today at .    Rangel Malik is a 53 y.o. male who presents to the ED for abscess onset three days ago. Patient was sent from urgent care for an abscess. Patient reports history of similar symptoms, confirmed in external records above. Patient reports sore throat, difficulty swallowing, and chills. He has not been able to eat at all today.  Wife reports associated ear pain. He has been drinking water with some difficulty.Denies, chest pain, shortness of breath, fever, abdominal pain, or neck pain. Patient notes of a headache for the past couple of days. Wife just got over a cold. He has not seen an ENT yet. Denies allergies to medication.      PAST MEDICAL HISTORY  Past Medical History:   Diagnosis Date    Low back pain 9/26/2011    Rash and nonspecific skin eruption 10/31/2018    Left lower ext    Shortness of breath on exertion 10/31/2018    \"only with long flights of stairs\"       SURGICAL HISTORY  Past Surgical History:   Procedure Laterality Date    CIRCUMCISION ADULT N/A 2/15/2021    Procedure: CIRCUMCISION, ADULT;  Surgeon: Ashok Collier M.D.;  Location: SURGERY [facility];  Service: Urology    VASECTOMY  2002       FAMILY HISTORY  Family History   Problem Relation Age of Onset    No Known Problems Mother     No Known Problems Father     Cancer Neg Hx     " "Diabetes Neg Hx     Heart Disease Neg Hx     Hypertension Neg Hx     Hyperlipidemia Neg Hx     Stroke Neg Hx        SOCIAL HISTORY   reports that he has never smoked. He has never used smokeless tobacco. He reports current alcohol use. He reports that he does not use drugs.    CURRENT MEDICATIONS  Previous Medications    ACETAMINOPHEN (TYLENOL) 500 MG TAB    Take 2,000 mg by mouth 1 time a day as needed for Moderate Pain.    IBUPROFEN (MOTRIN) 200 MG TAB    Take 400 mg by mouth every 8 hours as needed for Mild Pain.       ALLERGIES  Patient has no known allergies.    PHYSICAL EXAM  BP (!) 147/89   Pulse (!) 106   Temp 36.4 °C (97.5 °F) (Temporal)   Resp 16   Ht 1.778 m (5' 10\")   Wt 103 kg (227 lb 15.3 oz)   SpO2 97%   BMI 32.71 kg/m²     Constitutional: Alert in no apparent distress. Hoarse voice  HENT: Normocephalic, Atraumatic, Bilateral external ears normal. Nose normal.  Large right-sided peritonsillar abscess seen with uvula deviation and tonsillar pillar flattening.  Thrush seen on the tongue.  Bilateral submandibular lymphadenopathy  Eyes:  Conjunctiva normal, non-icteric.   Lungs: Non-labored respirations, clear to auscultation  Heart: Regular rate and rhythm no murmurs rubs or gallops  Skin: Warm, Dry, No erythema, No rash.   Neurologic: Alert, Grossly non-focal.   Psychiatric: Affect normal, Judgment normal, Mood normal, Appears appropriate and not intoxicated.           DIAGNOSTIC STUDIES & PROCEDURES    Labs:   Results for orders placed or performed during the hospital encounter of 11/01/24   CBC WITH DIFFERENTIAL    Collection Time: 11/01/24  8:56 PM   Result Value Ref Range    WBC 15.7 (H) 4.8 - 10.8 K/uL    RBC 6.29 (H) 4.70 - 6.10 M/uL    Hemoglobin 18.5 (H) 14.0 - 18.0 g/dL    Hematocrit 56.1 (H) 42.0 - 52.0 %    MCV 89.2 81.4 - 97.8 fL    MCH 29.4 27.0 - 33.0 pg    MCHC 33.0 32.3 - 36.5 g/dL    RDW 40.4 35.9 - 50.0 fL    Platelet Count 210 164 - 446 K/uL    MPV 10.9 9.0 - 12.9 fL    " Neutrophils-Polys 89.30 (H) 44.00 - 72.00 %    Lymphocytes 7.10 (L) 22.00 - 41.00 %    Monocytes 2.80 0.00 - 13.40 %    Eosinophils 0.10 0.00 - 6.90 %    Basophils 0.30 0.00 - 1.80 %    Immature Granulocytes 0.40 0.00 - 0.90 %    Nucleated RBC 0.00 0.00 - 0.20 /100 WBC    Neutrophils (Absolute) 14.04 (H) 1.82 - 7.42 K/uL    Lymphs (Absolute) 1.12 1.00 - 4.80 K/uL    Monos (Absolute) 0.44 0.00 - 0.85 K/uL    Eos (Absolute) 0.01 0.00 - 0.51 K/uL    Baso (Absolute) 0.04 0.00 - 0.12 K/uL    Immature Granulocytes (abs) 0.07 0.00 - 0.11 K/uL    NRBC (Absolute) 0.00 K/uL   COMP METABOLIC PANEL    Collection Time: 11/01/24  8:56 PM   Result Value Ref Range    Sodium 134 (L) 135 - 145 mmol/L    Potassium 4.7 3.6 - 5.5 mmol/L    Chloride 97 96 - 112 mmol/L    Co2 15 (L) 20 - 33 mmol/L    Anion Gap 22.0 (H) 7.0 - 16.0    Glucose 243 (H) 65 - 99 mg/dL    Bun 9 8 - 22 mg/dL    Creatinine 0.97 0.50 - 1.40 mg/dL    Calcium 10.0 8.5 - 10.5 mg/dL    Correct Calcium 9.5 8.5 - 10.5 mg/dL    AST(SGOT) 11 (L) 12 - 45 U/L    ALT(SGPT) 7 2 - 50 U/L    Alkaline Phosphatase 92 30 - 99 U/L    Total Bilirubin 0.7 0.1 - 1.5 mg/dL    Albumin 4.6 3.2 - 4.9 g/dL    Total Protein 8.8 (H) 6.0 - 8.2 g/dL    Globulin 4.2 (H) 1.9 - 3.5 g/dL    A-G Ratio 1.1 g/dL   ESTIMATED GFR    Collection Time: 11/01/24  8:56 PM   Result Value Ref Range    GFR (CKD-EPI) 94 >60 mL/min/1.73 m 2   HEMOGLOBIN A1C    Collection Time: 11/01/24  8:56 PM   Result Value Ref Range    Glycohemoglobin 13.5 (H) 4.0 - 5.6 %    Est Avg Glucose 341 mg/dL     All labs reviewed by me.    Radiology:   The attending Emergency Physician has independently interpreted the diagnostic imaging associated with this visit and is awaiting the final reading from the radiologist, which will be displayed below.    Preliminary interpretation is a follows: PTA seen on the right on CT  Radiologist interpretation:   CT-SOFT TISSUE NECK WITH   Final Result      Right peritonsillar 2.9 x 1.8 cm abscess            Procedures:    Peritonsillar Abscess Needle Aspiration Procedure Note   INCISION AND DRAINAGE PROCEDURE NOTE:  Patient identification was confirmed and consent was obtained.    Site: Right peritonsillar pillar  Needle size: 18-gauge and 20-gauge spinal needle  Cetacaine was used to achieve topical anesthesia, 1% lidocaine with epi was used for anesthesia.  Initially an 18-gauge spinal needle with a guard at approximately 2 cm was used to attempt to aspirate the peritonsillar abscess.  Attempt x 2 was unsuccessful.  Third attempt was obtained with a 20-gauge spinal needle with guarded 2 cm and 2 cc of purulent fluid was able to be aspirated.  This was sent for appropriate testing.        COURSE & MEDICAL DECISION MAKING    ED Observation Status? No; Patient does not meet criteria for ED Observation.     8:12 PM - Patient seen and evaluated at bedside by MS4. Patient will be treated with ampcillin 3 mg IV and ketorolac 15 mg IV for his symptoms. Ordered CMP, CBC w/ diff, and CT-Soft tissue neck with  to evaluate. He understands and agrees to the plan of care. Differential diagnoses include but are not limited to: Tonsillar abscess    8:39 PM - I re-evaluated patient.     INITIAL ASSESSMENT AND PLAN  Care Narrative: This is a 53-year-old gentleman who presents with recurrent right sided peritonsillar abscess.  Patient had the same in February sick cc was drained he appeared to heal without issue after antibiotics but never followed up with ENT.  He is now having recurrent symptoms.  He has an obvious peritonsillar abscess on exam as well as CT.  I did speak with Dr. Matute, ENT.  Patient also has evidence of new onset diabetes with an elevated A1c his CMP shows perhaps even a borderline DKA he has a got a glucose in the 200s with a bicarb 15 and a slightly elevated anion gap.  He was has been given dexamethasone at urgent care which likely will make his sugar go up even more.  Given he has no treatment for  this underlying diabetes he has evidence of thrush, with an active infection I do think he requires hospitalization for IV antibiotics, glucose control and ENT consultation.  ENT will see the patient in the morning.  I did successfully aspirate a few cc of purulent material he was placed on antibiotics.  I spoke with Dr. Yan who is agreeable to consult for hospitalization.  Patient will be hospitalized in guarded condition.      Hydration: Based on the patient's presentation of Hyperglycemia the patient was given IV fluids. IV Hydration was used because oral hydration was not adequate alone. Upon recheck following hydration, the patient was improving.                 DISPOSITION AND DISCUSSIONS  I have discussed management of the patient with the following physicians and LORRAINE's: Giovani Henriquez        FINAL IMPRESSION   1. Peritonsillar abscess    2. Type 2 diabetes mellitus with hyperglycemia, unspecified whether long term insulin use (HCC)    3. Thrush        Kirsten KEE (Reanna), am scribing for, and in the presence of, Zaria Ramírez M.D..    Electronically signed by: Kirsten Greenberg (Reanna), 11/1/2024    IZaria M.D. personally performed the services described in this documentation, as scribed by Kirsten Greenberg in my presence, and it is both accurate and complete.    The note accurately reflects work and decisions made by me.  Zaria Ramírez M.D.  11/1/2024  11:20 PM

## 2024-11-02 NOTE — DISCHARGE INSTR - DIET
Carbohydrate Counting for People With Diabetes  Foods with carbohydrates make your blood glucose level go up. Learning how to count carbohydrates can help you control your blood glucose levels. First, identify the foods you eat that contain carbohydrates. Then, using the Foods with Carbohydrates chart, determine about how much carbohydrates are in your meals and snacks. Make sure you are eating foods with fiber, protein, and healthy fat along with your carbohydrate foods.    Foods with Carbohydrates  The following table shows carbohydrate foods that have about 15 grams of carbohydrate each. Using measuring cups, spoons, or a food scale when you first begin learning about carbohydrate counting can help you learn about the portion sizes you typically eat.      The following foods have 15 grams carbohydrate each:    Grains  1 slice bread (1 ounce)  1 small tortilla (6-inch size)  ¼ large bagel (1 ounce)  1/3 cup pasta or rice (cooked)  ½ hamburger or hot dog bun (¾ ounce)  ½ cup cooked cereal  ½ to ¾ cup ready-to-eat cereal  2 taco shells (5-inch size)    Fruit  1 small fresh fruit (¾ to 1 cup)  ½ medium banana  17 small grapes (3 ounces)  1 cup melon or berries  ½ cup canned or frozen fruit  2 tablespoons dried fruit (blueberries, cherries, cranberries, raisins)  ½ cup unsweetened fruit juice    Starchy Vegetables  ½ cup cooked beans, peas, corn, potatoes/sweet potatoes  ¼ large baked potato (3 ounces)  1 cup acorn or butternut squash    Snack Foods  3 to 6 crackers  8 potato chips or 13 tortilla chips (¾ ounce to 1 ounce)  3 cups popped popcorn    Dairy  3/4 cup (6 ounces) nonfat plain yogurt, or yogurt with sugar-free sweetener  1 cup milk  1 cup plain rice, soy, coconut or flavored almond milk    Sweets and Desserts  ½ cup ice cream or frozen yogurt  1 tablespoon jam, jelly, pancake syrup, table sugar, or honey  2 tablespoons light pancake syrup  1 inch square of frosted cake or 2 inch square of unfrosted cake  2  small cookies (2/3 ounce each) or ¼ large cookie    Sometimes you’ll have to estimate carbohydrate amounts if you don’t know the exact recipe. One cup of mixed foods like soups can have 1 to 2 carbohydrate servings, while some casseroles might have 2 or more servings of carbohydrate.    Foods that have less than 20 calories in each serving can be counted as “free” foods. Count 1 cup raw vegetables, or ½ cup cooked non-starchy vegetables as “free” foods. If you eat 3 or more servings at one meal, then count them as 1 carbohydrate serving.    Foods without Carbohydrates  Not all foods contain carbohydrates. Meat, some dairy, fats, non-starchy vegetables, and many beverages don’t contain carbohydrate. So when you count carbohydrates, you can generally exclude chicken, pork, beef, fish, seafood, eggs, tofu, cheese, butter, sour cream, avocado, nuts, seeds, olives, mayonnaise, water, black coffee, unsweetened tea, and zero-calorie drinks. Vegetables with no or low carbohydrate include green beans, cauliflower, tomatoes, and onions.    How much carbohydrate should I eat at each meal?  Carbohydrate counting can help you plan your meals and manage your weight. Following are some starting points for carbohydrate intake at each meal. Work with your registered dietitian nutritionist to find the best range that works for your blood glucose and weight.       Women  2 - 3 carb servings (To Lose Weight)  3 - 4 carb servings (To Maintain Weight)    Men  3 - 4 carb servings (To Lose Weight)  4 - 5 carb servings (To Maintain Weight)      Checking your blood glucose after meals will help you know if you need to adjust the timing, type, or number of carbohydrate servings in your meal plan. Achieve and keep a healthy body weight by balancing your food intake and physical activity.         How should I plan my meals?  Plan for half the food on your plate to include non-starchy vegetables, like salad greens, broccoli, or carrots. Try to  eat 3 to 5 servings of non-starchy vegetables every day. Have a protein food at each meal. Protein foods include chicken, fish, meat, eggs, or beans (note that beans contain carbohydrate). These two food groups (non-starchy vegetables and proteins) are low in carbohydrate. If you fill up your plate with these foods, you will eat less carbohydrate but still fill up your stomach. Try to limit your carbohydrate portion to ¼ of the plate.    What fats are healthiest to eat?  Diabetes increases risk for heart disease. To help protect your heart, eat more healthy fats, such as olive oil, nuts, and avocado. Eat less saturated fats like butter, cream, and high-fat meats, like bonilla and sausage. Avoid trans fats, which are in all foods that list “partially hydrogenated oil” as an ingredient.      What should I drink?  Choose drinks that are not sweetened with sugar. The healthiest choices are water, carbonated or seltzer childress, and tea and coffee without added sugars.    Sweet drinks will make your blood glucose go up very quickly. One serving of soda or energy drink is ½ cup. It is best to drink these beverages only if your blood glucose is low.    Artificially sweetened, or diet drinks, typically do not increase your blood glucose if they have zero calories in them. Read labels of beverages, as some diet drinks do have carbohydrate and will raise your blood glucose.    Label Reading Tips  Read Nutrition Facts labels to find out how many grams of carbohydrate are in a food you want to eat. Don’t forget: sometimes serving sizes on the label aren’t the same as how much food you are going to eat, so you may need to calculate how much carbohydrate is in the food you are serving yourself.        The Nutrition Facts information is based on a standard serving size. However, that serving size may not be the same as the serving size used in carbohydrate counting.  Always start by checking the serving size on the label. Is this the  serving size you will be eating? How many servings are in the package?  Next, look at the total carbohydrate. It is measured in grams (g). To find the number of carbohydrate servings in 1 standard serving of a food, divide the total grams of carbohydrate by 15. One (1) carbohydrate serving is the amount of food with 15 g carbohydrate.  You don’t need to count grams of sugars. They are included in the total carbohydrate.  The label shows how many calories are in the standard serving. It also lists the amount of fat, cholesterol, sodium, protein, and some vitamins and minerals. Talk to your registered dietitian nutritionist or diabetes educator to learn about your goals for these nutrients.  Look below the line listing total fat to find out how much of that fat is saturated fat or trans fat. Choose foods that are low in these kinds of fats because they are not healthy for your heart. In the foods that are healthiest for your heart, grams of saturated fat and trans fat are less than one-third of the total fat grams.  If foods are very low in calories (less than 20 calories per serving) or carbohydrates (5 g carbohydrate or less per serving), you may not need to count them when you count carbohydrates. Ask your registered dietitian nutritionist or diabetes educator about these “free” foods.        Nutrition Counseling  Our expert team offers:   Medical Nutrition Therapy for Chronic Conditions   Weight Management   Diabetes Education and Management   Wellness Services   Body Composition Measurements   Gastrointestinal Health    Nutrition Counseling Services are located at:  [address] [place] [id number]  For more information and to schedule a consultation, please call [phone].  A physician referral may be required by your insurance for coverage.

## 2024-11-02 NOTE — ED NOTES
Transport at bedside to transport pt to [address] bed 1. Pt alert and oriented with even and regular respirations on room air. Fluids continued to floor. Pt with all belongings and chart. Report called to receiving RN.

## 2024-11-03 PROBLEM — E78.5 HYPERLIPIDEMIA: Status: ACTIVE | Noted: 2024-11-03

## 2024-11-03 LAB
ANION GAP SERPL CALC-SCNC: 11 MMOL/L (ref 7–16)
ANION GAP SERPL CALC-SCNC: 13 MMOL/L (ref 7–16)
ANION GAP SERPL CALC-SCNC: 13 MMOL/L (ref 7–16)
ANION GAP SERPL CALC-SCNC: 15 MMOL/L (ref 7–16)
BACTERIA WND AEROBE CULT: ABNORMAL
BACTERIA WND AEROBE CULT: ABNORMAL
BUN SERPL-MCNC: 12 MG/DL (ref 8–22)
BUN SERPL-MCNC: 12 MG/DL (ref 8–22)
BUN SERPL-MCNC: 14 MG/DL (ref 8–22)
BUN SERPL-MCNC: 16 MG/DL (ref 8–22)
CALCIUM SERPL-MCNC: 8.4 MG/DL (ref 8.5–10.5)
CALCIUM SERPL-MCNC: 8.7 MG/DL (ref 8.5–10.5)
CALCIUM SERPL-MCNC: 8.9 MG/DL (ref 8.5–10.5)
CALCIUM SERPL-MCNC: 9 MG/DL (ref 8.5–10.5)
CHLORIDE SERPL-SCNC: 102 MMOL/L (ref 96–112)
CHLORIDE SERPL-SCNC: 103 MMOL/L (ref 96–112)
CHLORIDE SERPL-SCNC: 104 MMOL/L (ref 96–112)
CHLORIDE SERPL-SCNC: 104 MMOL/L (ref 96–112)
CHOLEST SERPL-MCNC: 211 MG/DL (ref 100–199)
CO2 SERPL-SCNC: 16 MMOL/L (ref 20–33)
CO2 SERPL-SCNC: 18 MMOL/L (ref 20–33)
CO2 SERPL-SCNC: 19 MMOL/L (ref 20–33)
CO2 SERPL-SCNC: 22 MMOL/L (ref 20–33)
CREAT SERPL-MCNC: 0.66 MG/DL (ref 0.5–1.4)
CREAT SERPL-MCNC: 0.8 MG/DL (ref 0.5–1.4)
CREAT SERPL-MCNC: 0.82 MG/DL (ref 0.5–1.4)
CREAT SERPL-MCNC: 0.94 MG/DL (ref 0.5–1.4)
ERYTHROCYTE [DISTWIDTH] IN BLOOD BY AUTOMATED COUNT: 41.4 FL (ref 35.9–50)
GFR SERPLBLD CREATININE-BSD FMLA CKD-EPI: 106 ML/MIN/1.73 M 2
GFR SERPLBLD CREATININE-BSD FMLA CKD-EPI: 106 ML/MIN/1.73 M 2
GFR SERPLBLD CREATININE-BSD FMLA CKD-EPI: 113 ML/MIN/1.73 M 2
GFR SERPLBLD CREATININE-BSD FMLA CKD-EPI: 98 ML/MIN/1.73 M 2
GLUCOSE BLD STRIP.AUTO-MCNC: 197 MG/DL (ref 65–99)
GLUCOSE BLD STRIP.AUTO-MCNC: 210 MG/DL (ref 65–99)
GLUCOSE BLD STRIP.AUTO-MCNC: 281 MG/DL (ref 65–99)
GLUCOSE SERPL-MCNC: 208 MG/DL (ref 65–99)
GLUCOSE SERPL-MCNC: 226 MG/DL (ref 65–99)
GLUCOSE SERPL-MCNC: 250 MG/DL (ref 65–99)
GLUCOSE SERPL-MCNC: 254 MG/DL (ref 65–99)
GRAM STN SPEC: ABNORMAL
GRAM STN SPEC: NORMAL
HCT VFR BLD AUTO: 45.4 % (ref 42–52)
HDLC SERPL-MCNC: 42 MG/DL
HGB BLD-MCNC: 15.5 G/DL (ref 14–18)
LDLC SERPL CALC-MCNC: 132 MG/DL
MAGNESIUM SERPL-MCNC: 1.9 MG/DL (ref 1.5–2.5)
MCH RBC QN AUTO: 30.9 PG (ref 27–33)
MCHC RBC AUTO-ENTMCNC: 34.1 G/DL (ref 32.3–36.5)
MCV RBC AUTO: 90.4 FL (ref 81.4–97.8)
PHOSPHATE SERPL-MCNC: 2.5 MG/DL (ref 2.5–4.5)
PLATELET # BLD AUTO: 182 K/UL (ref 164–446)
PMV BLD AUTO: 10.7 FL (ref 9–12.9)
POTASSIUM SERPL-SCNC: 3.8 MMOL/L (ref 3.6–5.5)
POTASSIUM SERPL-SCNC: 3.8 MMOL/L (ref 3.6–5.5)
POTASSIUM SERPL-SCNC: 3.9 MMOL/L (ref 3.6–5.5)
POTASSIUM SERPL-SCNC: 4.1 MMOL/L (ref 3.6–5.5)
RBC # BLD AUTO: 5.02 M/UL (ref 4.7–6.1)
SIGNIFICANT IND 70042: ABNORMAL
SIGNIFICANT IND 70042: NORMAL
SITE SITE: ABNORMAL
SITE SITE: NORMAL
SODIUM SERPL-SCNC: 133 MMOL/L (ref 135–145)
SODIUM SERPL-SCNC: 134 MMOL/L (ref 135–145)
SODIUM SERPL-SCNC: 136 MMOL/L (ref 135–145)
SODIUM SERPL-SCNC: 137 MMOL/L (ref 135–145)
SOURCE SOURCE: ABNORMAL
SOURCE SOURCE: NORMAL
TRIGL SERPL-MCNC: 186 MG/DL (ref 0–149)
WBC # BLD AUTO: 11 K/UL (ref 4.8–10.8)

## 2024-11-03 PROCEDURE — 700102 HCHG RX REV CODE 250 W/ 637 OVERRIDE(OP): Performed by: STUDENT IN AN ORGANIZED HEALTH CARE EDUCATION/TRAINING PROGRAM

## 2024-11-03 PROCEDURE — 80048 BASIC METABOLIC PNL TOTAL CA: CPT | Mod: 91

## 2024-11-03 PROCEDURE — 700105 HCHG RX REV CODE 258: Performed by: STUDENT IN AN ORGANIZED HEALTH CARE EDUCATION/TRAINING PROGRAM

## 2024-11-03 PROCEDURE — 700111 HCHG RX REV CODE 636 W/ 250 OVERRIDE (IP): Mod: JZ | Performed by: STUDENT IN AN ORGANIZED HEALTH CARE EDUCATION/TRAINING PROGRAM

## 2024-11-03 PROCEDURE — A9270 NON-COVERED ITEM OR SERVICE: HCPCS | Performed by: STUDENT IN AN ORGANIZED HEALTH CARE EDUCATION/TRAINING PROGRAM

## 2024-11-03 PROCEDURE — 87077 CULTURE AEROBIC IDENTIFY: CPT

## 2024-11-03 PROCEDURE — 85027 COMPLETE CBC AUTOMATED: CPT

## 2024-11-03 PROCEDURE — 83735 ASSAY OF MAGNESIUM: CPT

## 2024-11-03 PROCEDURE — 80061 LIPID PANEL: CPT

## 2024-11-03 PROCEDURE — 36415 COLL VENOUS BLD VENIPUNCTURE: CPT

## 2024-11-03 PROCEDURE — 87205 SMEAR GRAM STAIN: CPT

## 2024-11-03 PROCEDURE — 87147 CULTURE TYPE IMMUNOLOGIC: CPT

## 2024-11-03 PROCEDURE — 700101 HCHG RX REV CODE 250: Performed by: OTOLARYNGOLOGY

## 2024-11-03 PROCEDURE — A9270 NON-COVERED ITEM OR SERVICE: HCPCS | Performed by: OTOLARYNGOLOGY

## 2024-11-03 PROCEDURE — 87070 CULTURE OTHR SPECIMN AEROBIC: CPT

## 2024-11-03 PROCEDURE — 700102 HCHG RX REV CODE 250 W/ 637 OVERRIDE(OP): Performed by: OTOLARYNGOLOGY

## 2024-11-03 PROCEDURE — 99233 SBSQ HOSP IP/OBS HIGH 50: CPT | Performed by: STUDENT IN AN ORGANIZED HEALTH CARE EDUCATION/TRAINING PROGRAM

## 2024-11-03 PROCEDURE — 82962 GLUCOSE BLOOD TEST: CPT

## 2024-11-03 PROCEDURE — 770006 HCHG ROOM/CARE - MED/SURG/GYN SEMI*

## 2024-11-03 PROCEDURE — 87186 SC STD MICRODIL/AGAR DIL: CPT

## 2024-11-03 PROCEDURE — 84100 ASSAY OF PHOSPHORUS: CPT

## 2024-11-03 RX ORDER — SENNOSIDES A AND B 8.6 MG/1
8.6 TABLET, FILM COATED ORAL 2 TIMES DAILY
Status: DISCONTINUED | OUTPATIENT
Start: 2024-11-03 | End: 2024-11-04 | Stop reason: HOSPADM

## 2024-11-03 RX ORDER — DEXTROSE MONOHYDRATE 25 G/50ML
25 INJECTION, SOLUTION INTRAVENOUS
Status: DISCONTINUED | OUTPATIENT
Start: 2024-11-03 | End: 2024-11-04

## 2024-11-03 RX ORDER — LIDOCAINE HYDROCHLORIDE AND EPINEPHRINE 10; 10 MG/ML; UG/ML
10 INJECTION, SOLUTION INFILTRATION; PERINEURAL ONCE
Status: COMPLETED | OUTPATIENT
Start: 2024-11-03 | End: 2024-11-03

## 2024-11-03 RX ORDER — POLYETHYLENE GLYCOL 3350 17 G/17G
1 POWDER, FOR SOLUTION ORAL
Status: DISCONTINUED | OUTPATIENT
Start: 2024-11-03 | End: 2024-11-04 | Stop reason: HOSPADM

## 2024-11-03 RX ORDER — INSULIN LISPRO 100 [IU]/ML
2-9 INJECTION, SOLUTION INTRAVENOUS; SUBCUTANEOUS
Status: DISCONTINUED | OUTPATIENT
Start: 2024-11-04 | End: 2024-11-04

## 2024-11-03 RX ORDER — ATORVASTATIN CALCIUM 40 MG/1
40 TABLET, FILM COATED ORAL EVERY EVENING
Status: DISCONTINUED | OUTPATIENT
Start: 2024-11-03 | End: 2024-11-04 | Stop reason: HOSPADM

## 2024-11-03 RX ADMIN — KETOROLAC TROMETHAMINE 15 MG: 15 INJECTION, SOLUTION INTRAMUSCULAR; INTRAVENOUS at 17:43

## 2024-11-03 RX ADMIN — KETOROLAC TROMETHAMINE 15 MG: 15 INJECTION, SOLUTION INTRAMUSCULAR; INTRAVENOUS at 08:08

## 2024-11-03 RX ADMIN — AMPICILLIN AND SULBACTAM 3 G: 1; 2 INJECTION, POWDER, FOR SOLUTION INTRAMUSCULAR; INTRAVENOUS at 12:11

## 2024-11-03 RX ADMIN — AMPICILLIN AND SULBACTAM 3 G: 1; 2 INJECTION, POWDER, FOR SOLUTION INTRAMUSCULAR; INTRAVENOUS at 05:39

## 2024-11-03 RX ADMIN — SODIUM CHLORIDE: 9 INJECTION, SOLUTION INTRAVENOUS at 05:37

## 2024-11-03 RX ADMIN — INSULIN LISPRO 3 UNITS: 100 INJECTION, SOLUTION INTRAVENOUS; SUBCUTANEOUS at 12:11

## 2024-11-03 RX ADMIN — AMPICILLIN AND SULBACTAM 3 G: 1; 2 INJECTION, POWDER, FOR SOLUTION INTRAMUSCULAR; INTRAVENOUS at 23:31

## 2024-11-03 RX ADMIN — AMPICILLIN AND SULBACTAM 3 G: 1; 2 INJECTION, POWDER, FOR SOLUTION INTRAMUSCULAR; INTRAVENOUS at 17:24

## 2024-11-03 RX ADMIN — BENZOCAINE, BUTAMBEN, AND TETRACAINE HYDROCHLORIDE 1 SPRAY: .028; .004; .004 AEROSOL, SPRAY TOPICAL at 13:33

## 2024-11-03 RX ADMIN — INSULIN LISPRO 2 UNITS: 100 INJECTION, SOLUTION INTRAVENOUS; SUBCUTANEOUS at 05:42

## 2024-11-03 RX ADMIN — LIDOCAINE HYDROCHLORIDE,EPINEPHRINE BITARTRATE 10 ML: 10; .01 INJECTION, SOLUTION INFILTRATION; PERINEURAL at 13:33

## 2024-11-03 RX ADMIN — ATORVASTATIN CALCIUM 40 MG: 40 TABLET, FILM COATED ORAL at 17:25

## 2024-11-03 RX ADMIN — ACETAMINOPHEN 650 MG: 325 TABLET ORAL at 12:13

## 2024-11-03 RX ADMIN — INSULIN LISPRO 5 UNITS: 100 INJECTION, SOLUTION INTRAVENOUS; SUBCUTANEOUS at 17:30

## 2024-11-03 ASSESSMENT — PAIN DESCRIPTION - PAIN TYPE
TYPE: ACUTE PAIN

## 2024-11-03 NOTE — PROGRESS NOTES
Hospital Medicine Daily Progress Note    Date of Service  11/3/2024    Chief Complaint  Rangel Malik is a 53 y.o. male admitted 11/1/2024 with peritonsillar abscess    Hospital Course  Rangel Malik is a 53 y.o. male with history of prediabetes who presented 11/1/2024 with a peritonsillar abscess.     In ER, patient found to be tachycardic.  Found to have leukocytosis and new onset diabetes.  CT neck showed right-sided peritonsillar 2.9 x 1.8 cm abscess.  I&D performed in ER.  ENT consulted    Strep A negative    Interval Problem Update  I saw and examined the patient at bedside  Care plan was discussed with the wife at bedside    Patient reported worsening throat swelling, pain, feels more muffled voice.   I discussed with ENT, plan to repeat I&D today    Culture grows MSSA  Continue Unasyn    BG still high, I increased Lantus to 21 units  Continue SSI        I discussed with the ENT, continue IV antibiotics, no repeated I&D needed at this point.  Patient is okay to eat    Co2 13, ag 23  Bg 280  Wbc 15>10  Lantus 15, ssi  K 4.5  ENT  Keton 5.21, LA 1      I have discussed this patient's plan of care and discharge plan at IDT rounds today with Case Management, Nursing, Nursing leadership, and other members of the IDT team.    Consultants/Specialty  ENT    Code Status  Full Code    Disposition  The patient is not medically cleared for discharge to home or a post-acute facility.      I have placed the appropriate orders for post-discharge needs.    Review of Systems  All 12 systems were reviewed and negative except as mentioned above      Physical Exam  Temp:  [36.2 °C (97.2 °F)-36.8 °C (98.3 °F)] 36.8 °C (98.3 °F)  Pulse:  [71-89] 76  Resp:  [17-18] 18  BP: (115-124)/(70-82) 124/78  SpO2:  [93 %-96 %] 96 %    Physical Exam  Constitutional:       Appearance: He is obese. He is ill-appearing.   HENT:      Head: Normocephalic.      Nose: Nose normal.      Mouth/Throat:      Mouth: Mucous membranes are moist.       Comments: Right-sided peritonsillar abscess  Eyes:      Extraocular Movements: Extraocular movements intact.      Conjunctiva/sclera: Conjunctivae normal.   Cardiovascular:      Rate and Rhythm: Normal rate and regular rhythm.      Pulses: Normal pulses.      Heart sounds: Normal heart sounds.   Pulmonary:      Effort: Pulmonary effort is normal.      Breath sounds: Normal breath sounds.   Abdominal:      General: Bowel sounds are normal.      Palpations: Abdomen is soft.      Tenderness: There is no abdominal tenderness. There is no guarding.   Musculoskeletal:         General: No swelling or tenderness.      Cervical back: Normal range of motion.   Skin:     General: Skin is warm.   Neurological:      Mental Status: He is alert and oriented to person, place, and time. Mental status is at baseline.   Psychiatric:         Mood and Affect: Mood normal.         Fluids    Intake/Output Summary (Last 24 hours) at 11/3/2024 1328  Last data filed at 11/3/2024 0900  Gross per 24 hour   Intake 1000 ml   Output --   Net 1000 ml        Laboratory  Recent Labs     11/01/24  2056 11/02/24  0135 11/03/24  0012   WBC 15.7* 10.6 11.0*   RBC 6.29* 5.71 5.02   HEMOGLOBIN 18.5* 16.9 15.5   HEMATOCRIT 56.1* 51.1 45.4   MCV 89.2 89.5 90.4   MCH 29.4 29.6 30.9   MCHC 33.0 33.1 34.1   RDW 40.4 40.1 41.4   PLATELETCT 210 183 182   MPV 10.9 10.7 10.7     Recent Labs     11/03/24  0431 11/03/24  0821 11/03/24  1204   SODIUM 134* 137 136   POTASSIUM 3.8 3.9 3.8   CHLORIDE 102 104 103   CO2 19* 22 18*   GLUCOSE 226* 208* 250*   BUN 14 12 12   CREATININE 0.82 0.80 0.66   CALCIUM 8.7 8.9 8.4*             Recent Labs     11/03/24  0012   TRIGLYCERIDE 186*   HDL 42   *       Imaging  CT-SOFT TISSUE NECK WITH   Final Result      Right peritonsillar 2.9 x 1.8 cm abscess           Assessment/Plan  * Peritonsillar abscess  Assessment & Plan  Patient has right peritonsillar abscess 2.9 x 1.8 cm.   I and D performed at the ER, 3 cc pus  aspirated  Received 1 dose of IV Decadron   Started on Unasyn    Strep A PCR negative    11/3 reported worsening swelling and pain, plan to repeat I&D today  Culture grows MSSA  Continue Unasyn    DKA (diabetic ketoacidosis) (Formerly [facility])  Assessment & Plan  Co2 13, AG 23  Keton 5.21, LA 1  VBG pH 7.3, pCO2 36     11/3 anion gap closed, CO2 19, AG 15, potassium 3.8, sodium 130  Continue IV fluid, insulin  Improving, continue Lantus and SSI  Continue aggressive IV fluid    Class 1 obesity in adult  Assessment & Plan  BMI 32  Weight loss education     SIRS (systemic inflammatory response syndrome) ([facility])  Assessment & Plan  SIRS criteria identified on my evaluation include:  Tachycardia, with heart rate greater than 90 BPM and Leukocytosis, with WBC greater than 12,000      Uncontrolled diabetes mellitus with hyperglycemia ([facility])  Assessment & Plan  New diabetes  History of prediabetes 2 years ago, however, he lost to follow-up and is not on any diabetes medications    A1c 13.5  BG over 300    I started Lantus 15 units, SSI  11/3 BG still high, I increased Lantus to 21 units  Hypoglycemia protocol  I ordered a diabetes education         VTE prophylaxis: scd    I have performed a physical exam and reviewed and updated ROS and Plan today (11/3/2024). In review of yesterday's note (11/2/2024), there are no changes except as documented above.    I spent greater than 53 minutes for chart review, obtaining history independently, performing medically appropriate examination,  documenting , ordering medications, tests, or procedures, referring and communicating with other health care professionals, Independently interpreting results and communicating results with patient/family/caregiver. More than 50% of time was spent in face-to-face clinical encounter.     ROS  VTE Selection

## 2024-11-03 NOTE — PROGRESS NOTES
"S: Feeling more sore today, harder to swallow and voice more muffled. Some increased pressure into right ear, improved with pain medication    O:  /78   Pulse 76   Temp 36.8 °C (98.3 °F) (Temporal)   Resp 18   Ht 1.778 m (5' 10\")   Wt 103 kg (227 lb 1.2 oz)   SpO2 96%   Gen: interactive and appropriate  Pulm: Breathing comfortably on RA without stridor or stertor  Face: symmetric, no edema, facial strength intact bilaterally  OC/OP: right soft palate with increased edema and erythema, more asymmetric with increased fullness and uvular deviation vs yesterday. Dentition intact. Posterior pharynx easily visible. Floor of mouth soft and flat  Neck: flat, no discrete masses  Lymph: no palpable lymphadenopathy in neck or parotid  Neuro: cranial nerves grossly intact bilaterally. Mood appropriate    Procedure: I&D of PTA  Verbal consent obtained. OP anesthestized with hurricane spray followed by infiltration with 2% lidocaine w epi. Abscess pocket localized with 18 gauge needle, 11 blade used to make incision in soft palate, peritonsillar space dissected with hemostat with return of pus. Patient had relief of throat pain and odynophagia. Hemostasis was adequate. Patient tolerated the procedure well. There were no complications. EBL minimal.      Recent Labs     11/01/24  2056 11/02/24  0135 11/03/24  0012   WBC 15.7* 10.6 11.0*   RBC 6.29* 5.71 5.02   HEMOGLOBIN 18.5* 16.9 15.5   HEMATOCRIT 56.1* 51.1 45.4   MCV 89.2 89.5 90.4   MCH 29.4 29.6 30.9   MCHC 33.0 33.1 34.1   RDW 40.4 40.1 41.4   PLATELETCT 210 183 182   MPV 10.9 10.7 10.7     Recent Labs     11/03/24  0431 11/03/24  0821 11/03/24  1204   SODIUM 134* 137 136   POTASSIUM 3.8 3.9 3.8   CHLORIDE 102 104 103   CO2 19* 22 18*   GLUCOSE 226* 208* 250*   BUN 14 12 12   CREATININE 0.82 0.80 0.66   CALCIUM 8.7 8.9 8.4*             A/P:Rangel Malik is a 53 y.o. male male with recurrent right PTA and new DM diagnosis. S/p I&D in ER 11/1, but now abscess " has reformed again and repeat I&D performed at bedside. Tolerated well. Culture sent. Ok for oral diet. I will follow. Encourage aggressive glucose control.        Luz Matute M.D.

## 2024-11-04 ENCOUNTER — PHARMACY VISIT (OUTPATIENT)
Dept: PHARMACY | Facility: MEDICAL CENTER | Age: 54
End: 2024-11-04
Payer: COMMERCIAL

## 2024-11-04 VITALS
RESPIRATION RATE: 18 BRPM | SYSTOLIC BLOOD PRESSURE: 130 MMHG | DIASTOLIC BLOOD PRESSURE: 73 MMHG | TEMPERATURE: 97.2 F | WEIGHT: 227.07 LBS | OXYGEN SATURATION: 96 % | BODY MASS INDEX: 32.51 KG/M2 | HEART RATE: 70 BPM | HEIGHT: 70 IN

## 2024-11-04 LAB
ANION GAP SERPL CALC-SCNC: 13 MMOL/L (ref 7–16)
BUN SERPL-MCNC: 10 MG/DL (ref 8–22)
CALCIUM SERPL-MCNC: 8.7 MG/DL (ref 8.5–10.5)
CHLORIDE SERPL-SCNC: 103 MMOL/L (ref 96–112)
CO2 SERPL-SCNC: 23 MMOL/L (ref 20–33)
CREAT SERPL-MCNC: 0.69 MG/DL (ref 0.5–1.4)
ERYTHROCYTE [DISTWIDTH] IN BLOOD BY AUTOMATED COUNT: 40.2 FL (ref 35.9–50)
GFR SERPLBLD CREATININE-BSD FMLA CKD-EPI: 111 ML/MIN/1.73 M 2
GLUCOSE SERPL-MCNC: 216 MG/DL (ref 65–99)
HCT VFR BLD AUTO: 47.1 % (ref 42–52)
HGB BLD-MCNC: 16.2 G/DL (ref 14–18)
MCH RBC QN AUTO: 31 PG (ref 27–33)
MCHC RBC AUTO-ENTMCNC: 34.4 G/DL (ref 32.3–36.5)
MCV RBC AUTO: 90.1 FL (ref 81.4–97.8)
PLATELET # BLD AUTO: 168 K/UL (ref 164–446)
PMV BLD AUTO: 10.9 FL (ref 9–12.9)
POTASSIUM SERPL-SCNC: 3.5 MMOL/L (ref 3.6–5.5)
RBC # BLD AUTO: 5.23 M/UL (ref 4.7–6.1)
SODIUM SERPL-SCNC: 139 MMOL/L (ref 135–145)
WBC # BLD AUTO: 4.7 K/UL (ref 4.8–10.8)

## 2024-11-04 PROCEDURE — 700111 HCHG RX REV CODE 636 W/ 250 OVERRIDE (IP): Mod: JZ | Performed by: STUDENT IN AN ORGANIZED HEALTH CARE EDUCATION/TRAINING PROGRAM

## 2024-11-04 PROCEDURE — RXMED WILLOW AMBULATORY MEDICATION CHARGE: Performed by: STUDENT IN AN ORGANIZED HEALTH CARE EDUCATION/TRAINING PROGRAM

## 2024-11-04 PROCEDURE — 99239 HOSP IP/OBS DSCHRG MGMT >30: CPT | Performed by: STUDENT IN AN ORGANIZED HEALTH CARE EDUCATION/TRAINING PROGRAM

## 2024-11-04 PROCEDURE — RXOTC WILLOW AMBULATORY OTC CHARGE: Performed by: PHARMACIST

## 2024-11-04 PROCEDURE — 700105 HCHG RX REV CODE 258: Performed by: STUDENT IN AN ORGANIZED HEALTH CARE EDUCATION/TRAINING PROGRAM

## 2024-11-04 PROCEDURE — 85027 COMPLETE CBC AUTOMATED: CPT

## 2024-11-04 PROCEDURE — 80048 BASIC METABOLIC PNL TOTAL CA: CPT

## 2024-11-04 PROCEDURE — 36415 COLL VENOUS BLD VENIPUNCTURE: CPT

## 2024-11-04 RX ORDER — LANCETS 30 GAUGE
EACH MISCELLANEOUS
Qty: 100 EACH | Refills: 0 | Status: SHIPPED | OUTPATIENT
Start: 2024-11-04

## 2024-11-04 RX ORDER — ACETAMINOPHEN 325 MG/1
650 TABLET ORAL EVERY 6 HOURS PRN
Status: SHIPPED
Start: 2024-11-04

## 2024-11-04 RX ORDER — FAMOTIDINE 20 MG/1
20 TABLET, FILM COATED ORAL DAILY
Qty: 7 TABLET | Refills: 0 | Status: SHIPPED | OUTPATIENT
Start: 2024-11-04 | End: 2024-11-11

## 2024-11-04 RX ORDER — ATORVASTATIN CALCIUM 40 MG/1
40 TABLET, FILM COATED ORAL EVERY EVENING
Qty: 30 TABLET | Refills: 0 | Status: SHIPPED | OUTPATIENT
Start: 2024-11-04 | End: 2024-11-06 | Stop reason: SDUPTHER

## 2024-11-04 RX ORDER — DEXTROSE MONOHYDRATE 25 G/50ML
25 INJECTION, SOLUTION INTRAVENOUS
Status: DISCONTINUED | OUTPATIENT
Start: 2024-11-04 | End: 2024-11-04 | Stop reason: HOSPADM

## 2024-11-04 RX ORDER — INSULIN GLARGINE-YFGN 100 [IU]/ML
21 INJECTION, SOLUTION SUBCUTANEOUS EVERY EVENING
Qty: 9 ML | Refills: 0 | Status: ACTIVE | OUTPATIENT
Start: 2024-11-04 | End: 2024-12-04

## 2024-11-04 RX ORDER — GLUCOSAMINE HCL/CHONDROITIN SU 500-400 MG
CAPSULE ORAL
Qty: 100 EACH | Refills: 0 | Status: SHIPPED | OUTPATIENT
Start: 2024-11-04

## 2024-11-04 RX ORDER — IBUPROFEN 600 MG/1
600 TABLET, FILM COATED ORAL EVERY 8 HOURS PRN
Qty: 20 TABLET | Refills: 0 | Status: SHIPPED | OUTPATIENT
Start: 2024-11-04 | End: 2024-11-11

## 2024-11-04 RX ORDER — INSULIN LISPRO 100 [IU]/ML
2-12 INJECTION, SOLUTION INTRAVENOUS; SUBCUTANEOUS
Qty: 12 ML | Refills: 0 | Status: ACTIVE | OUTPATIENT
Start: 2024-11-04 | End: 2024-12-08

## 2024-11-04 RX ORDER — INSULIN LISPRO 100 [IU]/ML
2-12 INJECTION, SOLUTION INTRAVENOUS; SUBCUTANEOUS
Status: DISCONTINUED | OUTPATIENT
Start: 2024-11-04 | End: 2024-11-04 | Stop reason: HOSPADM

## 2024-11-04 RX ORDER — DIPHENHYDRAMINE HYDROCHLORIDE 25 MG/1
CAPSULE, LIQUID FILLED ORAL
Qty: 1 KIT | Refills: 0 | Status: SHIPPED | OUTPATIENT
Start: 2024-11-04

## 2024-11-04 RX ADMIN — AMPICILLIN AND SULBACTAM 3 G: 1; 2 INJECTION, POWDER, FOR SOLUTION INTRAMUSCULAR; INTRAVENOUS at 05:15

## 2024-11-04 ASSESSMENT — PAIN DESCRIPTION - PAIN TYPE: TYPE: ACUTE PAIN

## 2024-11-04 NOTE — DISCHARGE INSTRUCTIONS
- Follow up with primary care physician in 1 week for diabetes management    - Follow up with ENT doctor as instructed, may need tonsillectomy    - Please take the medications as instructed    - Go to the local Emergency Department if you have any worsening condition.

## 2024-11-04 NOTE — PROGRESS NOTES
"S: much improved. Was able to eat soft diet last night. No further pain    O:  /73   Pulse 70   Temp 36.2 °C (97.2 °F) (Temporal)   Resp 18   Ht 1.778 m (5' 10\")   Wt 103 kg (227 lb 1.2 oz)   SpO2 96%   Gen: interactive and appropriate  Pulm: Breathing comfortably on RA without stridor or stertor  Face: symmetric, no edema, facial strength intact bilaterally  OC/OP: right soft palate much improved. Small residual erythema and asymmetry. No further fluctuance  Neck: flat, no discrete masses  Neuro: cranial nerves grossly intact bilaterally. Mood appropriate        Recent Labs     11/02/24  0135 11/03/24  0012 11/04/24  0531   WBC 10.6 11.0* 4.7*   RBC 5.71 5.02 5.23   HEMOGLOBIN 16.9 15.5 16.2   HEMATOCRIT 51.1 45.4 47.1   MCV 89.5 90.4 90.1   MCH 29.6 30.9 31.0   MCHC 33.1 34.1 34.4   RDW 40.1 41.4 40.2   PLATELETCT 183 182 168   MPV 10.7 10.7 10.9     Recent Labs     11/03/24  0821 11/03/24  1204 11/04/24  0531   SODIUM 137 136 139   POTASSIUM 3.9 3.8 3.5*   CHLORIDE 104 103 103   CO2 22 18* 23   GLUCOSE 208* 250* 216*   BUN 12 12 10   CREATININE 0.80 0.66 0.69   CALCIUM 8.9 8.4* 8.7             A/P:Rangel Malik is a 53 y.o. male male with recurrent right PTA and new DM diagnosis. S/p I&D in ER 11/1 and at bedside 11/3 by me. Much improved this am. Tolerated soft diet well. Given recurrence of PTA, would recommend follow up with me in about a month to ensure he heals and discuss possible tonsillectomy. Follow culture, complete course of antibiotics. I will follow peripherally. Encourage aggressive glucose control, appreciate hospitalist management.      Luz Matute M.D.  [phone]  "

## 2024-11-04 NOTE — DISCHARGE SUMMARY
Discharge Summary    CHIEF COMPLAINT ON ADMISSION  Chief Complaint   Patient presents with    Abscess     Pt sent from  for abscess in the back of his throat.        Reason for Admission  Sent by      Admission Date  11/1/2024    CODE STATUS  Full Code    HPI & HOSPITAL COURSE  Rangel Malik is a 53 y.o. male with history of prediabetes who presented 11/1/2024 with a peritonsillar abscess. CT neck showed right-sided peritonsillar 2.9 x 1.8 cm abscess.  I&D performed in ER.  ENT consulted.  Patient has been started on IV Unasyn.  The abscess culture positive for MSSA.  He received repeated I&D by ENT Dr. Matute on 11/3.  Her symptoms improved.  He is cleared for discharge by ENT and will follow-up with the ENT doctor in a month to ensure the heal and possible tonsillectomy.  He will continue the Augmentin for 10 more days.        Patient was also found new onset diabetes and DKA at admission.  He reported History of prediabetes for 2 years, however, he lost to follow-up and was not on any diabetes medications. A1c 13.5. BG over 300.  Patient has been started on Lantus and short-acting insulin sliding scale.  He also received aggressive IV fluid.  His BG improved, anion gap closed.  Diabetes education was given at the bedside.  He will continue insulin and follow-up with PCP.     Therefore, he is discharged in good and stable condition to home with close outpatient follow-up.    The patient met 2-midnight criteria for an inpatient stay at the time of discharge.    Discharge Date  11/4/2023    FOLLOW UP ITEMS POST DISCHARGE  - Follow up with primary care physician in 1 week for diabetes management    - Follow up with ENT doctor as instructed, may need tonsillectomy    - Please take the medications as instructed    - Go to the local Emergency Department if you have any worsening condition.     DISCHARGE DIAGNOSES  Principal Problem:    Peritonsillar abscess (POA: Unknown)  Active Problems:    Uncontrolled  diabetes mellitus with hyperglycemia (HCC) (POA: Unknown)    SIRS (systemic inflammatory response syndrome) (HCC) (POA: Unknown)    Class 1 obesity in adult (POA: Unknown)    DKA (diabetic ketoacidosis) (HCC) (POA: Unknown)    Hyperlipidemia (POA: Unknown)  Resolved Problems:    * No resolved hospital problems. *      FOLLOW UP  No future appointments.  Luz Matute M.D.  [address] [place] [id number]  [phone]    Follow up in 1 month(s)  follow up recommended by specialist      MEDICATIONS ON DISCHARGE     Medication List        START taking these medications        Instructions   Alcohol Swabs   Doctor's comments: Per formulary preference. ICD-10 code: E11.65 Uncontrolled type 2 Diabetes Mellitus  Wipe site with prep pad prior to injection.     amoxicillin-clavulanate 875-125 MG Tabs  Commonly known as: Augmentin   Take 1 Tablet by mouth 2 times a day for 10 days.  Dose: 1 Tablet     atorvastatin 40 MG Tabs  Commonly known as: Lipitor   Take 1 Tablet by mouth every evening for 30 days.  Dose: 40 mg     benzocaine-butamben-tetracaine 2-2-14 % Aero  Commonly known as: Cetacaine   Apply 1 Spray topically as needed (pain) for up to 7 days.  Dose: 1 Spray     Blood Glucose Monitor System w/Device Kit   Doctor's comments: Or per formulary preference. ICD-10 code: E11.65 Uncontrolled type 2 Diabetes Mellitus  Test blood sugar as recommended by provider.     famotidine 20 MG Tabs  Commonly known as: Pepcid   Take 1 Tablet by mouth every day for 7 days.  Dose: 20 mg     glucose blood strip   Doctor's comments: Or per formulary preference. ICD-10 code: E11.65 Uncontrolled type 2 Diabetes Mellitus  Use one  strip to test blood sugar three times daily before meals.     Insulin Glargine-yfgn 100 UNIT/ML Sopn  Commonly known as: Semglee (yfgn)   Inject 21 Units under the skin every evening for 30 days.  Dose: 21 Units     insulin lispro 100 UNIT/ML Sopn injection PEN  Commonly known as: HumaLOG/AdmeLOG   Doctor's  comments: 151 - 200 mg/dL = 2 Units 201 - 250 mg/dL = 4 Units 251 - 300 mg/dL = 6 Units 301 - 350 mg/dL = 8 Units 351 - 400 mg/dL = 10 Units Over 400 mg/dL = 12 Units  Inject 2-12 Units under the skin 3 times a day before meals per sliding scale. 151 - 200 mg/dL = 2 Units 201 - 250 mg/dL = 4 Units 251 - 300 mg/dL = 6 Units 301 - 350 mg/dL = 8 Units 351 - 400 mg/dL = 10 Units Over 400 mg/dL = 12 Units  Dose: 2-12 Units     Insulin Pen Needle 32 G x 4 mm   Doctor's comments: Per formulary preference. ICD-10 code: E11.65 Uncontrolled type 2 Diabetes Mellitus  Use to inject insulin three times daily.     Lancets   Doctor's comments: Or per formulary preference. ICD-10 code: E11.65 Uncontrolled type 2 Diabetes Mellitus  Use one lancet to test blood sugar three times daily before meals.            CHANGE how you take these medications        Instructions   acetaminophen 325 MG Tabs  What changed:   medication strength  how much to take  when to take this  Commonly known as: Tylenol   Take 2 Tablets by mouth every 6 hours as needed for Moderate Pain.  Dose: 650 mg     ibuprofen 600 MG Tabs  What changed:   medication strength  how much to take  reasons to take this  Commonly known as: Motrin   Take 1 Tablet by mouth every 8 hours as needed for Mild Pain or Moderate Pain for up to 7 days.  Dose: 600 mg              Allergies  No Known Allergies    DIET  Orders Placed This Encounter   Procedures    Diet Order Diet: Level 6 - Soft and Bite Sized; Liquid level: Level 0 - Thin     Standing Status:   Standing     Number of Occurrences:   1     Order Specific Question:   Diet:     Answer:   Level 6 - Soft and Bite Sized [23]     Order Specific Question:   Liquid level     Answer:   Level 0 - Thin       ACTIVITY  As tolerated.  Weight bearing as tolerated    CONSULTATIONS  ENT    PROCEDURES      LABORATORY  Lab Results   Component Value Date    SODIUM 139 11/04/2024    POTASSIUM 3.5 (L) 11/04/2024    CHLORIDE 103 11/04/2024    CO2  23 11/04/2024    GLUCOSE 216 (H) 11/04/2024    BUN 10 11/04/2024    CREATININE 0.69 11/04/2024        Lab Results   Component Value Date    WBC 4.7 (L) 11/04/2024    HEMOGLOBIN 16.2 11/04/2024    HEMATOCRIT 47.1 11/04/2024    PLATELETCT 168 11/04/2024        Total time of the discharge process exceeds 35 minutes.

## 2024-11-04 NOTE — CARE PLAN
Problem: Pain - Standard  Goal: Alleviation of pain or a reduction in pain to the patient’s comfort goal  Outcome: Progressing     Problem: Knowledge Deficit - Standard  Goal: Patient and family/care givers will demonstrate understanding of plan of care, disease process/condition, diagnostic tests and medications  Outcome: Progressing   The patient is Stable - Low risk of patient condition declining or worsening    Shift Goals  Clinical Goals: iv abx, monitor airway, pain control  Patient Goals: pain control  Family Goals: alvarez    Progress made toward(s) clinical / shift goals:  patient a+o x 4, +throat pain this am - iv toradol admin with good effect, ENT MD to come in today for a possible I+D of his peritonsillar abscess, diabetic education provided and patient gave 1st insulin injection at lunch, iv abx a/o, vss, afebrile, able to clear secretions, no needs at this time, poc ongoing    Patient is not progressing towards the following goals: n/a    Fall precautions/hourly rounding maintained, call light within reach and functioning, all items within reach.  Patient encouraged to call for assistance, poc reviewed with patient, ?'s/concerns answered.        
Pt AO x 4.  Pt denied pain during initial assessment, stated hopefulness to discharge today.  Call light and belongings within reach.  Bed locked and in lowest position.  Hourly rounding.  Needs currently met.           The patient is Stable - Low risk of patient condition declining or worsening    Shift Goals  Clinical Goals: tolerate diet without complications;discharge  Patient Goals: go home today  Family Goals: ERROL    Progress made toward(s) clinical / shift goals:    Pt was able to tolerate diet with minimal pain during swallowing.  Pt discharged successfully prior to end of shift.       Problem: Knowledge Deficit - Standard  Goal: Patient and family/care givers will demonstrate understanding of plan of care, disease process/condition, diagnostic tests and medications  Outcome: Progressing     Problem: Pain - Standard  Goal: Alleviation of pain or a reduction in pain to the patient’s comfort goal  Outcome: Progressing         
The patient is Stable - Low risk of patient condition declining or worsening    Shift Goals  Clinical Goals: IV abx, monitor blood glucose, Safety  Patient Goals: Go home  Family Goals: alvarez    Progress made toward(s) clinical / shift goals:  Patient reports throat pain, prn medication given with good relief. Patient IV patent, IVF and IV abx infusing, tolerating well. Bed locked to lowest position, threaded slipper socks on and call light in reach. All need met at this time.     Patient is not progressing towards the following goals: N/A      
The patient is Stable - Low risk of patient condition declining or worsening    Shift Goals  Clinical Goals: NPO, Pain control, IV abx  Patient Goals: rest and comfort  Family Goals: alvarez    Progress made toward(s) clinical / shift goals:  Patient denies pain at this time. Patient IV patent and IV abx infusing, tolerating well. All needs attended at this time. Threaded slipper socks on, bed locked to lowest position and call light in reach.     Patient is not progressing towards the following goals: N/A      
The patient is Stable - Low risk of patient condition declining or worsening    Shift Goals  Clinical Goals: monitor glucose, free from injury or fall, IV abx  Patient Goals: rest, comfort  Family Goals: alvarez    Progress made toward(s) clinical / shift goals:      Problem: Knowledge Deficit - Standard  Goal: Patient and family/care givers will demonstrate understanding of plan of care, disease process/condition, diagnostic tests and medications  Description: Target End Date:  1-3 days or as soon as patient condition allows    Document in Patient Education    1.  Patient and family/caregiver oriented to unit, equipment, visitation policy and means for communicating concern  2.  Complete/review Learning Assessment  3.  Assess knowledge level of disease process/condition, treatment plan, diagnostic tests and medications  4.  Explain disease process/condition, treatment plan, diagnostic tests and medications  11/2/2024 1537 by Ruddy Arriaga, R.N.  Outcome: Progressing  11/2/2024 1536 by Ruddy Arriaga R.NShashi  Outcome: Progressing     Problem: GLYCEMIA IMBALANCE  Goal: Patient's continuum of care needs are met  Outcome: Progressing       Patient is not progressing towards the following goals:      
The patient is Watcher - Medium risk of patient condition declining or worsening    Shift Goals  Clinical Goals: rest, pain control, ABX  Patient Goals: rest, pain control  Family Goals: alvarez    Progress made toward(s) clinical / shift goals:  Patient was able to rest intermittently overnight. Patient reported pain to be managed with PRN and scheduled medications. Educated on pharmacological and non pharmacological modalities. Patient tolerated IV ABX.     Patient is not progressing towards the following goals:      
PERRL/EOMI/conjunctiva clear/normal

## 2024-11-04 NOTE — PROGRESS NOTES
"Assumed care of patient at 1845. Bedside report received. Assessment complete.  AA&Ox4. Denies CP/SOB.  Reporting 0/10 pain. No intervention needed at this time.   Educated patient regarding pharmacologic and non pharmacologic modalities for pain management.  Skin per flowsheet.  Tolerating level 6 soft and bite sized diet. Denies N/V at this time.   + void. + BM. Last BM 11/3  Pt ambulates independently and frequently.   All needs met at this time. Call light within reach. Pt calls appropriately. Bed low and locked, non skid socks in place. Hourly rounding in place.    /66   Pulse 78   Temp 36.2 °C (97.1 °F) (Temporal)   Resp 18   Ht 1.778 m (5' 10\")   Wt 103 kg (227 lb 1.2 oz)   SpO2 94%   BMI 32.58 kg/m²     "

## 2024-11-05 NOTE — CONSULTS
Diabetes education: Pt is newly dx with diabetes, admitted with blood sugar of 243, and hga1c of 13.5% . Pt was on Glargine 21 units pm with Lispro per sliding scale coverage ac and hs.  Pt was seen today before discharge.  Discussed what diabetes was, type two, hypoglycemia,  hyperglycemia, and goals for blood sugars.  Discussed need for carbohydrates and proteins, with every meal and why.  . Discussed need, benefit and precautions with exercise.  Discussed  what effects blood sugars. Discussed need to follow up with PCP ( pt has a new PCP appointment scheduled).  Insulin was discussed as well, insulin storage, shelf life and site rotation. Family  practiced with saline pens, and practice device.  Pt was taught to use One touch verio flex meter and finger sticks reviewed. Pt has his insulin, pen needles, meter, strips and lancets at discharge. [facility] DM book and pen handout given. Questions answered.

## 2024-11-06 ENCOUNTER — TELEPHONE (OUTPATIENT)
Dept: HEALTH INFORMATION MANAGEMENT | Facility: OTHER | Age: 54
End: 2024-11-06

## 2024-11-06 ENCOUNTER — OFFICE VISIT (OUTPATIENT)
Dept: MEDICAL GROUP | Facility: PHYSICIAN GROUP | Age: 54
End: 2024-11-06
Attending: STUDENT IN AN ORGANIZED HEALTH CARE EDUCATION/TRAINING PROGRAM
Payer: COMMERCIAL

## 2024-11-06 VITALS
SYSTOLIC BLOOD PRESSURE: 102 MMHG | HEIGHT: 70 IN | TEMPERATURE: 96.9 F | OXYGEN SATURATION: 96 % | WEIGHT: 228.3 LBS | DIASTOLIC BLOOD PRESSURE: 64 MMHG | HEART RATE: 80 BPM | RESPIRATION RATE: 16 BRPM | BODY MASS INDEX: 32.69 KG/M2

## 2024-11-06 DIAGNOSIS — M1A.09X0 IDIOPATHIC CHRONIC GOUT OF MULTIPLE SITES WITHOUT TOPHUS: ICD-10-CM

## 2024-11-06 DIAGNOSIS — G89.29 CHRONIC PAIN OF BOTH KNEES: ICD-10-CM

## 2024-11-06 DIAGNOSIS — M25.561 CHRONIC PAIN OF BOTH KNEES: ICD-10-CM

## 2024-11-06 DIAGNOSIS — E11.69 DYSLIPIDEMIA ASSOCIATED WITH TYPE 2 DIABETES MELLITUS (HCC): ICD-10-CM

## 2024-11-06 DIAGNOSIS — J36 PERITONSILLAR ABSCESS: ICD-10-CM

## 2024-11-06 DIAGNOSIS — Z12.5 ENCOUNTER FOR SCREENING FOR MALIGNANT NEOPLASM OF PROSTATE: ICD-10-CM

## 2024-11-06 DIAGNOSIS — E55.9 VITAMIN D DEFICIENCY: ICD-10-CM

## 2024-11-06 DIAGNOSIS — Z23 NEED FOR VACCINATION: ICD-10-CM

## 2024-11-06 DIAGNOSIS — M25.512 CHRONIC LEFT SHOULDER PAIN: ICD-10-CM

## 2024-11-06 DIAGNOSIS — G89.29 CHRONIC LEFT SHOULDER PAIN: ICD-10-CM

## 2024-11-06 DIAGNOSIS — E78.5 DYSLIPIDEMIA ASSOCIATED WITH TYPE 2 DIABETES MELLITUS (HCC): ICD-10-CM

## 2024-11-06 DIAGNOSIS — Z11.3 SCREENING EXAMINATION FOR SEXUALLY TRANSMITTED DISEASE: ICD-10-CM

## 2024-11-06 DIAGNOSIS — M25.562 CHRONIC PAIN OF BOTH KNEES: ICD-10-CM

## 2024-11-06 DIAGNOSIS — Z11.59 NEED FOR HEPATITIS C SCREENING TEST: ICD-10-CM

## 2024-11-06 DIAGNOSIS — Z12.11 COLON CANCER SCREENING: ICD-10-CM

## 2024-11-06 DIAGNOSIS — R53.83 FATIGUE, UNSPECIFIED TYPE: ICD-10-CM

## 2024-11-06 DIAGNOSIS — E11.65 UNCONTROLLED TYPE 2 DIABETES MELLITUS WITH HYPERGLYCEMIA (HCC): ICD-10-CM

## 2024-11-06 PROBLEM — R73.03 PREDIABETES: Status: RESOLVED | Noted: 2020-12-24 | Resolved: 2024-11-06

## 2024-11-06 PROBLEM — E66.9 OBESITY (BMI 35.0-39.9 WITHOUT COMORBIDITY): Status: RESOLVED | Noted: 2018-09-05 | Resolved: 2024-11-06

## 2024-11-06 PROBLEM — M79.89 LEG SWELLING: Status: RESOLVED | Noted: 2018-10-31 | Resolved: 2024-11-06

## 2024-11-06 PROBLEM — E66.811 CLASS 1 OBESITY IN ADULT: Status: RESOLVED | Noted: 2024-11-02 | Resolved: 2024-11-06

## 2024-11-06 PROBLEM — N47.5 ADHESIONS OF PREPUCE: Status: RESOLVED | Noted: 2020-11-17 | Resolved: 2024-11-06

## 2024-11-06 PROBLEM — E11.10 DKA (DIABETIC KETOACIDOSIS) (HCC): Status: RESOLVED | Noted: 2024-11-02 | Resolved: 2024-11-06

## 2024-11-06 PROBLEM — R65.10 SIRS (SYSTEMIC INFLAMMATORY RESPONSE SYNDROME) (HCC): Status: RESOLVED | Noted: 2024-11-01 | Resolved: 2024-11-06

## 2024-11-06 LAB
BACTERIA WND AEROBE CULT: ABNORMAL
BACTERIA WND AEROBE CULT: ABNORMAL
GRAM STN SPEC: ABNORMAL
SIGNIFICANT IND 70042: ABNORMAL
SITE SITE: ABNORMAL
SOURCE SOURCE: ABNORMAL

## 2024-11-06 PROCEDURE — 90471 IMMUNIZATION ADMIN: CPT

## 2024-11-06 PROCEDURE — 99215 OFFICE O/P EST HI 40 MIN: CPT | Mod: 25 | Performed by: PHYSICIAN ASSISTANT

## 2024-11-06 PROCEDURE — 90656 IIV3 VACC NO PRSV 0.5 ML IM: CPT

## 2024-11-06 PROCEDURE — 3074F SYST BP LT 130 MM HG: CPT | Performed by: PHYSICIAN ASSISTANT

## 2024-11-06 PROCEDURE — 90472 IMMUNIZATION ADMIN EACH ADD: CPT

## 2024-11-06 PROCEDURE — 3078F DIAST BP <80 MM HG: CPT | Performed by: PHYSICIAN ASSISTANT

## 2024-11-06 PROCEDURE — 92250 FUNDUS PHOTOGRAPHY W/I&R: CPT | Mod: 26 | Performed by: PHYSICIAN ASSISTANT

## 2024-11-06 PROCEDURE — 90677 PCV20 VACCINE IM: CPT

## 2024-11-06 RX ORDER — PROCHLORPERAZINE 25 MG/1
SUPPOSITORY RECTAL
Qty: 6 EACH | Refills: 0 | Status: SHIPPED | OUTPATIENT
Start: 2024-11-06 | End: 2024-11-06

## 2024-11-06 RX ORDER — METFORMIN HYDROCHLORIDE 500 MG/1
1000 TABLET, EXTENDED RELEASE ORAL 2 TIMES DAILY
Qty: 360 TABLET | Refills: 0 | Status: SHIPPED | OUTPATIENT
Start: 2024-11-06

## 2024-11-06 RX ORDER — PROCHLORPERAZINE 25 MG/1
SUPPOSITORY RECTAL
Qty: 6 EACH | Refills: 0 | Status: SHIPPED | OUTPATIENT
Start: 2024-11-06

## 2024-11-06 RX ORDER — PROCHLORPERAZINE 25 MG/1
SUPPOSITORY RECTAL
Qty: 1 EACH | Refills: 0 | Status: SHIPPED | OUTPATIENT
Start: 2024-11-06

## 2024-11-06 RX ORDER — LISINOPRIL 2.5 MG/1
2.5 TABLET ORAL DAILY
Qty: 90 TABLET | Refills: 0 | Status: SHIPPED | OUTPATIENT
Start: 2024-11-06

## 2024-11-06 RX ORDER — ATORVASTATIN CALCIUM 40 MG/1
40 TABLET, FILM COATED ORAL EVERY EVENING
Qty: 90 TABLET | Refills: 0 | Status: SHIPPED | OUTPATIENT
Start: 2024-11-06

## 2024-11-06 RX ORDER — PROCHLORPERAZINE 25 MG/1
SUPPOSITORY RECTAL
Qty: 1 EACH | Refills: 0 | Status: SHIPPED | OUTPATIENT
Start: 2024-11-06 | End: 2024-11-06

## 2024-11-06 ASSESSMENT — FIBROSIS 4 INDEX: FIB4 SCORE: 1.52

## 2024-11-06 ASSESSMENT — ENCOUNTER SYMPTOMS
FEVER: 0
CHILLS: 0
SHORTNESS OF BREATH: 0

## 2024-11-06 NOTE — PROGRESS NOTES
SUBJECTIVE:     CC: establish care; prior [facility]     HPI:   Rangel presents today with his wife, Argenis, the following:    ASSESSMENT & PLAN by Problem:     Problem   Dyslipidemia Associated With Type 2 Diabetes Mellitus (Hcc)    Chronic, uncontrolled.  Currently taking atorvastatin 40 mg daily.  Started atorvastatin 11/2024.     Uncontrolled Diabetes Mellitus With Hyperglycemia (Hcc)    Chronic, uncontrolled.     Current Diabetes Medication Regimen  Glargine 21U q HS  Meal time insulin, SS  Starting metformin  mg, 1 tab daily with a goal of 2 tabs twice daily    Goal is to no longer need sliding scale insulin and decrease Semglee as blood sugar is better controlled.     Peritonsillar Abscess    History of peritonsillar abscess 2/8/2024 and 11/1/2024.  Needs to schedule with ENT.  Still on antibiotic: augmentin.     Vitamin D Deficiency    Chronic, control unknown.  Tolerating/continue 2 OTC vitamin D daily.  Repeating labs in February.     Idiopathic Chronic Gout of Multiple Sites Without Tophus    Was told at some point he had gout.  No issues in years.     Chronic Left Shoulder Pain    Chronic, ongoing.  Aching.  Years.  Acetaminophen as needed.  Doesn't prevent him from doing things.     Chronic Pain of Both Knees    Chronic, ongoing.  Aching.  Years.  Acetaminophen as needed.  Doesn't prevent him from doing things.     Class 1 Obesity in Adult (Resolved)   Dka (Diabetic Ketoacidosis) (Hcc) (Resolved)   Sirs (Systemic Inflammatory Response Syndrome) (Hcc) (Resolved)   Prediabetes (Resolved)   Adhesions of Prepuce (Resolved)   Leg Swelling (Resolved)   Obesity (BMI 35.0-39.9 without comorbidity) (HCC) (Resolved)   Lumbar Back Pain (Resolved)       Admission 11/1/2024 -11/4/2024: Tonsillar abscess.     ENT follow-up: Needs to schedule follow-up    Colon cancer screen: Cologuard    Monofilament exam: Performed today  Retinopathy screen: Performed today    influenza, pneumonia: given today  Tdap: deferring  for now    Ordering Dexcom.  Continue fingersticks until Dexcom is available.    Follow-up in early December 2024 for blood sugar evaluation.  Repeat labs mid February 2025.    AVS printed for patient.  Went over the specifics of his new medications and follow-up instructions      Return in about 4 weeks (around 12/4/2024) for diabetes.        HPI:     Problem List Items Addressed This Visit       Chronic left shoulder pain     Chronic, ongoing.  Aching.  Years.  Acetaminophen as needed.  Doesn't prevent him from doing things.         Chronic pain of both knees     Chronic, ongoing.  Aching.  Years.  Acetaminophen as needed.  Doesn't prevent him from doing things.         Dyslipidemia associated with type 2 diabetes mellitus (HCC)     Chronic, uncontrolled.    Latest Labs:   Lab Results   Component Value Date/Time    CHOLSTRLTOT 211 (H) 11/03/2024 12:12 AM     (H) 11/03/2024 12:12 AM    HDL 42 11/03/2024 12:12 AM    TRIGLYCERIDE 186 (H) 11/03/2024 12:12 AM      Medications: Atorvastatin 40 mg daily. Started atorvastatin 11/2024.  Risk calculator: The 10-year ASCVD risk score (Lorie ANAND, et al., 2019) is: 10.4%            Relevant Medications    metFORMIN ER (GLUCOPHAGE XR) 500 MG TABLET SR 24 HR    atorvastatin (LIPITOR) 40 MG Tab    Other Relevant Orders    Lipid Profile    Idiopathic chronic gout of multiple sites without tophus    Peritonsillar abscess     History of peritonsillar abscess 2/8/2024 and 11/1/2024.    Hospital admission note 11/3/2024:  Patient has right peritonsillar abscess 2.9 x 1.8 cm.   I and D performed at the ER, 3 cc pus aspirated  Received 1 dose of IV Decadron   Started on Unasyn     Strep A PCR negative     11/3 reported worsening swelling and pain, plan to repeat I&D today  Culture grows MSSA  Continue Unasyn            Uncontrolled diabetes mellitus with hyperglycemia (HCC)     Chronic, uncontrolled.     Current Diabetes Medication Regimen  Glargine 21U q HS  Meal time insulin,  SS  Starting metformin  mg, 1 tab daily with a goal of 2 tabs twice daily    Previous Diabetes Medications and Reason for Discontinuation        A1c: 13.5 (11/2024); 6.3 (around 2021); 6.0 (around 2019); 6.6 (around 2018)  Microalb/Cr ratio:   Fasting sugars: 150-190  Diabetic foot exam: 11/2024  Retinal eye exam: 11/2024  ACEi/ARB: Starting lisinopril 2.5 mg 11/2024  Statin: Atorvastatin 40 mg started 11/2024  Aspirin: Patient agrees to start 81 mg 11/2024  Concomitant HTN:   Nightly foot checks:            Relevant Medications    metFORMIN ER (GLUCOPHAGE XR) 500 MG TABLET SR 24 HR    Continuous Glucose  (DEXCOM G6 ) Device    Continuous Glucose Sensor (DEXCOM G6 SENSOR) Misc    Continuous Glucose Transmitter (DEXCOM G6 TRANSMITTER) Misc    Other Relevant Orders    Diabetic Monofilament LE Exam (Completed)    POCT Retinal Eye Exam    Comp Metabolic Panel    HEMOGLOBIN A1C    MICROALBUMIN CREAT RATIO URINE    Vitamin D deficiency     Chronic, control unknown.  Tolerating/continue 2 OTC vitamin D daily.  Repeating labs in February.         Relevant Orders    VITAMIN D,25 HYDROXY (DEFICIENCY)     Other Visit Diagnoses       Encounter for screening for malignant neoplasm of prostate        Relevant Orders    PROSTATE SPECIFIC AG SCREENING    Fatigue, unspecified type        Relevant Orders    CBC WITH DIFFERENTIAL    TSH WITH REFLEX TO FT4    Testosterone, Free & Total, Adult Male (w/SHBG)    VITAMIN B12    Screening examination for sexually transmitted disease        Relevant Orders    HIV AG/AB COMBO ASSAY SCREENING    Need for hepatitis C screening test        Relevant Orders    HEP C VIRUS ANTIBODY    Need for vaccination        Relevant Orders    INFLUENZA VACCINE TRI INJ (PF) (Completed)    Pneumococcal Conjugate Vaccine 20-Valent (6 wks+) (Completed)    Colon cancer screening        Relevant Orders    Cologuard® colon cancer screening                   ROS:  Review of Systems  "  Constitutional:  Negative for chills and fever.   Respiratory:  Negative for shortness of breath.    Cardiovascular:  Negative for chest pain.       OBJECTIVE:     Exam:  /64 (BP Location: Left arm, Patient Position: Sitting, BP Cuff Size: Large adult)   Pulse 80   Temp 36.1 °C (96.9 °F) (Temporal)   Resp 16   Ht 1.778 m (5' 10\")   Wt 104 kg (228 lb 4.8 oz)   SpO2 96%   BMI 32.76 kg/m²  Body mass index is 32.76 kg/m².    Physical Exam  Vitals reviewed.   Constitutional:       General: He is not in acute distress.     Appearance: Normal appearance.   Pulmonary:      Effort: Pulmonary effort is normal.   Musculoskeletal:         General: No swelling.      Comments: Varicosities noted on the left lower leg.  Hemosiderin deposits noted on the medial aspect of the left lower leg.   Neurological:      General: No focal deficit present.      Mental Status: He is alert.   Psychiatric:         Mood and Affect: Mood normal.         Behavior: Behavior normal.         Judgment: Judgment normal.       Monofilament testing with a 10 gram force: sensation intact: intact bilaterally  Visual Inspection: Feet without maceration, ulcers, fissures.  Pedal pulses: intact bilaterally      CHART REVIEW:     Labs:                             1.  High-grade partial but not full-thickness tear of the distal supraspinatus tendon     2.  Subscapularis and infraspinatus tendinopathy     3.  Cystic change within the lesser tuberosity     4.  Mild acromioclavicular joint osteoarthritis     5.  Small amount of fluid within the subacromial/subdeltoid bursa      No prior study is available for comparison.   Mild concentric left ventricular hypertrophy.  Normal left ventricular systolic function.  Left ventricular ejection fraction is visually estimated to be 55%.  Indeterminate diastolic function.  Normal inferior vena cava size and inspiratory collapse.       Conclusions   Bilateral.    There is no evidence of arterial disease " demonstrated (DOMO is .9-1.0).        NUCLEAR IMAGING INTERPRETATION    No myocardial infarct or reversible ischemia.    Normal LEFT ventricular function.    ECG INTERPRETATION    Negative stress ECG for ischemia.        Time: 57 minutes in total spent on patient care including pre-charting, record review, documentation and, at times, discussion with healthcare staff.  This includes face-to-face time for exam, review, discussion, as well as counseling and coordinating care.         Please note that this dictation was created using voice recognition software. I have made every reasonable attempt to correct obvious errors, but I expect that there are errors of grammar and possibly content that I did not discover before finalizing the note.

## 2024-11-06 NOTE — ASSESSMENT & PLAN NOTE
Chronic, uncontrolled.    Latest Labs:   Lab Results   Component Value Date/Time    CHOLSTRLTOT 211 (H) 11/03/2024 12:12 AM     (H) 11/03/2024 12:12 AM    HDL 42 11/03/2024 12:12 AM    TRIGLYCERIDE 186 (H) 11/03/2024 12:12 AM      Medications: Atorvastatin 40 mg daily. Started atorvastatin 11/2024.  Risk calculator: The 10-year ASCVD risk score (Lorie ANAND, et al., 2019) is: 10.4%

## 2024-11-06 NOTE — ASSESSMENT & PLAN NOTE
Chronic, uncontrolled.     Current Diabetes Medication Regimen  Glargine 21U q HS  Meal time insulin, SS  Starting metformin  mg, 1 tab daily with a goal of 2 tabs twice daily    Previous Diabetes Medications and Reason for Discontinuation        A1c: 13.5 (11/2024); 6.3 (around 2021); 6.0 (around 2019); 6.6 (around 2018)  Microalb/Cr ratio:   Fasting sugars: 150-190  Diabetic foot exam: 11/2024  Retinal eye exam: 11/2024  ACEi/ARB: Starting lisinopril 2.5 mg 11/2024  Statin: Atorvastatin 40 mg started 11/2024  Aspirin: Patient agrees to start 81 mg 11/2024  Concomitant HTN:   Nightly foot checks:

## 2024-11-06 NOTE — ASSESSMENT & PLAN NOTE
Chronic, control unknown.  Tolerating/continue 2 OTC vitamin D daily.  Repeating labs in February.

## 2024-11-06 NOTE — ASSESSMENT & PLAN NOTE
History of peritonsillar abscess 2/8/2024 and 11/1/2024.    Hospital admission note 11/3/2024:  Patient has right peritonsillar abscess 2.9 x 1.8 cm.   I and D performed at the ER, 3 cc pus aspirated  Received 1 dose of IV Decadron   Started on Unasyn     Strep A PCR negative     11/3 reported worsening swelling and pain, plan to repeat I&D today  Culture grows MSSA  Continue Unasyn

## 2024-11-06 NOTE — PATIENT INSTRUCTIONS
You are starting the following:    Metformin XR 500mg: start with 1 pill every morning. Increase by 1 pill daily, as tolerated, every 5-7 days.    Lisinopril 2.5mg. this is one pill daily. Helps protect your kidneys. Start 1 week after starting metformin.    Recommend starting low dose aspirin daily, 81mg.    Follow up in one month for blood sugar follow up.

## 2024-11-06 NOTE — ASSESSMENT & PLAN NOTE
Chronic, ongoing.  Aching.  Years.  Acetaminophen as needed.  Doesn't prevent him from doing things.

## 2024-11-07 LAB — RETINAL SCREEN: NEGATIVE

## 2024-11-11 DIAGNOSIS — E78.5 DYSLIPIDEMIA ASSOCIATED WITH TYPE 2 DIABETES MELLITUS (HCC): ICD-10-CM

## 2024-11-11 DIAGNOSIS — E11.69 DYSLIPIDEMIA ASSOCIATED WITH TYPE 2 DIABETES MELLITUS (HCC): ICD-10-CM

## 2024-11-26 ENCOUNTER — TELEPHONE (OUTPATIENT)
Dept: HEALTH INFORMATION MANAGEMENT | Facility: OTHER | Age: 54
End: 2024-11-26
Payer: COMMERCIAL

## 2024-12-02 DIAGNOSIS — E11.65 TYPE 2 DIABETES MELLITUS WITH HYPERGLYCEMIA, UNSPECIFIED WHETHER LONG TERM INSULIN USE (HCC): ICD-10-CM

## 2024-12-02 RX ORDER — INSULIN GLARGINE-YFGN 100 [IU]/ML
21 INJECTION, SOLUTION SUBCUTANEOUS EVERY EVENING
Qty: 9 ML | Refills: 0 | Status: SHIPPED | OUTPATIENT
Start: 2024-12-02 | End: 2025-01-01

## 2024-12-02 NOTE — TELEPHONE ENCOUNTER
Received request via: Patient    Was the patient seen in the last year in this department? Yes    Does the patient have an active prescription (recently filled or refills available) for medication(s) requested? No    Does the patient have intermediate Plus and need 100-day supply? (This applies to ALL medications) Patient does not have SCP

## 2024-12-06 ENCOUNTER — OFFICE VISIT (OUTPATIENT)
Dept: MEDICAL GROUP | Facility: PHYSICIAN GROUP | Age: 54
End: 2024-12-06
Payer: COMMERCIAL

## 2024-12-06 VITALS
RESPIRATION RATE: 16 BRPM | HEART RATE: 73 BPM | BODY MASS INDEX: 35.71 KG/M2 | SYSTOLIC BLOOD PRESSURE: 118 MMHG | HEIGHT: 70 IN | TEMPERATURE: 97.6 F | DIASTOLIC BLOOD PRESSURE: 58 MMHG | OXYGEN SATURATION: 97 % | WEIGHT: 249.4 LBS

## 2024-12-06 DIAGNOSIS — E11.65 UNCONTROLLED TYPE 2 DIABETES MELLITUS WITH HYPERGLYCEMIA (HCC): ICD-10-CM

## 2024-12-06 DIAGNOSIS — E78.5 DYSLIPIDEMIA ASSOCIATED WITH TYPE 2 DIABETES MELLITUS (HCC): ICD-10-CM

## 2024-12-06 DIAGNOSIS — E11.69 DYSLIPIDEMIA ASSOCIATED WITH TYPE 2 DIABETES MELLITUS (HCC): ICD-10-CM

## 2024-12-06 DIAGNOSIS — E11.65 TYPE 2 DIABETES MELLITUS WITH HYPERGLYCEMIA, UNSPECIFIED WHETHER LONG TERM INSULIN USE (HCC): ICD-10-CM

## 2024-12-06 PROCEDURE — 3074F SYST BP LT 130 MM HG: CPT | Performed by: PHYSICIAN ASSISTANT

## 2024-12-06 PROCEDURE — 3078F DIAST BP <80 MM HG: CPT | Performed by: PHYSICIAN ASSISTANT

## 2024-12-06 PROCEDURE — 99214 OFFICE O/P EST MOD 30 MIN: CPT | Performed by: PHYSICIAN ASSISTANT

## 2024-12-06 RX ORDER — TIRZEPATIDE 2.5 MG/.5ML
2.5 INJECTION, SOLUTION SUBCUTANEOUS
Qty: 2 ML | Refills: 1 | Status: SHIPPED | OUTPATIENT
Start: 2024-12-06

## 2024-12-06 RX ORDER — TIRZEPATIDE 2.5 MG/.5ML
2.5 INJECTION, SOLUTION SUBCUTANEOUS
Qty: 6 ML | Refills: 0 | Status: SHIPPED | OUTPATIENT
Start: 2024-12-06 | End: 2024-12-06

## 2024-12-06 RX ORDER — ATORVASTATIN CALCIUM 40 MG/1
40 TABLET, FILM COATED ORAL EVERY EVENING
Qty: 90 TABLET | Refills: 0 | Status: SHIPPED | OUTPATIENT
Start: 2024-12-06

## 2024-12-06 ASSESSMENT — ENCOUNTER SYMPTOMS
CHILLS: 0
SHORTNESS OF BREATH: 0
FEVER: 0

## 2024-12-06 ASSESSMENT — FIBROSIS 4 INDEX: FIB4 SCORE: 1.52

## 2024-12-06 NOTE — PROGRESS NOTES
SUBJECTIVE:     CC: Blood sugar follow-up; established care 11/6/2024.    HPI:   Won presents today with the following:    ASSESSMENT & PLAN by Problem:     Problem   Uncontrolled Diabetes Mellitus With Hyperglycemia (Hcc)    Chronic, uncontrolled.     Current Diabetes Medication Regimen  Glargine 21U q HS (out of this since around 12/2/2024)  Meal time insulin, SS (1-2U with meals)  tolerating metformin  mg, 2 tab BID  Adding mounjaro 2.5mg every 7 days    Goal is to no longer need sliding scale insulin and decrease Semglee as blood sugar is better controlled.         Follow-up instructions from 11/6/2020 for primary care visit:  Repeat labs mid February 2025.          Return in about 4 weeks (around 1/3/2025) for diabetes.         HPI:     Problem List Items Addressed This Visit       Dyslipidemia associated with type 2 diabetes mellitus (HCC)    Relevant Medications    atorvastatin (LIPITOR) 40 MG Tab    Tirzepatide (MOUNJARO) 2.5 MG/0.5ML Solution Auto-injector    Uncontrolled diabetes mellitus with hyperglycemia (HCC)     Chronic, uncontrolled.     Current Diabetes Medication Regimen  Glargine 21U q HS (out of this since around 12/2/2024)  Meal time insulin, SS (1-2U with meals)  tolerating metformin  mg, 2 tab BID  Adding mounjaro 2.5mg every 7 days    Previous Diabetes Medications and Reason for Discontinuation        A1c: 13.5 (11/2024); 6.3 (around 2021); 6.0 (around 2019); 6.6 (around 2018)  Microalb/Cr ratio:   Fasting sugars: 150-190  Diabetic foot exam: 11/2024  Retinal eye exam: 11/2024  ACEi/ARB: Starting lisinopril 2.5 mg 11/2024  Statin: Atorvastatin 40 mg started 11/2024  Aspirin: Patient agrees to start 81 mg 11/2024  Concomitant HTN:   Nightly foot checks:            Relevant Medications    Tirzepatide (MOUNJARO) 2.5 MG/0.5ML Solution Auto-injector     Other Visit Diagnoses       Type 2 diabetes mellitus with hyperglycemia, unspecified whether long term insulin use (HCC)         "Relevant Medications    Tirzepatide (MOUNJARO) 2.5 MG/0.5ML Solution Auto-injector    Insulin Pen Needle 32 G x 4 mm                   ROS:  Review of Systems   Constitutional:  Negative for chills and fever.   Respiratory:  Negative for shortness of breath.    Cardiovascular:  Negative for chest pain.       OBJECTIVE:     Exam:  /58 (BP Location: Left arm, Patient Position: Sitting, BP Cuff Size: Large adult)   Pulse 73   Temp 36.4 °C (97.6 °F) (Temporal)   Resp 16   Ht 1.778 m (5' 10\")   Wt 113 kg (249 lb 6.4 oz)   SpO2 97%   BMI 35.79 kg/m²  Body mass index is 35.79 kg/m².    Physical Exam  Vitals reviewed.   Constitutional:       General: He is not in acute distress.     Appearance: Normal appearance.   Pulmonary:      Effort: Pulmonary effort is normal.   Neurological:      General: No focal deficit present.      Mental Status: He is alert.   Psychiatric:         Mood and Affect: Mood normal.         Behavior: Behavior normal.         Judgment: Judgment normal.                 Please note that this dictation was created using voice recognition software. I have made every reasonable attempt to correct obvious errors, but I expect that there are errors of grammar and possibly content that I did not discover before finalizing the note.    "

## 2024-12-06 NOTE — PATIENT INSTRUCTIONS
Long-acting insulin:   Adjust dose according to FASTING BLOOD GLUCOSE target 100-130 mg/dL  (1) Increase the insulin dose every 3-4 days as needed.   (2) Increase by 2 units if FBG average concentration is 131-170 mg/dL.   (3) Increase by 4 units if FBG average concentration is 171-210 mg/dL.   (4) Increase by 6 units if FBG average concentration is 221-260 mg/dL.   (5) Increase by 8 units if FBG average concentration is greater than 261mg/dL and call us.   Consider cutting back by 1-2 units to previous dose if glucose concentration is below 100 mg/dL or symptoms of hypoglycemia.

## 2024-12-06 NOTE — ASSESSMENT & PLAN NOTE
Chronic, uncontrolled.     Current Diabetes Medication Regimen  Glargine 21U q HS (out of this since around 12/2/2024)  Meal time insulin, SS (1-2U with meals)  tolerating metformin  mg, 2 tab BID  Adding mounjaro 2.5mg every 7 days    Previous Diabetes Medications and Reason for Discontinuation        A1c: 13.5 (11/2024); 6.3 (around 2021); 6.0 (around 2019); 6.6 (around 2018)  Microalb/Cr ratio:   Fasting sugars: 150-190  Diabetic foot exam: 11/2024  Retinal eye exam: 11/2024  ACEi/ARB: Starting lisinopril 2.5 mg 11/2024  Statin: Atorvastatin 40 mg started 11/2024  Aspirin: Patient agrees to start 81 mg 11/2024  Concomitant HTN:   Nightly foot checks:

## 2025-01-10 ENCOUNTER — OFFICE VISIT (OUTPATIENT)
Dept: MEDICAL GROUP | Facility: PHYSICIAN GROUP | Age: 55
End: 2025-01-10
Payer: COMMERCIAL

## 2025-01-10 VITALS
OXYGEN SATURATION: 96 % | HEART RATE: 71 BPM | HEIGHT: 70 IN | RESPIRATION RATE: 16 BRPM | BODY MASS INDEX: 36.81 KG/M2 | DIASTOLIC BLOOD PRESSURE: 70 MMHG | WEIGHT: 257.1 LBS | SYSTOLIC BLOOD PRESSURE: 110 MMHG | TEMPERATURE: 98.1 F

## 2025-01-10 DIAGNOSIS — J36 PERITONSILLAR ABSCESS: ICD-10-CM

## 2025-01-10 DIAGNOSIS — G89.29 CHRONIC LEFT SHOULDER PAIN: ICD-10-CM

## 2025-01-10 DIAGNOSIS — M25.561 CHRONIC PAIN OF BOTH KNEES: ICD-10-CM

## 2025-01-10 DIAGNOSIS — E11.65 UNCONTROLLED TYPE 2 DIABETES MELLITUS WITH HYPERGLYCEMIA (HCC): ICD-10-CM

## 2025-01-10 DIAGNOSIS — M25.562 CHRONIC PAIN OF BOTH KNEES: ICD-10-CM

## 2025-01-10 DIAGNOSIS — M25.512 CHRONIC LEFT SHOULDER PAIN: ICD-10-CM

## 2025-01-10 DIAGNOSIS — G89.29 CHRONIC PAIN OF BOTH KNEES: ICD-10-CM

## 2025-01-10 PROCEDURE — 3074F SYST BP LT 130 MM HG: CPT | Performed by: PHYSICIAN ASSISTANT

## 2025-01-10 PROCEDURE — 3078F DIAST BP <80 MM HG: CPT | Performed by: PHYSICIAN ASSISTANT

## 2025-01-10 PROCEDURE — 99214 OFFICE O/P EST MOD 30 MIN: CPT | Performed by: PHYSICIAN ASSISTANT

## 2025-01-10 RX ORDER — PIOGLITAZONE 15 MG/1
15 TABLET ORAL DAILY
Qty: 90 TABLET | Refills: 1 | Status: SHIPPED | OUTPATIENT
Start: 2025-01-10

## 2025-01-10 ASSESSMENT — ENCOUNTER SYMPTOMS
CHILLS: 0
SHORTNESS OF BREATH: 0
FEVER: 0

## 2025-01-10 ASSESSMENT — PATIENT HEALTH QUESTIONNAIRE - PHQ9: CLINICAL INTERPRETATION OF PHQ2 SCORE: 0

## 2025-01-10 ASSESSMENT — FIBROSIS 4 INDEX: FIB4 SCORE: 1.52

## 2025-01-10 NOTE — ASSESSMENT & PLAN NOTE
Interval history 11/6/2024:  History of peritonsillar abscess 2/8/2024 and 11/1/2024.  Needs to schedule with ENT.  Still on antibiotic: augmentin.

## 2025-01-10 NOTE — ASSESSMENT & PLAN NOTE
Chronic, uncontrolled.     Current Diabetes Medication Regimen  Glargine 21U q HS (out of this since around 12/2/2024 , insurance still hasn't approved)  Meal time insulin, SS (1-2U with meals)  tolerating metformin  mg, 2 tab BID  Adding mounjaro 2.5mg every 7 days (insurance still hasn't approved)  Adding actos 15mg    Previous Diabetes Medications and Reason for Discontinuation        A1c: 13.5 (11/2024); 6.3 (around 2021); 6.0 (around 2019); 6.6 (around 2018)  Microalb/Cr ratio:   Fasting sugars: 150-190  Diabetic foot exam: 11/2024  Retinal eye exam: 11/2024  ACEi/ARB: Starting lisinopril 2.5 mg 11/2024  Statin: Atorvastatin 40 mg started 11/2024  Aspirin: Patient agrees to start 81 mg 11/2024  Concomitant HTN:   Nightly foot checks:

## 2025-02-07 NOTE — TELEPHONE ENCOUNTER
Received request via: Patient    Was the patient seen in the last year in this department? Yes    Does the patient have an active prescription (recently filled or refills available) for medication(s) requested? No    Does the patient have [facility] Plus and need 100-day supply? (This applies to ALL medications) Patient does not have SCP   No

## 2025-02-09 RX ORDER — LISINOPRIL 2.5 MG/1
2.5 TABLET ORAL DAILY
Qty: 90 TABLET | Refills: 0 | Status: SHIPPED | OUTPATIENT
Start: 2025-02-09 | End: 2025-02-28 | Stop reason: SDUPTHER

## 2025-02-20 ENCOUNTER — HOSPITAL ENCOUNTER (OUTPATIENT)
Dept: LAB | Facility: MEDICAL CENTER | Age: 55
End: 2025-02-20
Attending: PHYSICIAN ASSISTANT
Payer: COMMERCIAL

## 2025-02-20 ENCOUNTER — RESULTS FOLLOW-UP (OUTPATIENT)
Dept: MEDICAL GROUP | Facility: PHYSICIAN GROUP | Age: 55
End: 2025-02-20
Payer: COMMERCIAL

## 2025-02-20 DIAGNOSIS — E78.5 DYSLIPIDEMIA ASSOCIATED WITH TYPE 2 DIABETES MELLITUS (HCC): ICD-10-CM

## 2025-02-20 DIAGNOSIS — Z12.5 ENCOUNTER FOR SCREENING FOR MALIGNANT NEOPLASM OF PROSTATE: ICD-10-CM

## 2025-02-20 DIAGNOSIS — Z11.59 NEED FOR HEPATITIS C SCREENING TEST: ICD-10-CM

## 2025-02-20 DIAGNOSIS — Z11.3 SCREENING EXAMINATION FOR SEXUALLY TRANSMITTED DISEASE: ICD-10-CM

## 2025-02-20 DIAGNOSIS — E55.9 VITAMIN D DEFICIENCY: ICD-10-CM

## 2025-02-20 DIAGNOSIS — R53.83 FATIGUE, UNSPECIFIED TYPE: ICD-10-CM

## 2025-02-20 DIAGNOSIS — E11.69 DYSLIPIDEMIA ASSOCIATED WITH TYPE 2 DIABETES MELLITUS (HCC): ICD-10-CM

## 2025-02-20 DIAGNOSIS — E11.65 UNCONTROLLED TYPE 2 DIABETES MELLITUS WITH HYPERGLYCEMIA (HCC): ICD-10-CM

## 2025-02-20 LAB
BASOPHILS # BLD AUTO: 0.3 % (ref 0–1.8)
BASOPHILS # BLD: 0.02 K/UL (ref 0–0.12)
EOSINOPHIL # BLD AUTO: 0.1 K/UL (ref 0–0.51)
EOSINOPHIL NFR BLD: 1.7 % (ref 0–6.9)
ERYTHROCYTE [DISTWIDTH] IN BLOOD BY AUTOMATED COUNT: 43 FL (ref 35.9–50)
EST. AVERAGE GLUCOSE BLD GHB EST-MCNC: 163 MG/DL
HBA1C MFR BLD: 7.3 % (ref 4–5.6)
HCT VFR BLD AUTO: 53.5 % (ref 42–52)
HCV AB SER QL: NORMAL
HGB BLD-MCNC: 17.1 G/DL (ref 14–18)
HIV 1+2 AB+HIV1 P24 AG SERPL QL IA: NORMAL
IMM GRANULOCYTES # BLD AUTO: 0.02 K/UL (ref 0–0.11)
IMM GRANULOCYTES NFR BLD AUTO: 0.3 % (ref 0–0.9)
LYMPHOCYTES # BLD AUTO: 1.73 K/UL (ref 1–4.8)
LYMPHOCYTES NFR BLD: 28.6 % (ref 22–41)
MCH RBC QN AUTO: 28.4 PG (ref 27–33)
MCHC RBC AUTO-ENTMCNC: 32 G/DL (ref 32.3–36.5)
MCV RBC AUTO: 88.9 FL (ref 81.4–97.8)
MONOCYTES # BLD AUTO: 0.44 K/UL (ref 0–0.85)
MONOCYTES NFR BLD AUTO: 7.3 % (ref 0–13.4)
NEUTROPHILS # BLD AUTO: 3.74 K/UL (ref 1.82–7.42)
NEUTROPHILS NFR BLD: 61.8 % (ref 44–72)
NRBC # BLD AUTO: 0 K/UL
NRBC BLD-RTO: 0 /100 WBC (ref 0–0.2)
PLATELET # BLD AUTO: 207 K/UL (ref 164–446)
PMV BLD AUTO: 11.1 FL (ref 9–12.9)
RBC # BLD AUTO: 6.02 M/UL (ref 4.7–6.1)
WBC # BLD AUTO: 6.1 K/UL (ref 4.8–10.8)

## 2025-02-20 PROCEDURE — 84153 ASSAY OF PSA TOTAL: CPT

## 2025-02-20 PROCEDURE — 82570 ASSAY OF URINE CREATININE: CPT

## 2025-02-20 PROCEDURE — 84402 ASSAY OF FREE TESTOSTERONE: CPT

## 2025-02-20 PROCEDURE — 86803 HEPATITIS C AB TEST: CPT

## 2025-02-20 PROCEDURE — 80053 COMPREHEN METABOLIC PANEL: CPT

## 2025-02-20 PROCEDURE — 84403 ASSAY OF TOTAL TESTOSTERONE: CPT

## 2025-02-20 PROCEDURE — 82043 UR ALBUMIN QUANTITATIVE: CPT

## 2025-02-20 PROCEDURE — 84443 ASSAY THYROID STIM HORMONE: CPT

## 2025-02-20 PROCEDURE — 87389 HIV-1 AG W/HIV-1&-2 AB AG IA: CPT

## 2025-02-20 PROCEDURE — 85025 COMPLETE CBC W/AUTO DIFF WBC: CPT

## 2025-02-20 PROCEDURE — 82306 VITAMIN D 25 HYDROXY: CPT

## 2025-02-20 PROCEDURE — 80061 LIPID PANEL: CPT

## 2025-02-20 PROCEDURE — 83036 HEMOGLOBIN GLYCOSYLATED A1C: CPT

## 2025-02-20 PROCEDURE — 82607 VITAMIN B-12: CPT

## 2025-02-20 PROCEDURE — 36415 COLL VENOUS BLD VENIPUNCTURE: CPT

## 2025-02-20 PROCEDURE — 84270 ASSAY OF SEX HORMONE GLOBUL: CPT

## 2025-02-21 LAB
25(OH)D3 SERPL-MCNC: 31 NG/ML (ref 30–100)
ALBUMIN SERPL BCP-MCNC: 4.4 G/DL (ref 3.2–4.9)
ALBUMIN/GLOB SERPL: 1.6 G/DL
ALP SERPL-CCNC: 44 U/L (ref 30–99)
ALT SERPL-CCNC: 16 U/L (ref 2–50)
ANION GAP SERPL CALC-SCNC: 11 MMOL/L (ref 7–16)
AST SERPL-CCNC: 18 U/L (ref 12–45)
BILIRUB SERPL-MCNC: 0.4 MG/DL (ref 0.1–1.5)
BUN SERPL-MCNC: 15 MG/DL (ref 8–22)
CALCIUM ALBUM COR SERPL-MCNC: 8.9 MG/DL (ref 8.5–10.5)
CALCIUM SERPL-MCNC: 9.2 MG/DL (ref 8.5–10.5)
CHLORIDE SERPL-SCNC: 104 MMOL/L (ref 96–112)
CHOLEST SERPL-MCNC: 125 MG/DL (ref 100–199)
CO2 SERPL-SCNC: 27 MMOL/L (ref 20–33)
CREAT SERPL-MCNC: 0.98 MG/DL (ref 0.5–1.4)
CREAT UR-MCNC: 134 MG/DL
GFR SERPLBLD CREATININE-BSD FMLA CKD-EPI: 93 ML/MIN/1.73 M 2
GLOBULIN SER CALC-MCNC: 2.8 G/DL (ref 1.9–3.5)
GLUCOSE SERPL-MCNC: 139 MG/DL (ref 65–99)
HDLC SERPL-MCNC: 44 MG/DL
LDLC SERPL CALC-MCNC: 64 MG/DL
MICROALBUMIN UR-MCNC: 1.7 MG/DL
MICROALBUMIN/CREAT UR: 13 MG/G (ref 0–30)
POTASSIUM SERPL-SCNC: 4.5 MMOL/L (ref 3.6–5.5)
PROT SERPL-MCNC: 7.2 G/DL (ref 6–8.2)
PSA SERPL DL<=0.01 NG/ML-MCNC: 1.7 NG/ML (ref 0–4)
SODIUM SERPL-SCNC: 142 MMOL/L (ref 135–145)
TRIGL SERPL-MCNC: 83 MG/DL (ref 0–149)
TSH SERPL DL<=0.005 MIU/L-ACNC: 1.55 UIU/ML (ref 0.38–5.33)
VIT B12 SERPL-MCNC: 363 PG/ML (ref 211–911)

## 2025-02-22 LAB
SHBG SERPL-SCNC: 31 NMOL/L (ref 19–76)
TESTOST FREE MFR SERPL: 1.9 % (ref 1.6–2.9)
TESTOST FREE SERPL-MCNC: 77 PG/ML (ref 47–244)
TESTOST SERPL-MCNC: 404 NG/DL (ref 300–890)

## 2025-02-24 NOTE — TELEPHONE ENCOUNTER
Received request via: Patient    Was the patient seen in the last year in this department? Yes    Does the patient have an active prescription (recently filled or refills available) for medication(s) requested? No    Pharmacy Name: [id number] - [place], [place] - [address] AT [facility]     Does the patient have [facility] Plus and need 100-day supply? (This applies to ALL medications) Patient does not have SCP

## 2025-02-25 RX ORDER — METFORMIN HYDROCHLORIDE 500 MG/1
1000 TABLET, EXTENDED RELEASE ORAL 2 TIMES DAILY
Qty: 360 TABLET | Refills: 0 | Status: SHIPPED | OUTPATIENT
Start: 2025-02-25 | End: 2025-02-28 | Stop reason: SDUPTHER

## 2025-02-28 ENCOUNTER — OFFICE VISIT (OUTPATIENT)
Dept: MEDICAL GROUP | Facility: PHYSICIAN GROUP | Age: 55
End: 2025-02-28
Payer: COMMERCIAL

## 2025-02-28 VITALS
HEART RATE: 87 BPM | DIASTOLIC BLOOD PRESSURE: 72 MMHG | WEIGHT: 271 LBS | BODY MASS INDEX: 41.07 KG/M2 | SYSTOLIC BLOOD PRESSURE: 120 MMHG | HEIGHT: 68 IN | RESPIRATION RATE: 16 BRPM | OXYGEN SATURATION: 6 % | TEMPERATURE: 97.2 F

## 2025-02-28 DIAGNOSIS — E55.9 VITAMIN D DEFICIENCY: ICD-10-CM

## 2025-02-28 DIAGNOSIS — E78.5 DYSLIPIDEMIA ASSOCIATED WITH TYPE 2 DIABETES MELLITUS (HCC): ICD-10-CM

## 2025-02-28 DIAGNOSIS — Z12.11 COLON CANCER SCREENING: ICD-10-CM

## 2025-02-28 DIAGNOSIS — E11.69 DYSLIPIDEMIA ASSOCIATED WITH TYPE 2 DIABETES MELLITUS (HCC): ICD-10-CM

## 2025-02-28 DIAGNOSIS — E11.65 UNCONTROLLED TYPE 2 DIABETES MELLITUS WITH HYPERGLYCEMIA (HCC): ICD-10-CM

## 2025-02-28 RX ORDER — LISINOPRIL 2.5 MG/1
2.5 TABLET ORAL DAILY
Qty: 90 TABLET | Refills: 3 | Status: SHIPPED | OUTPATIENT
Start: 2025-02-28

## 2025-02-28 RX ORDER — PIOGLITAZONE 15 MG/1
15 TABLET ORAL DAILY
Qty: 90 TABLET | Refills: 1 | Status: SHIPPED | OUTPATIENT
Start: 2025-02-28

## 2025-02-28 RX ORDER — TIRZEPATIDE 2.5 MG/.5ML
2.5 INJECTION, SOLUTION SUBCUTANEOUS
Qty: 6 ML | Refills: 1 | Status: SHIPPED | OUTPATIENT
Start: 2025-02-28

## 2025-02-28 RX ORDER — ATORVASTATIN CALCIUM 40 MG/1
40 TABLET, FILM COATED ORAL EVERY EVENING
Qty: 90 TABLET | Refills: 3 | Status: SHIPPED | OUTPATIENT
Start: 2025-02-28

## 2025-02-28 RX ORDER — METFORMIN HYDROCHLORIDE 500 MG/1
1000 TABLET, EXTENDED RELEASE ORAL 2 TIMES DAILY
Qty: 360 TABLET | Refills: 1 | Status: SHIPPED | OUTPATIENT
Start: 2025-02-28

## 2025-02-28 ASSESSMENT — FIBROSIS 4 INDEX: FIB4 SCORE: 1.13

## 2025-02-28 ASSESSMENT — ENCOUNTER SYMPTOMS
CHILLS: 0
SHORTNESS OF BREATH: 0
FEVER: 0

## 2025-02-28 NOTE — PROGRESS NOTES
SUBJECTIVE:     CC: diabetes    HPI:   Bill presents today with the following:    ASSESSMENT & PLAN by Problem:     Problem   Dyslipidemia Associated With Type 2 Diabetes Mellitus (Hcc)    Chronic, stable.   Tolerating/continue atorvastatin 40 mg daily.     Uncontrolled Diabetes Mellitus With Hyperglycemia (Hcc)    Chronic, uncontrolled.   Improving.    Current Diabetes Medication Regimen  tolerating metformin  mg, 2 tab BID  SENDING AGAIN: mounjaro 2.5mg every 7 days (insurance still hasn't approved)  actos 15mg    Previous Diabetes Medications and Reason for Discontinuation  Glargine 21U q HS (out of this since around 12/2/2024 , insurance still hasn't approved) - -> will d/c due to A1C 7.3  Meal time insulin, SS (1-2U with meals); maybe once/day, 1-2U D/C mealtime insulin     Vitamin D Deficiency    Chronic, stable.  Level is just above normal at 31 (2/2025).  Tolerating/continue 2 OTC vitamin D daily.  Increase to 3 tabs daily.       Colon cancer screen: [place] ordered again    Tdap: declines    Return to clinic early June 2025 for POC A1c.    Return in about 14 weeks (around 6/6/2025).      HPI:     Problem List Items Addressed This Visit       Dyslipidemia associated with type 2 diabetes mellitus (HCC)    Chronic, stable.     Latest Labs:   Lab Results   Component Value Date/Time    CHOLSTRLTOT 125 02/20/2025 09:21 AM    LDL 64 02/20/2025 09:21 AM    HDL 44 02/20/2025 09:21 AM    TRIGLYCERIDE 83 02/20/2025 09:21 AM        Medications: Tolerating/continue atorvastatin 40 mg daily.    Risk calculator: The ASCVD Risk score ([place] DK, et al., 2019) failed to calculate.            Relevant Medications    Tirzepatide (MOUNJARO) 2.5 MG/0.5ML Solution Auto-injector    pioglitazone (ACTOS) 15 MG Tab    metFORMIN ER (GLUCOPHAGE XR) 500 MG TABLET SR 24 HR    atorvastatin (LIPITOR) 40 MG Tab    Uncontrolled diabetes mellitus with hyperglycemia (HCC)    Chronic, uncontrolled.   Improving.    Current Diabetes  "Medication Regimen  tolerating metformin  mg, 2 tab BID  SENDING AGAIN: mounjaro 2.5mg every 7 days (insurance still hasn't approved)  actos 15mg    Previous Diabetes Medications and Reason for Discontinuation  Glargine 21U q HS (out of this since around 12/2/2024 , insurance still hasn't approved) - -> will d/c due to A1C 7.3  Meal time insulin, SS (1-2U with meals); maybe once/day, 1-2U D/C mealtime insulin    A1c: 7.3 (2/2025); 13.5 (11/2024); 6.3 (around 2021); 6.0 (around 2019); 6.6 (around 2018)  Microalb/Cr ratio: 13 (2/2025)  Fasting sugars: 150-190  Diabetic foot exam: 11/2024  Retinal eye exam: 11/2024  ACEi/ARB: Starting lisinopril 2.5 mg 11/2024  Statin: Atorvastatin 40 mg started 11/2024  Aspirin: Patient agrees to start 81 mg 11/2024  Concomitant HTN:   Nightly foot checks:            Relevant Medications    Tirzepatide (MOUNJARO) 2.5 MG/0.5ML Solution Auto-injector    pioglitazone (ACTOS) 15 MG Tab    metFORMIN ER (GLUCOPHAGE XR) 500 MG TABLET SR 24 HR    lisinopril (PRINIVIL) 2.5 MG Tab    Vitamin D deficiency    Chronic, stable.  Level is just above normal at 31 (2/2025).  Tolerating/continue 2 OTC vitamin D daily.  Increase to 3 tabs daily.          Other Visit Diagnoses         Colon cancer screening        Relevant Orders    Cologuard® colon cancer screening                   ROS:  Review of Systems   Constitutional:  Negative for chills and fever.   Respiratory:  Negative for shortness of breath.    Cardiovascular:  Negative for chest pain.       OBJECTIVE:     Exam:  /72   Pulse 87   Temp 36.2 °C (97.2 °F) (Temporal)   Resp 16   Ht 1.727 m (5' 8\")   Wt 123 kg (271 lb)   SpO2 (!) 6%   BMI 41.21 kg/m²  Body mass index is 41.21 kg/m².    Physical Exam  Vitals reviewed.   Constitutional:       General: He is not in acute distress.     Appearance: Normal appearance.   Pulmonary:      Effort: Pulmonary effort is normal.   Neurological:      General: No focal deficit present.      " Mental Status: He is alert.   Psychiatric:         Mood and Affect: Mood normal.         Behavior: Behavior normal.         Judgment: Judgment normal.           CHART REVIEW:     Labs:                           Please note that this dictation was created using voice recognition software. I have made every reasonable attempt to correct obvious errors, but I expect that there are errors of grammar and possibly content that I did not discover before finalizing the note.

## 2025-02-28 NOTE — ASSESSMENT & PLAN NOTE
Chronic, uncontrolled.   Improving.    Current Diabetes Medication Regimen  tolerating metformin  mg, 2 tab BID  SENDING AGAIN: mounjaro 2.5mg every 7 days (insurance still hasn't approved)  actos 15mg    Previous Diabetes Medications and Reason for Discontinuation  Glargine 21U q HS (out of this since around 12/2/2024 , insurance still hasn't approved) - -> will d/c due to A1C 7.3  Meal time insulin, SS (1-2U with meals); maybe once/day, 1-2U D/C mealtime insulin    A1c: 7.3 (2/2025); 13.5 (11/2024); 6.3 (around 2021); 6.0 (around 2019); 6.6 (around 2018)  Microalb/Cr ratio: 13 (2/2025)  Fasting sugars: 150-190  Diabetic foot exam: 11/2024  Retinal eye exam: 11/2024  ACEi/ARB: Starting lisinopril 2.5 mg 11/2024  Statin: Atorvastatin 40 mg started 11/2024  Aspirin: Patient agrees to start 81 mg 11/2024  Concomitant HTN:   Nightly foot checks:

## 2025-02-28 NOTE — ASSESSMENT & PLAN NOTE
Chronic, stable.     Latest Labs:   Lab Results   Component Value Date/Time    CHOLSTRLTOT 125 02/20/2025 09:21 AM    LDL 64 02/20/2025 09:21 AM    HDL 44 02/20/2025 09:21 AM    TRIGLYCERIDE 83 02/20/2025 09:21 AM        Medications: Tolerating/continue atorvastatin 40 mg daily.    Risk calculator: The ASCVD Risk score (Lorie ANAND, et al., 2019) failed to calculate.

## 2025-02-28 NOTE — ASSESSMENT & PLAN NOTE
Chronic, stable.  Level is just above normal at 31 (2/2025).  Tolerating/continue 2 OTC vitamin D daily.  Increase to 3 tabs daily.

## 2025-03-06 ENCOUNTER — TELEPHONE (OUTPATIENT)
Dept: MEDICAL GROUP | Facility: PHYSICIAN GROUP | Age: 55
End: 2025-03-06
Payer: COMMERCIAL

## 2025-03-06 NOTE — TELEPHONE ENCOUNTER
PA for Mounjaro approved    Outcome  Approved today by Narciso Smart  PA Case: [id number], Status: Approved, Coverage Starts on: 3/6/2025 12:00:00 AM, Coverage Ends on: 8/23/2025 12:00:00 AM.  Effective Date: 3/5/2025  Authorization Expiration Date: 8/22/2025

## 2025-03-21 LAB — NONINV COLON CA DNA+OCC BLD SCRN STL QL: NEGATIVE

## 2025-03-25 ENCOUNTER — RESULTS FOLLOW-UP (OUTPATIENT)
Dept: MEDICAL GROUP | Facility: PHYSICIAN GROUP | Age: 55
End: 2025-03-25

## 2025-03-28 DIAGNOSIS — E11.69 DYSLIPIDEMIA ASSOCIATED WITH TYPE 2 DIABETES MELLITUS (HCC): ICD-10-CM

## 2025-03-28 DIAGNOSIS — E78.5 DYSLIPIDEMIA ASSOCIATED WITH TYPE 2 DIABETES MELLITUS (HCC): ICD-10-CM

## 2025-03-31 RX ORDER — ATORVASTATIN CALCIUM 40 MG/1
40 TABLET, FILM COATED ORAL EVERY EVENING
Qty: 90 TABLET | Refills: 3 | Status: SHIPPED | OUTPATIENT
Start: 2025-03-31

## 2025-03-31 NOTE — TELEPHONE ENCOUNTER
Received request via: Pharmacy    Was the patient seen in the last year in this department? Yes    Does the patient have an active prescription (recently filled or refills available) for medication(s) requested? No    [facility] Name[email or web address]    Does the patient have [facility]PC Plus and need 100-day supply? (This applies to ALL medications) Patient does not have SCP

## 2025-05-11 DIAGNOSIS — E11.65 UNCONTROLLED TYPE 2 DIABETES MELLITUS WITH HYPERGLYCEMIA (HCC): ICD-10-CM

## 2025-05-13 RX ORDER — LISINOPRIL 2.5 MG/1
TABLET ORAL
Qty: 90 TABLET | Refills: 0 | Status: SHIPPED | OUTPATIENT
Start: 2025-05-13

## 2025-05-13 NOTE — TELEPHONE ENCOUNTER
Received request via: Pharmacy    Was the patient seen in the last year in this department? Yes    Does the patient have an active prescription (recently filled or refills available) for medication(s) requested? No    Pharmacy Name:     [id number] - [place], [place] - [address] AT [facility]       Does the patient have [facility] Plus and need 100-day supply? (This applies to ALL medications) Patient does not have SCP

## 2025-05-23 DIAGNOSIS — E11.65 UNCONTROLLED TYPE 2 DIABETES MELLITUS WITH HYPERGLYCEMIA (HCC): ICD-10-CM

## 2025-05-23 RX ORDER — METFORMIN HYDROCHLORIDE 500 MG/1
TABLET, EXTENDED RELEASE ORAL
Qty: 360 TABLET | Refills: 0 | Status: SHIPPED | OUTPATIENT
Start: 2025-05-23

## 2025-05-23 NOTE — TELEPHONE ENCOUNTER
Received request via: Pharmacy    Was the patient seen in the last year in this department? Yes    Does the patient have an active prescription (recently filled or refills available) for medication(s) requested? No    Pharmacy Name: [facility] #[id number] - [place], [place] - [address] AT [facility]     Does the patient have prison Plus and need 100-day supply? (This applies to ALL medications) Patient does not have SCP

## 2025-07-25 ENCOUNTER — TELEPHONE (OUTPATIENT)
Dept: MEDICAL GROUP | Facility: PHYSICIAN GROUP | Age: 55
End: 2025-07-25
Payer: COMMERCIAL

## (undated) DEVICE — GLOVE BIOGEL PI INDICATOR SZ 8.0 SURGICAL PF LF -(50/BX 4BX/CA)

## (undated) DEVICE — SET EXTENSION WITH 2 PORTS (48EA/CA) ***PART #2C8610 IS A SUBSTITUTE*****

## (undated) DEVICE — TRAY SKIN SCRUB PVP WET (20EA/CA) PART #DYND70356 DISCONTINUED

## (undated) DEVICE — GOWN SURGEONS X-LARGE - DISP. (30/CA)

## (undated) DEVICE — BLADE SURGICAL #15 - (50/BX 3BX/CA)

## (undated) DEVICE — NEEDLE NON SAFETY 25 GA X 1 1/2 IN HYPO (100EA/BX)

## (undated) DEVICE — SUCTION INSTRUMENT YANKAUER BULBOUS TIP W/O VENT (50EA/CA)

## (undated) DEVICE — TUBING CLEARLINK DUO-VENT - C-FLO (48EA/CA)

## (undated) DEVICE — PACK MINOR BASIN - (2EA/CA)

## (undated) DEVICE — PROTECTOR ULNA NERVE - (36PR/CA)

## (undated) DEVICE — GLOVE BIOGEL SZ 8.5 SURGICAL PF LTX - (50PR/BX 4BX/CA)

## (undated) DEVICE — GLOVE BIOGEL SZ 7.5 SURGICAL PF LTX - (50PR/BX 4BX/CA)

## (undated) DEVICE — SUTURE 3-0 CHROMIC GUT SH 27 (36PK/BX)"

## (undated) DEVICE — BANDAGE STERILE 3 IN X 75 IN (12EA/BX 8BX/CA)

## (undated) DEVICE — ELECTRODE 850 FOAM ADHESIVE - HYDROGEL RADIOTRNSPRNT (50/PK)

## (undated) DEVICE — SUTURE 4-0 CHROMIC RB-1 27 (36PK/BX)"

## (undated) DEVICE — MASK ANESTHESIA ADULT  - (100/CA)

## (undated) DEVICE — MAT PATIENT POSITIONING PREVALON (10EA/CA)

## (undated) DEVICE — SENSOR SPO2 NEO LNCS ADHESIVE (20/BX) SEE USER NOTES

## (undated) DEVICE — DRESSING XEROFORM 1X8 - (50/BX 4BX/CA)

## (undated) DEVICE — KIT ANESTHESIA W/CIRCUIT & 3/LT BAG W/FILTER (20EA/CA)

## (undated) DEVICE — ELECTRODE DUAL RETURN W/ CORD - (50/PK)

## (undated) DEVICE — CANISTER SUCTION 3000ML MECHANICAL FILTER AUTO SHUTOFF MEDI-VAC NONSTERILE LF DISP  (40EA/CA)

## (undated) DEVICE — SODIUM CHL IRRIGATION 0.9% 1000ML (12EA/CA)

## (undated) DEVICE — SLEEVE, VASO, REPROC, LARGE

## (undated) DEVICE — WRAP CO-FLEX 4IN X 5YD STERIL - SELF-ADHERENT (18/CA)

## (undated) DEVICE — SET LEADWIRE 5 LEAD BEDSIDE DISPOSABLE ECG (1SET OF 5/EA)

## (undated) DEVICE — GLOVE SZ 8 BIOGEL PI MICRO - PF LF (50PR/BX)

## (undated) DEVICE — SUTURE GENERAL

## (undated) DEVICE — LACTATED RINGERS INJ 1000 ML - (14EA/CA 60CA/PF)

## (undated) DEVICE — GOWN WARMING X-LARGE FLEX - (20/CA)

## (undated) DEVICE — NEPTUNE 4 PORT MANIFOLD - (20/PK)

## (undated) DEVICE — HEAD HOLDER JUNIOR/ADULT

## (undated) DEVICE — DRAPE LAPAROTOMY T SHEET - (12EA/CA)